# Patient Record
Sex: MALE | Race: WHITE | Employment: UNEMPLOYED | URBAN - METROPOLITAN AREA
[De-identification: names, ages, dates, MRNs, and addresses within clinical notes are randomized per-mention and may not be internally consistent; named-entity substitution may affect disease eponyms.]

---

## 2017-02-03 RX ORDER — PRAVASTATIN SODIUM 40 MG/1
40 TABLET ORAL
Qty: 90 TAB | Refills: 3 | Status: SHIPPED | OUTPATIENT
Start: 2017-02-03 | End: 2018-02-07 | Stop reason: ALTCHOICE

## 2017-02-10 ENCOUNTER — PATIENT OUTREACH (OUTPATIENT)
Dept: FAMILY MEDICINE CLINIC | Age: 82
End: 2017-02-10

## 2017-02-10 ENCOUNTER — OFFICE VISIT (OUTPATIENT)
Dept: FAMILY MEDICINE CLINIC | Age: 82
End: 2017-02-10

## 2017-02-10 VITALS
SYSTOLIC BLOOD PRESSURE: 152 MMHG | WEIGHT: 171 LBS | RESPIRATION RATE: 16 BRPM | TEMPERATURE: 96.2 F | HEART RATE: 61 BPM | DIASTOLIC BLOOD PRESSURE: 46 MMHG | HEIGHT: 66 IN | BODY MASS INDEX: 27.48 KG/M2

## 2017-02-10 DIAGNOSIS — Z78.9 ADVANCE DIRECTIVE DECLINED BY PATIENT: ICD-10-CM

## 2017-02-10 DIAGNOSIS — Z23 NEED FOR TDAP VACCINATION: Primary | ICD-10-CM

## 2017-02-10 DIAGNOSIS — Z13.39 SCREENING FOR ALCOHOLISM: ICD-10-CM

## 2017-02-10 DIAGNOSIS — Z00.00 ROUTINE GENERAL MEDICAL EXAMINATION AT A HEALTH CARE FACILITY: ICD-10-CM

## 2017-02-10 NOTE — PROGRESS NOTES
1. Have you been to the ER, urgent care clinic since your last visit? Hospitalized since your last visit? No.     2. Have you seen or consulted any other health care providers outside of the [address] [place] [address] since your last visit? Include any pap smears or colon screening.  No.    Patient presents with Medicare Wellness Exam.

## 2017-02-10 NOTE — ACP (ADVANCE CARE PLANNING)
Non-Provider Advance Care Planning (ACP) Note    Date of ACP Conversation: 2/10/2017  Persons included in Conversation: patient  Length of ACP Conversation in minutes: <16 minutes (Non-Billable)    Conversation requested by:   Provider      Authorized Decision Maker (if patient is incapable of making informed decisions): This person is:  NA    General ACP for ALL Patients with Decision Making Capacity:    Advance Directive Conversation with Patients who have not yet planned:  Importance of advance care planning, including choosing a healthcare agent to communicate patient's healthcare decisions if patient lost the ability to make decisions, such as after a sudden illness or accident    Review of Existing Advance Directive: (Select questions covered)  NA    Interventions Provided:  Discussed importance of ACP, patient will think about it.

## 2017-02-10 NOTE — PROGRESS NOTES
This is a Subsequent Medicare Annual Wellness Visit providing Personalized Prevention Plan Services (PPPS) (Performed 12 months after initial AWV and PPPS )    I have reviewed the patient's medical history in detail and updated the computerized patient record. History     Past Medical History   Diagnosis Date    AAA (abdominal aortic aneurysm) ([facility] [address]      infrarenal    B12 deficiency 7/2009     261    CAD (coronary artery disease)      Denis    Diverticulosis of colon 2009     sigmoid    Fatty liver 2009    Hearing loss      aides    HTN (hypertension)     Hyperlipidemia     Hypogonadism male     Inguinal hernia      right    Osteoporosis 2006     comp fx T6,7, L1, 2009 fx T8,9, right 10th rib    Renal cyst, right 2009      Past Surgical History   Procedure Laterality Date    Hx hernia repair       left    Hx coronary artery bypass graft       Current Outpatient Prescriptions   Medication Sig Dispense Refill    atorvastatin (LIPITOR) 40 mg tablet Take 1 Tab by mouth daily. 30 Tab 11    NIFEdipine ER (ADALAT CC) 60 mg ER tablet Take 1 Tab by mouth daily. 90 Tab 3    labetalol (NORMODYNE) 100 mg tablet Take 1/2 tablet twice per day. 180 Tab 3    albuterol (PROVENTIL HFA, VENTOLIN HFA, PROAIR HFA) 90 mcg/actuation inhaler Take 1 Puff by inhalation every four (4) hours as needed for Wheezing. 1 Inhaler 1    aspirin delayed-release 81 mg tablet Take 1 Tab by mouth daily. 90 Tab 3    cyanocobalamin (VITAMIN B-12) 1,000 mcg tablet Take 1 Tab by mouth daily. 90 Tab 3    calcium-cholecalciferol, D3, (CALCARB 600 WITH VITAMIN D) tablet Take 1 Tab by mouth two (2) times a day. 180 Tab 3    pravastatin (PRAVACHOL) 40 mg tablet Take 1 Tab by mouth nightly.  90 Tab 3     Allergies   Allergen Reactions    Ace Inhibitors Cough    Codeine Other (comments)     headache    Fosamax [Alendronate] Other (comments)     peridontal pain    Procardia [Nifedipine] Swelling     Family History   Problem Relation Age of Onset    Cancer Mother     Stroke Mother     Heart Disease Father      Social History   Substance Use Topics    Smoking status: Former Smoker     Years: 8.00     Quit date: 11/7/1953    Smokeless tobacco: Never Used    Alcohol use No     Patient Active Problem List   Diagnosis Code    CAD (coronary artery disease) I25.10    Hyperlipidemia E78.5    B12 deficiency E53.8    Renal cyst, right N28.1    Fatty liver K76.0    Inguinal hernia K40.90    Diverticulosis of colon K57.30    Hypogonadism male E29.1    Osteoporosis M81.0    Colon cancer screening - patient declines Z12.11    Aortic insufficiency I35.1    Basal cell carcinoma C44.91    Pulmonary nodule, right R91.1    Essential hypertension I10    Advanced care planning/counseling discussion Z71.89    Abdominal aortic aneurysm without rupture ([facility] [address] I71.4    Emphysema of lung ([facility] [address] J43.9    Pulmonary emphysema ([facility] [address] J43.9       Depression Risk Factor Screening:     PHQ 2 / 9, over the last two weeks 2/10/2017   Little interest or pleasure in doing things Not at all   Feeling down, depressed or hopeless Not at all   Total Score PHQ 2 0     Alcohol Risk Factor Screening: On any occasion during the past 3 months, have you had more than 4 drinks containing alcohol? No    Do you average more than 14 drinks per week? No      Functional Ability and Level of Safety:     Hearing Loss   Mild to both ears, seen by ENT- holding off hearing aids. Activities of Daily Living   Self-care. Requires assistance with: no ADLs    Fall Risk     Fall Risk Assessment, last 12 mths 2/10/2017   Able to walk? Yes   Fall in past 12 months? No     Abuse Screen   Patient is not abused. Lives alone.     Review of Systems   Not required    Physical Examination     Evaluation of Cognitive Function:  Mood/affect:  happy  Appearance: age appropriate  Family member/caregiver input: None      Patient Care Team:  Chico Torres MD as PCP - General (Internal Medicine)  Olivia Turner MD (Dermatology)  Marcine Duane, MD (Otolaryngology)  Marilyn Holt MD (Cardiology)    Advice/Referrals/Counseling   Education and counseling provided:  End-of-Life planning (with patient's consent)  Need for Tdap    Assessment/Plan   reviewed diet, exercise and weight control. Walks short distance, very active at home. Referred to local pharmacy for Tdap vaccine. Not ready to complete ACP, willing to think about it.

## 2017-02-10 NOTE — PROGRESS NOTES
Repeat manual /46, medications taking this am. Patient will FU with Dr. Juan Ramon Centeno in 1 month.

## 2017-02-10 NOTE — PATIENT INSTRUCTIONS
Medicare Wellness Visit, Male    The best way to live healthy is to have a healthy lifestyle by eating a well-balanced diet, exercising regularly, limiting alcohol and stopping smoking. Regular physical exams and screening tests are another way to keep healthy. Preventive exams provided by your health care provider can find health problems before they become diseases or illnesses. Preventive services including immunizations, screening tests, monitoring and exams can help you take care of your own health. All people over age 72 should have a pneumovax  and and a prevnar shot to prevent pneumonia. These are once in a lifetime unless you and your provider decide differently. All people over 65 should have a yearly flu shot and a tetanus vaccine every 10 years. Screening for diabetes mellitus with a blood sugar test should be done every year. Glaucoma is a disease of the eye due to increased ocular pressure that can lead to blindness and it should be done every year by an eye professional.    Cardiovascular screening tests that check for elevated lipids (fatty part of blood) which can lead to heart disease and strokes should be done every 5 years. Colorectal screening that evaluates for blood or polyps in your colon should be done yearly as a stool test or every five years as a flexible sigmoidoscope or every 10 years as a colonoscopy up to age 76. Men up to age 76 may need a screening blood test for prostate cancer at certain intervals, depending on their personal and family history. This decision is between the patient and his provider. If you have been a smoker or had family history of abdominal aortic aneurysms, you and your provider may decide to schedule an ultrasound test of your aorta. Hepatitis C screening is also recommended for anyone born between [birth year] through [address]. A shingles vaccine is also recommended once in a lifetime after age 61.     Your Medicare Wellness Exam is recommended annually. Here is a list of your current Health Maintenance items with a due date:  Health Maintenance Due   Topic Date Due    DTaP/Tdap/Td  (1 - Tdap) 11/14/1951       Medicare Wellness Visit, Male    The best way to live healthy is to have a healthy lifestyle by eating a well-balanced diet, exercising regularly, limiting alcohol and stopping smoking. Regular physical exams and screening tests are another way to keep healthy. Preventive exams provided by your health care provider can find health problems before they become diseases or illnesses. Preventive services including immunizations, screening tests, monitoring and exams can help you take care of your own health. All people over age 72 should have a pneumovax  and and a prevnar shot to prevent pneumonia. These are once in a lifetime unless you and your provider decide differently. All people over 65 should have a yearly flu shot and a tetanus vaccine every 10 years. Screening for diabetes mellitus with a blood sugar test should be done every year. Glaucoma is a disease of the eye due to increased ocular pressure that can lead to blindness and it should be done every year by an eye professional.    Cardiovascular screening tests that check for elevated lipids (fatty part of blood) which can lead to heart disease and strokes should be done every 5 years. Colorectal screening that evaluates for blood or polyps in your colon should be done yearly as a stool test or every five years as a flexible sigmoidoscope or every 10 years as a colonoscopy up to age 76. Men up to age 76 may need a screening blood test for prostate cancer at certain intervals, depending on their personal and family history. This decision is between the patient and his provider. If you have been a smoker or had family history of abdominal aortic aneurysms, you and your provider may decide to schedule an ultrasound test of your aorta.     Hepatitis C screening is also recommended for anyone born between [birth year] [address]. A shingles vaccine is also recommended once in a lifetime after age 61. Your Medicare Wellness Exam is recommended annually. Here is a list of your current Health Maintenance items with a due date:  Health Maintenance Due   Topic Date Due    DTaP/Tdap/Td  (1 - Tdap) 1951          Tdap (Tetanus, Diphtheria, Pertussis) Vaccine: What You Need to Know  Why get vaccinated? Tetanus, diphtheria, and pertussis are very serious diseases. Tdap vaccine can protect us from these diseases. And Tdap vaccine given to pregnant women can protect  babies against pertussis. Tetanus (lockjaw) is rare in the [facility] today. It causes painful muscle tightening and stiffness, usually all over the body. · It can lead to tightening of muscles in the head and neck so you can't open your mouth, swallow, or sometimes even breathe. Tetanus kills about 1 out of 10 people who are infected even after receiving the best medical care. Diphtheria is also rare in the [place] today. It can cause a thick coating to form in the back of the throat. · It can lead to breathing problems, heart failure, paralysis, and death. Pertussis (whooping cough) causes severe coughing spells, which can cause difficulty breathing, vomiting, and disturbed sleep. · It can also lead to weight loss, incontinence, and rib fractures. Up to 2 in 100 adolescents and 5 in 100 adults with pertussis are hospitalized or have complications, which could include pneumonia or death. These diseases are caused by bacteria. Diphtheria and pertussis are spread from person to person through secretions from coughing or sneezing. Tetanus enters the body through cuts, scratches, or wounds. Before vaccines, as many as 200,000 cases of diphtheria, 200,000 cases of pertussis, and hundreds of cases of tetanus were reported in the [place] each year.  Since vaccination began, reports of cases for tetanus and diphtheria have dropped by about 99% and for pertussis by about 80%. Tdap vaccine  The Tdap vaccine can protect adolescents and adults from tetanus, diphtheria, and pertussis. One dose of Tdap is routinely given at age 6 or 15. People who did not get Tdap at that age should get it as soon as possible. Tdap is especially important for health care professionals and anyone having close contact with a baby younger than 12 months. Pregnant women should get a dose of Tdap during every pregnancy, to protect the  from pertussis. Infants are most at risk for severe, life-threatening complications from pertussis. Another vaccine, called Td, protects against tetanus and diphtheria, but not pertussis. A Td booster should be given every 10 years. Tdap may be given as one of these boosters if you have never gotten Tdap before. Tdap may also be given after a severe cut or burn to prevent tetanus infection. Your doctor or the person giving you the vaccine can give you more information. Tdap may safely be given at the same time as other vaccines. Some people should not get this vaccine  · A person who has ever had a life-threatening allergic reaction after a previous dose of any diphtheria-, tetanus-, or pertussis-containing vaccine, OR has a severe allergy to any part of this vaccine, should not get Tdap vaccine. Tell the person giving the vaccine about any severe allergies. · Anyone who had coma or long repeated seizures within 7 days after a childhood dose of DTP or DTaP, or a previous dose of Tdap, should not get Tdap, unless a cause other than the vaccine was found. They can still get Td. · Talk to your doctor if you:  ¨ Have seizures or another nervous system problem. ¨ Had severe pain or swelling after any vaccine containing diphtheria, tetanus, or pertussis. ¨ Ever had a condition called Guillain-Barré Syndrome (GBS).   ¨ Aren't feeling well on the day the shot is scheduled. Risks  With any medicine, including vaccines, there is a chance of side effects. These are usually mild and go away on their own. Serious reactions are also possible but are rare. Most people who get Tdap vaccine do not have any problems with it. Mild problems following Tdap  (Did not interfere with activities)  · Pain where the shot was given (about 3 in 4 adolescents or 2 in 3 adults)  · Redness or swelling where the shot was given (about 1 person in 5)  · Mild fever of at least 100.4°F (up to about 1 in 25 adolescents or 1 in 100 adults)  · Headache (about 3 or 4 people in 10)  · Tiredness (about 1 person in 3 or 4)  · Nausea, vomiting, diarrhea, stomachache (up to 1 in 4 adolescents or 1 in 10 adults)  · Chills, sore joints (about 1 person in 10)  · Body aches (about 1 person in 3 or 4)  · Rash, swollen glands (uncommon)  Moderate problems following Tdap  (Interfered with activities, but did not require medical attention)  · Pain where the shot was given (up to 1 in 5 or 6)  · Redness or swelling where the shot was given (up to about 1 in 16 adolescents or 1 in 12 adults)  · Fever over 102°F (about 1 in 100 adolescents or 1 in 250 adults)  · Headache (about 1 in 7 adolescents or 1 in 10 adults)  · Nausea, vomiting, diarrhea, stomachache (up to 1 to 3 people in 100)  · Swelling of the entire arm where the shot was given (up to about 1 in 500)  Severe problems following Tdap  (Unable to perform usual activities; required medical attention)  · Swelling, severe pain, bleeding and redness in the arm where the shot was given (rare)  Problems that could happen after any vaccine:  · People sometimes faint after a medical procedure, including vaccination. Sitting or lying down for about 15 minutes can help prevent fainting, and injuries caused by a fall. Tell your doctor if you feel dizzy or have vision changes or ringing in the ears.   · Some people get severe pain in the shoulder and have difficulty moving the arm where a shot was given. This happens very rarely. · Any medication can cause a severe allergic reaction. Such reactions from a vaccine are very rare, estimated at fewer than 1 in a million doses, and would happen within a few minutes to a few hours after the vaccination. As with any medicine, there is a very remote chance of a vaccine causing a serious injury or death. The safety of vaccines is always being monitored. For more information, visit: www.cdc.gov/vaccinesafety. What if there is a serious problem? What should I look for? · Look for anything that concerns you, such as signs of a severe allergic reaction, very high fever, or unusual behavior. Signs of a severe allergic reaction can include hives, swelling of the face and throat, difficulty breathing, a fast heartbeat, dizziness, and weakness. These would usually start a few minutes to a few hours after the vaccination. What should I do? · If you think it is a severe allergic reaction or other emergency that can't wait, call 9-1-1 or get the person to the nearest hospital. Otherwise, call your doctor. · Afterward, the reaction should be reported to the Vaccine Adverse Event Reporting System (VAERS). Your doctor might file this report, or you can do it yourself through the VAERS web site at www.vaers. hhs.gov, or by calling 7-795.858.3639. VAERS does not give medical advice. The National Vaccine Injury Compensation Program  The National Vaccine Injury Compensation Program (VICP) is a federal program that was created to compensate people who may have been injured by certain vaccines. Persons who believe they may have been injured by a vaccine can learn about the program and about filing a claim by calling 4-439.465.8328 or visiting the [employer] website at www.[facility].gov/vaccinecompensation. There is a time limit to file a claim for compensation. How can I learn more? · Ask your doctor.  He or she can give you the vaccine package insert or suggest other sources of information. · Call your local or state health department. · Contact the Centers for Disease Control and Prevention (CDC):  ¨ Call 8-446.151.1225 (1-800-CDC-INFO) or  ¨ Visit CDC's website at www.cdc.gov/vaccines  Vaccine Information Statement (Interim)  Tdap Vaccine  (2/24/15)  42 ANAND Sanders [id number]-[id number]  Department of Health and Human Services  Centers for Disease Control and Prevention  Many Vaccine Information Statements are available in Indian and other languages. See www.immunize.org/vis. Muchas hojas de información sobre vacunas están disponibles en español y en otros idiomas. Visite www.immunize.org/vis. Care instructions adapted under license by your healthcare professional. If you have questions about a medical condition or this instruction, always ask your healthcare professional. [address] any warranty or liability for your use of this information.

## 2017-02-10 NOTE — MR AVS SNAPSHOT
Visit Information Date & Time Provider Department Dept. Phone Encounter #  
 2/10/2017  9:45 AM Damaso Douglas, [facility] [phone] [id number] Follow-up Instructions Return in about 1 month (around 3/10/2017) for 30 MINUTE fu. Upcoming Health Maintenance Date Due DTaP/Tdap/Td series (1 - Tdap) 11/14/1951 GLAUCOMA SCREENING Q2Y 4/15/2017 MEDICARE YEARLY EXAM 2/11/2018 Allergies as of 2/10/2017  Review Complete On: 2/10/2017 By: Jama Armendariz Severity Noted Reaction Type Reactions Ace Inhibitors  11/29/2010    Cough Codeine  11/19/2010    Other (comments)  
 headache Fosamax [Alendronate]  11/19/2010    Other (comments) peridontal pain Procardia [Nifedipine]  11/19/2010    Swelling Current Immunizations  Reviewed on 9/9/2016 Name Date Influenza High Dose Vaccine PF 11/15/2016 Influenza Vaccine 11/5/2014, 10/8/2013 Influenza Vaccine (Quad) 9/8/2015 12:00 PM  
 Pneumococcal Conjugate (PCV-13) 11/15/2016 Pneumococcal Vaccine (Unspecified Type) 11/7/2011 Not reviewed this visit You Were Diagnosed With   
  
 Codes Comments Need for Tdap vaccination    -  Primary ICD-10-CM: O34 ICD-9-CM: V06.1 Routine general medical examination at a health care facility     ICD-10-CM: Z00.00 ICD-9-CM: V70.0 Screening for alcoholism     ICD-10-CM: Z13.89 ICD-9-CM: V79.1 Advance directive declined by patient     ICD-10-CM: Z78.9 ICD-9-CM: V49.89 Vitals BP Pulse Temp Resp Height(growth percentile) Weight(growth percentile) 152/46 (BP 1 Location: Left arm, BP Patient Position: Sitting) 61 96.2 °F (35.7 °C) (Oral) 16 5' 6\" (1.676 m) 171 lb (77.6 kg) BMI Smoking Status 27.6 kg/m2 Former Smoker Vitals History BMI and BSA Data Body Mass Index Body Surface Area  
 27.6 kg/m 2 1.9 m 2 Preferred Pharmacy Pharmacy Name Phone [facility], [address] [place] [address] [phone] Your Updated Medication List  
  
   
This list is accurate as of: 2/10/17 10:13 AM.  Always use your most recent med list.  
  
  
  
  
 albuterol 90 mcg/actuation inhaler Commonly known as:  PROVENTIL HFA, VENTOLIN HFA, PROAIR HFA Take 1 Puff by inhalation every four (4) hours as needed for Wheezing. aspirin delayed-release 81 mg tablet Take 1 Tab by mouth daily. atorvastatin 40 mg tablet Commonly known as:  LIPITOR Take 1 Tab by mouth daily. calcium-cholecalciferol (D3) tablet Commonly known as:  CALCARB 600 WITH VITAMIN D Take 1 Tab by mouth two (2) times a day. cyanocobalamin 1,000 mcg tablet Commonly known as:  VITAMIN B-12 Take 1 Tab by mouth daily. labetalol 100 mg tablet Commonly known as:  Seneca Iván Take 1/2 tablet twice per day. NIFEdipine ER 60 mg ER tablet Commonly known as:  ADALAT CC Take 1 Tab by mouth daily. pravastatin 40 mg tablet Commonly known as:  PRAVACHOL Take 1 Tab by mouth nightly. Follow-up Instructions Return in about 1 month (around 3/10/2017) for 30 MINUTE fu.  
  
  
Patient Instructions Medicare Wellness Visit, Male The best way to live healthy is to have a healthy lifestyle by eating a well-balanced diet, exercising regularly, limiting alcohol and stopping smoking. Regular physical exams and screening tests are another way to keep healthy. Preventive exams provided by your health care provider can find health problems before they become diseases or illnesses. Preventive services including immunizations, screening tests, monitoring and exams can help you take care of your own health. All people over age 72 should have a pneumovax  and and a prevnar shot to prevent pneumonia. These are once in a lifetime unless you and your provider decide differently. All people over 65 should have a yearly flu shot and a tetanus vaccine every 10 years. Screening for diabetes mellitus with a blood sugar test should be done every year. Glaucoma is a disease of the eye due to increased ocular pressure that can lead to blindness and it should be done every year by an eye professional. 
 
Cardiovascular screening tests that check for elevated lipids (fatty part of blood) which can lead to heart disease and strokes should be done every 5 years. Colorectal screening that evaluates for blood or polyps in your colon should be done yearly as a stool test or every five years as a flexible sigmoidoscope or every 10 years as a colonoscopy up to age 76. Men up to age 76 may need a screening blood test for prostate cancer at certain intervals, depending on their personal and family history. This decision is between the patient and his provider. If you have been a smoker or had family history of abdominal aortic aneurysms, you and your provider may decide to schedule an ultrasound test of your aorta. Hepatitis C screening is also recommended for anyone born between [birth year] through [address]. A shingles vaccine is also recommended once in a lifetime after age 61. Your Medicare Wellness Exam is recommended annually. Here is a list of your current Health Maintenance items with a due date: 
Health Maintenance Due Topic Date Due  
 DTaP/Tdap/Td  (1 - Tdap) 11/14/1951 Medicare Wellness Visit, Male The best way to live healthy is to have a healthy lifestyle by eating a well-balanced diet, exercising regularly, limiting alcohol and stopping smoking. Regular physical exams and screening tests are another way to keep healthy. Preventive exams provided by your health care provider can find health problems before they become diseases or illnesses.  Preventive services including immunizations, screening tests, monitoring and exams can help you take care of your own health. All people over age 72 should have a pneumovax  and and a prevnar shot to prevent pneumonia. These are once in a lifetime unless you and your provider decide differently. All people over 65 should have a yearly flu shot and a tetanus vaccine every 10 years. Screening for diabetes mellitus with a blood sugar test should be done every year. Glaucoma is a disease of the eye due to increased ocular pressure that can lead to blindness and it should be done every year by an eye professional. 
 
Cardiovascular screening tests that check for elevated lipids (fatty part of blood) which can lead to heart disease and strokes should be done every 5 years. Colorectal screening that evaluates for blood or polyps in your colon should be done yearly as a stool test or every five years as a flexible sigmoidoscope or every 10 years as a colonoscopy up to age 76. Men up to age 76 may need a screening blood test for prostate cancer at certain intervals, depending on their personal and family history. This decision is between the patient and his provider. If you have been a smoker or had family history of abdominal aortic aneurysms, you and your provider may decide to schedule an ultrasound test of your aorta. Hepatitis C screening is also recommended for anyone born between [birth year] [address]. A shingles vaccine is also recommended once in a lifetime after age 61. Your Medicare Wellness Exam is recommended annually. Here is a list of your current Health Maintenance items with a due date: 
Health Maintenance Due Topic Date Due  
 DTaP/Tdap/Td  (1 - Tdap) 1951 Tdap (Tetanus, Diphtheria, Pertussis) Vaccine: What You Need to Know Why get vaccinated? Tetanus, diphtheria, and pertussis are very serious diseases. Tdap vaccine can protect us from these diseases. And Tdap vaccine given to pregnant women can protect  babies against pertussis. Tetanus (lockjaw) is rare in the [facility] today. It causes painful muscle tightening and stiffness, usually all over the body. · It can lead to tightening of muscles in the head and neck so you can't open your mouth, swallow, or sometimes even breathe. Tetanus kills about 1 out of 10 people who are infected even after receiving the best medical care. Diphtheria is also rare in the [place] today. It can cause a thick coating to form in the back of the throat. · It can lead to breathing problems, heart failure, paralysis, and death. Pertussis (whooping cough) causes severe coughing spells, which can cause difficulty breathing, vomiting, and disturbed sleep. · It can also lead to weight loss, incontinence, and rib fractures. Up to 2 in 100 adolescents and 5 in 100 adults with pertussis are hospitalized or have complications, which could include pneumonia or death. These diseases are caused by bacteria. Diphtheria and pertussis are spread from person to person through secretions from coughing or sneezing. Tetanus enters the body through cuts, scratches, or wounds. Before vaccines, as many as 200,000 cases of diphtheria, 200,000 cases of pertussis, and hundreds of cases of tetanus were reported in the [place] each year. Since vaccination began, reports of cases for tetanus and diphtheria have dropped by about 99% and for pertussis by about 80%. Tdap vaccine The Tdap vaccine can protect adolescents and adults from tetanus, diphtheria, and pertussis. One dose of Tdap is routinely given at age 6 or 15. People who did not get Tdap at that age should get it as soon as possible. Tdap is especially important for health care professionals and anyone having close contact with a baby younger than 12 months. Pregnant women should get a dose of Tdap during every pregnancy, to protect the  from pertussis. Infants are most at risk for severe, life-threatening complications from pertussis. Another vaccine, called Td, protects against tetanus and diphtheria, but not pertussis. A Td booster should be given every 10 years. Tdap may be given as one of these boosters if you have never gotten Tdap before. Tdap may also be given after a severe cut or burn to prevent tetanus infection. Your doctor or the person giving you the vaccine can give you more information. Tdap may safely be given at the same time as other vaccines. Some people should not get this vaccine · A person who has ever had a life-threatening allergic reaction after a previous dose of any diphtheria-, tetanus-, or pertussis-containing vaccine, OR has a severe allergy to any part of this vaccine, should not get Tdap vaccine. Tell the person giving the vaccine about any severe allergies. · Anyone who had coma or long repeated seizures within 7 days after a childhood dose of DTP or DTaP, or a previous dose of Tdap, should not get Tdap, unless a cause other than the vaccine was found. They can still get Td. · Talk to your doctor if you: 
¨ Have seizures or another nervous system problem. ¨ Had severe pain or swelling after any vaccine containing diphtheria, tetanus, or pertussis. ¨ Ever had a condition called Guillain-Barré Syndrome (GBS). ¨ Aren't feeling well on the day the shot is scheduled. Risks With any medicine, including vaccines, there is a chance of side effects. These are usually mild and go away on their own. Serious reactions are also possible but are rare. Most people who get Tdap vaccine do not have any problems with it. Mild problems following Tdap 
(Did not interfere with activities) · Pain where the shot was given (about 3 in 4 adolescents or 2 in 3 adults) · Redness or swelling where the shot was given (about 1 person in 5) · Mild fever of at least 100.4°F (up to about 1 in 25 adolescents or 1 in 100 adults) · Headache (about 3 or 4 people in 10) · Tiredness (about 1 person in 3 or 4) · Nausea, vomiting, diarrhea, stomachache (up to 1 in 4 adolescents or 1 in 10 adults) · Chills, sore joints (about 1 person in 10) · Body aches (about 1 person in 3 or 4) · Rash, swollen glands (uncommon) Moderate problems following Tdap (Interfered with activities, but did not require medical attention) · Pain where the shot was given (up to 1 in 5 or 6) · Redness or swelling where the shot was given (up to about 1 in 16 adolescents or 1 in 12 adults) · Fever over 102°F (about 1 in 100 adolescents or 1 in 250 adults) · Headache (about 1 in 7 adolescents or 1 in 10 adults) · Nausea, vomiting, diarrhea, stomachache (up to 1 to 3 people in 100) · Swelling of the entire arm where the shot was given (up to about 1 in 500) Severe problems following Tdap 
(Unable to perform usual activities; required medical attention) · Swelling, severe pain, bleeding and redness in the arm where the shot was given (rare) Problems that could happen after any vaccine: · People sometimes faint after a medical procedure, including vaccination. Sitting or lying down for about 15 minutes can help prevent fainting, and injuries caused by a fall. Tell your doctor if you feel dizzy or have vision changes or ringing in the ears. · Some people get severe pain in the shoulder and have difficulty moving the arm where a shot was given. This happens very rarely. · Any medication can cause a severe allergic reaction. Such reactions from a vaccine are very rare, estimated at fewer than 1 in a million doses, and would happen within a few minutes to a few hours after the vaccination. As with any medicine, there is a very remote chance of a vaccine causing a serious injury or death. The safety of vaccines is always being monitored. For more information, visit: www.cdc.gov/vaccinesafety. What if there is a serious problem? What should I look for?  
· Look for anything that concerns you, such as signs of a severe allergic reaction, very high fever, or unusual behavior. Signs of a severe allergic reaction can include hives, swelling of the face and throat, difficulty breathing, a fast heartbeat, dizziness, and weakness. These would usually start a few minutes to a few hours after the vaccination. What should I do? · If you think it is a severe allergic reaction or other emergency that can't wait, call 9-1-1 or get the person to the nearest hospital. Otherwise, call your doctor. · Afterward, the reaction should be reported to the Vaccine Adverse Event Reporting System (VAERS). Your doctor might file this report, or you can do it yourself through the VAERS web site at www.vaers. [facility].gov, or by calling 7-309.791.1550. VAERS does not give medical advice. The National Vaccine Injury Compensation Program 
The National Vaccine Injury Compensation Program (VICP) is a federal program that was created to compensate people who may have been injured by certain vaccines. Persons who believe they may have been injured by a vaccine can learn about the program and about filing a claim by calling 1-246.713.8112 or visiting the [employer] website at www.[facility].gov/vaccinecompensation. There is a time limit to file a claim for compensation. How can I learn more? · Ask your doctor. He or she can give you the vaccine package insert or suggest other sources of information. · Call your local or state health department. · Contact the Centers for Disease Control and Prevention (CDC): 
¨ Call 9-702.910.1529 (1-800-CDC-INFO) or ¨ Visit CDC's website at www.cdc.gov/vaccines Vaccine Information Statement (Interim) Tdap Vaccine 
(2/24/15) [address] Karen Oppenheim 959OG-32 Department of [facility] and [employer] Centers for Disease Control and Prevention Many Vaccine Information Statements are available in [place] and other languages. See www.immunize.org/vis.  
Muchas hojas de información sobre vacunas están disponibles en español y en otros idiomas. Visite www.immunize.org/vis. Care instructions adapted under license by your healthcare professional. If you have questions about a medical condition or this instruction, always ask your healthcare professional. [address] any warranty or liability for your use of this information. Introducing [facility]! Kodak Douglas introduces [employer] patient portal. Now you can access parts of your medical record, email your doctor's office, and request medication refills online. 1. In your internet browser, go to [email or web address] 2. Click on the First Time User? Click Here link in the Sign In box. You will see the New Member Sign Up page. 3. Enter your [employer] Access Code exactly as it appears below. You will not need to use this code after youve completed the sign-up process. If you do not sign up before the expiration date, you must request a new code. · [employer] Access Code: [email or web address] Expires: 3/12/2017  2:17 PM 
 
4. Enter the last four digits of your Social Security Number (xxxx) and Date of Birth (mm/dd/yyyy) as indicated and click Submit. You will be taken to the next sign-up page. 5. Create a [employer] ID. This will be your [employer] login ID and cannot be changed, so think of one that is secure and easy to remember. 6. Create a [employer] password. You can change your password at any time. 7. Enter your Password Reset Question and Answer. This can be used at a later time if you forget your password. 8. Enter your e-mail address. You will receive e-mail notification when new information is available in [address] 9. Click Sign Up. You can now view and download portions of your medical record. 10. Click the Download Summary menu link to download a portable copy of your medical information.  
 
If you have questions, please visit the Frequently Asked Questions section of the [employer]. Remember, [employer] is NOT to be used for urgent needs. For medical emergencies, dial 911. Now available from your iPhone and Android! Please provide this summary of care documentation to your next provider. Your primary care clinician is listed as PAUL Medina. If you have any questions after today's visit, please call [phone].

## 2017-03-16 ENCOUNTER — OFFICE VISIT (OUTPATIENT)
Dept: FAMILY MEDICINE CLINIC | Age: 82
End: 2017-03-16

## 2017-03-16 VITALS
SYSTOLIC BLOOD PRESSURE: 140 MMHG | BODY MASS INDEX: 27.64 KG/M2 | TEMPERATURE: 95.8 F | RESPIRATION RATE: 16 BRPM | WEIGHT: 172 LBS | DIASTOLIC BLOOD PRESSURE: 48 MMHG | HEART RATE: 64 BPM | HEIGHT: 66 IN

## 2017-03-16 DIAGNOSIS — F32.A DEPRESSION, UNSPECIFIED DEPRESSION TYPE: Primary | ICD-10-CM

## 2017-03-16 DIAGNOSIS — I71.40 ABDOMINAL AORTIC ANEURYSM WITHOUT RUPTURE: ICD-10-CM

## 2017-03-16 RX ORDER — VENLAFAXINE HYDROCHLORIDE 37.5 MG/1
37.5 CAPSULE, EXTENDED RELEASE ORAL DAILY
Qty: 30 CAP | Refills: 5 | Status: SHIPPED | OUTPATIENT
Start: 2017-03-16 | End: 2017-06-14 | Stop reason: SINTOL

## 2017-03-16 RX ORDER — VENLAFAXINE HYDROCHLORIDE 37.5 MG/1
37.5 CAPSULE, EXTENDED RELEASE ORAL DAILY
Qty: 30 CAP | Refills: 5 | Status: SHIPPED | OUTPATIENT
Start: 2017-03-16 | End: 2017-03-16 | Stop reason: SDUPTHER

## 2017-03-16 NOTE — PROGRESS NOTES
1. Have you been to the ER, urgent care clinic since your last visit? Hospitalized since your last visit? No.     2. Have you seen or consulted any other health care providers outside of the [address] [place] [address] since your last visit? Include any pap smears or colon screening. No.    Patient presents with follow up CAD, Hypertension, hyperlipidemia and osteoporosis.

## 2017-03-16 NOTE — PATIENT INSTRUCTIONS
Please make an appointment with the eye doctor next month for a glaucoma screening. Recovering From Depression: Care Instructions  Your Care Instructions  Taking good care of yourself is important as you recover from depression. In time, your symptoms will fade as your treatment takes hold. Do not give up. Instead, focus your energy on getting better. Your mood will improve. It just takes some time. Focus on things that can help you feel better, such as being with friends and family, eating well, and getting enough rest. But take things slowly. Do not do too much too soon. You will begin to feel better gradually. Follow-up care is a key part of your treatment and safety. Be sure to make and go to all appointments, and call your doctor if you are having problems. It's also a good idea to know your test results and keep a list of the medicines you take. How can you care for yourself at home? Be realistic  · If you have a large task to do, break it up into smaller steps you can handle, and just do what you can. · You may want to put off important decisions until your depression has lifted. If you have plans that will have a major impact on your life, such as marriage, divorce, or a job change, try to wait a bit. Talk it over with friends and loved ones who can help you look at the overall picture first.  · Reaching out to people for help is important. Do not isolate yourself. Let your family and friends help you. Find someone you can trust and confide in, and talk to that person. · Be patient, and be kind to yourself. Remember that depression is not your fault and is not something you can overcome with willpower alone. Treatment is necessary for depression, just like for any other illness. Feeling better takes time, and your mood will improve little by little. Stay active  · Stay busy and get outside. Take a walk, or try some other light exercise.   · Talk with your doctor about an exercise program. Exercise can help with mild depression. · Go to a movie or concert. Take part in a Jewish activity or other social gathering. Go to a ball game. · Ask a friend to have dinner with you. Take care of yourself  · Eat a balanced diet with plenty of fresh fruits and vegetables, whole grains, and lean protein. If you have lost your appetite, eat small snacks rather than large meals. · Avoid drinking alcohol or using illegal drugs. Do not take medicines that have not been prescribed for you. They may interfere with medicines you may be taking for depression, or they may make your depression worse. · Take your medicines exactly as they are prescribed. You may start to feel better within 1 to 3 weeks of taking antidepressant medicine. But it can take as many as 6 to 8 weeks to see more improvement. If you have questions or concerns about your medicines, or if you do not notice any improvement by 3 weeks, talk to your doctor. · If you have any side effects from your medicine, tell your doctor. Antidepressants can make you feel tired, dizzy, or nervous. Some people have dry mouth, constipation, headaches, sexual problems, or diarrhea. Many of these side effects are mild and will go away on their own after you have been taking the medicine for a few weeks. Some may last longer. Talk to your doctor if side effects are bothering you too much. You might be able to try a different medicine. · Get enough sleep. If you have problems sleeping:  ¨ Go to bed at the same time every night, and get up at the same time every morning. ¨ Keep your bedroom dark and quiet. ¨ Do not exercise after 5:00 p.m. ¨ Avoid drinks with caffeine after 5:00 p.m. · Avoid sleeping pills unless they are prescribed by the doctor treating your depression. Sleeping pills may make you groggy during the day, and they may interact with other medicine you are taking.   · If you have any other illnesses, such as diabetes, heart disease, or high blood pressure, make sure to continue with your treatment. Tell your doctor about all of the medicines you take, including those with or without a prescription. · Keep the numbers for these national suicide hotlines: 2-875-526-TALK (0-617-577-059-087-9127) and 6-941-UVCVPVJ (2-418.600.7683). If you or someone you know talks about suicide or feeling hopeless, get help right away. When should you call for help? Call 911 anytime you think you may need emergency care. For example, call if:  · You feel like hurting yourself or someone else. · Someone you know has depression and is about to attempt or is attempting suicide. Call your doctor now or seek immediate medical care if:  · You hear voices. · Someone you know has depression and:  ¨ Starts to give away his or her possessions. ¨ Uses illegal drugs or drinks alcohol heavily. ¨ Talks or writes about death, including writing suicide notes or talking about guns, knives, or pills. ¨ Starts to spend a lot of time alone. ¨ Acts very aggressively or suddenly appears calm. Watch closely for changes in your health, and be sure to contact your doctor if:  · You do not get better as expected. Where can you learn more? Go to http://gwendolyn-gay.info/. Enter A229 in the search box to learn more about \"Recovering From Depression: Care Instructions. \"  Current as of: July 26, 2016  Content Version: 11.1  © 4190-2796 [employer]. Care instructions adapted under license by [employer] (which disclaims liability or warranty for this information). If you have questions about a medical condition or this instruction, always ask your healthcare professional. [place] [address] any warranty or liability for your use of this information.

## 2017-03-16 NOTE — PROGRESS NOTES
Sandra Kennedy is a 80 y.o. male and presents with Follow Up Chronic Condition; Coronary Artery Disease; Hypertension; and Osteoporosis       Subjective:    CAD- seeing cardiology-   Aortic aneurysm- repeat CTA done 1/2017 and this remains at 4.5 cm. Pt feels depressed- son only comes up once in a while. No hobbies. Feels hopeless at times. Sometimes feels he would be better off dead. No SI/HI or active plans. htn- taking meds. Assessment/Plan:    CAD/ischemic cardiomyopathy- keep cardiology f/u. Stable. Aortic aneurysm- stable. No changes. htn-sl hypotensive diastolic today but given systolic borderline elevation will hold on changes. Depression- discussed medication and counseling. -pt reluctant but agrees to try these for now. Pt contracts for safety and will call for any SI.     RTC in one month  Orders Placed This Encounter    REFERRAL TO PSYCHOLOGY     Referral Priority:   Routine     Referral Type:   Behavioral Health     Referral Reason:   Specialty Services Required    DISCONTD: venlafaxine-SR (EFFEXOR-XR) 37.5 mg capsule     Sig: Take 1 Cap by mouth daily. Dispense:  30 Cap     Refill:  5    venlafaxine-SR (EFFEXOR-XR) 37.5 mg capsule     Sig: Take 1 Cap by mouth daily. Dispense:  30 Cap     Refill:  5           ROS:  Negative except as mentioned above  Cardiac- no chest pain or palpitations  Pulmonary- no sob or wheezes  GI- no n/v or diarrhea. SH:  Social History   Substance Use Topics    Smoking status: Former Smoker     Years: 8.00     Quit date: 11/7/1953    Smokeless tobacco: Never Used    Alcohol use No         Medications/Allergies:  Current Outpatient Prescriptions on File Prior to Visit   Medication Sig Dispense Refill    pravastatin (PRAVACHOL) 40 mg tablet Take 1 Tab by mouth nightly. 90 Tab 3    atorvastatin (LIPITOR) 40 mg tablet Take 1 Tab by mouth daily. 30 Tab 11    NIFEdipine ER (ADALAT CC) 60 mg ER tablet Take 1 Tab by mouth daily.  90 Tab 3    labetalol (NORMODYNE) 100 mg tablet Take 1/2 tablet twice per day. 180 Tab 3    albuterol (PROVENTIL HFA, VENTOLIN HFA, PROAIR HFA) 90 mcg/actuation inhaler Take 1 Puff by inhalation every four (4) hours as needed for Wheezing. 1 Inhaler 1    aspirin delayed-release 81 mg tablet Take 1 Tab by mouth daily. 90 Tab 3    cyanocobalamin (VITAMIN B-12) 1,000 mcg tablet Take 1 Tab by mouth daily. 90 Tab 3    calcium-cholecalciferol, D3, (CALCARB 600 WITH VITAMIN D) tablet Take 1 Tab by mouth two (2) times a day. 180 Tab 3     No current facility-administered medications on file prior to visit. Allergies   Allergen Reactions    Ace Inhibitors Cough    Codeine Other (comments)     headache    Fosamax [Alendronate] Other (comments)     peridontal pain    Procardia [Nifedipine] Swelling       Objective:  Visit Vitals    /48    Pulse 64    Temp 95.8 °F (35.4 °C) (Oral)    Resp 16    Ht 5' 6\" (1.676 m)    Wt 172 lb (78 kg)    BMI 27.76 kg/m2    Body mass index is 27.76 kg/(m^2). Constitutional: Well developed, nourished, no distress, alert   CV: S1, S2.  RRR. No murmurs/rubs. No thrills palpated. No carotid bruits. Intact distal pulses. 1+ edema. Pulm: No abnormalities on inspection. Clear to auscultation bilaterally. No wheezing/rhonchi. Normal effort. GI: Soft, nontender, nondistended. Normal active bowel sounds. No  masses on palpation. No hepatosplenomegaly.

## 2017-03-16 NOTE — MR AVS SNAPSHOT
Visit Information Date & Time Provider Department Dept. Phone Encounter #  
 3/16/2017 10:15 AM Kasey Spivey, [address] [place] [address] [phone] [id number] Follow-up Instructions Return in about 4 weeks (around 4/13/2017) for 30 minute slot. Upcoming Health Maintenance Date Due  
 GLAUCOMA SCREENING Q2Y 4/15/2017 DTaP/Tdap/Td series (1 - Tdap) 6/7/2017* MEDICARE YEARLY EXAM 2/11/2018 *Topic was postponed. The date shown is not the original due date. Allergies as of 3/16/2017  Review Complete On: 3/16/2017 By: Kasey Spivey MD  
  
 Severity Noted Reaction Type Reactions Ace Inhibitors  11/29/2010    Cough Codeine  11/19/2010    Other (comments)  
 headache Fosamax [Alendronate]  11/19/2010    Other (comments) peridontal pain Procardia [Nifedipine]  11/19/2010    Swelling Current Immunizations  Reviewed on 9/9/2016 Name Date Influenza High Dose Vaccine PF 11/15/2016 Influenza Vaccine 11/5/2014, 10/8/2013 Influenza Vaccine (Quad) 9/8/2015 12:00 PM  
 Pneumococcal Conjugate (PCV-13) 11/15/2016 Pneumococcal Vaccine (Unspecified Type) 11/7/2011 Not reviewed this visit You Were Diagnosed With   
  
 Codes Comments Depression, unspecified depression type    -  Primary ICD-10-CM: F32.9 ICD-9-CM: 826 Abdominal aortic aneurysm without rupture (HCC)     ICD-10-CM: I71.4 ICD-9-CM: 141. 4 Vitals BP Pulse Temp Resp Height(growth percentile) Weight(growth percentile) 140/48 64 95.8 °F (35.4 °C) (Oral) 16 5' 6\" (1.676 m) 172 lb (78 kg) BMI Smoking Status 27.76 kg/m2 Former Smoker Vitals History BMI and BSA Data Body Mass Index Body Surface Area  
 27.76 kg/m 2 1.91 m 2 Preferred Pharmacy Pharmacy Name Phone [facility] [phone], [address] [place] [address] [phone] Your Updated Medication List  
  
   
 This list is accurate as of: 3/16/17 10:48 AM.  Always use your most recent med list.  
  
  
  
  
 albuterol 90 mcg/actuation inhaler Commonly known as:  PROVENTIL HFA, VENTOLIN HFA, PROAIR HFA Take 1 Puff by inhalation every four (4) hours as needed for Wheezing. aspirin delayed-release 81 mg tablet Take 1 Tab by mouth daily. atorvastatin 40 mg tablet Commonly known as:  LIPITOR Take 1 Tab by mouth daily. calcium-cholecalciferol (D3) tablet Commonly known as:  CALCARB 600 WITH VITAMIN D Take 1 Tab by mouth two (2) times a day. cyanocobalamin 1,000 mcg tablet Commonly known as:  VITAMIN B-12 Take 1 Tab by mouth daily. labetalol 100 mg tablet Commonly known as:  Bruce Barges Take 1/2 tablet twice per day. NIFEdipine ER 60 mg ER tablet Commonly known as:  ADALAT CC Take 1 Tab by mouth daily. pravastatin 40 mg tablet Commonly known as:  PRAVACHOL Take 1 Tab by mouth nightly. venlafaxine-SR 37.5 mg capsule Commonly known as:  EFFEXOR-XR Take 1 Cap by mouth daily. Prescriptions Sent to Pharmacy Refills  
 venlafaxine-SR (EFFEXOR-XR) 37.5 mg capsule 5 Sig: Take 1 Cap by mouth daily. Class: Normal  
 Pharmacy: [facility] [address], [address] [place] [address] #: [phone] Route: Oral  
  
We Performed the Following REFERRAL TO PSYCHOLOGY [GOI06 Custom] Comments:  
 Please evaluate patient for depression. Follow-up Instructions Return in about 4 weeks (around 4/13/2017) for 30 minute slot. Referral Information Referral ID Referred By Referred To  
  
 [id number] MELIDA Faria Not Available Visits Status Start Date End Date 1 New Request 3/16/17 3/16/18 If your referral has a status of pending review or denied, additional information will be sent to support the outcome of this decision. Patient Instructions Please make an appointment with the eye doctor next month for a glaucoma screening. Recovering From Depression: Care Instructions Your Care Instructions Taking good care of yourself is important as you recover from depression. In time, your symptoms will fade as your treatment takes hold. Do not give up. Instead, focus your energy on getting better. Your mood will improve. It just takes some time. Focus on things that can help you feel better, such as being with friends and family, eating well, and getting enough rest. But take things slowly. Do not do too much too soon. You will begin to feel better gradually. Follow-up care is a key part of your treatment and safety. Be sure to make and go to all appointments, and call your doctor if you are having problems. It's also a good idea to know your test results and keep a list of the medicines you take. How can you care for yourself at home? Be realistic · If you have a large task to do, break it up into smaller steps you can handle, and just do what you can. · You may want to put off important decisions until your depression has lifted. If you have plans that will have a major impact on your life, such as marriage, divorce, or a job change, try to wait a bit. Talk it over with friends and loved ones who can help you look at the overall picture first. 
· Reaching out to people for help is important. Do not isolate yourself. Let your family and friends help you. Find someone you can trust and confide in, and talk to that person. · Be patient, and be kind to yourself. Remember that depression is not your fault and is not something you can overcome with willpower alone. Treatment is necessary for depression, just like for any other illness. Feeling better takes time, and your mood will improve little by little. Stay active · Stay busy and get outside. Take a walk, or try some other light exercise. · Talk with your doctor about an exercise program. Exercise can help with mild depression. · Go to a movie or concert. Take part in a Tenriism activity or other social gathering. Go to a ball game. · Ask a friend to have dinner with you. Take care of yourself · Eat a balanced diet with plenty of fresh fruits and vegetables, whole grains, and lean protein. If you have lost your appetite, eat small snacks rather than large meals. · Avoid drinking alcohol or using illegal drugs. Do not take medicines that have not been prescribed for you. They may interfere with medicines you may be taking for depression, or they may make your depression worse. · Take your medicines exactly as they are prescribed. You may start to feel better within 1 to 3 weeks of taking antidepressant medicine. But it can take as many as 6 to 8 weeks to see more improvement. If you have questions or concerns about your medicines, or if you do not notice any improvement by 3 weeks, talk to your doctor. · If you have any side effects from your medicine, tell your doctor. Antidepressants can make you feel tired, dizzy, or nervous. Some people have dry mouth, constipation, headaches, sexual problems, or diarrhea. Many of these side effects are mild and will go away on their own after you have been taking the medicine for a few weeks. Some may last longer. Talk to your doctor if side effects are bothering you too much. You might be able to try a different medicine. · Get enough sleep. If you have problems sleeping: ¨ Go to bed at the same time every night, and get up at the same time every morning. ¨ Keep your bedroom dark and quiet. ¨ Do not exercise after 5:00 p.m. ¨ Avoid drinks with caffeine after 5:00 p.m. · Avoid sleeping pills unless they are prescribed by the doctor treating your depression. Sleeping pills may make you groggy during the day, and they may interact with other medicine you are taking. · If you have any other illnesses, such as diabetes, heart disease, or high blood pressure, make sure to continue with your treatment. Tell your doctor about all of the medicines you take, including those with or without a prescription. · Keep the numbers for these national suicide hotlines: 8-157-114-TALK (4-353.205.7406) and 7-428-RBZJQJC (0-465.915.6802). If you or someone you know talks about suicide or feeling hopeless, get help right away. When should you call for help? Call 911 anytime you think you may need emergency care. For example, call if: 
· You feel like hurting yourself or someone else. · Someone you know has depression and is about to attempt or is attempting suicide. Call your doctor now or seek immediate medical care if: 
· You hear voices. · Someone you know has depression and: 
¨ Starts to give away his or her possessions. ¨ Uses illegal drugs or drinks alcohol heavily. ¨ Talks or writes about death, including writing suicide notes or talking about guns, knives, or pills. ¨ Starts to spend a lot of time alone. ¨ Acts very aggressively or suddenly appears calm. Watch closely for changes in your health, and be sure to contact your doctor if: 
· You do not get better as expected. Where can you learn more? Go to http://gwendolyn-gay.info/. Enter G004 in the search box to learn more about \"Recovering From Depression: Care Instructions. \" Current as of: July 26, 2016 Content Version: 11.1 © 4863-3373 [employer]. Care instructions adapted under license by [employer] (which disclaims liability or warranty for this information). If you have questions about a medical condition or this instruction, always ask your healthcare professional. [address] any warranty or liability for your use of this information. Introducing [facility]!    
 [facility] introduces [employer] patient portal. Now you can access parts of your medical record, email your doctor's office, and request medication refills online. 1. In your internet browser, go to [email or web address] 2. Click on the First Time User? Click Here link in the Sign In box. You will see the New Member Sign Up page. 3. Enter your [employer] Access Code exactly as it appears below. You will not need to use this code after youve completed the sign-up process. If you do not sign up before the expiration date, you must request a new code. · [employer] Access Code: [email or web address] Expires: 6/14/2017 10:48 AM 
 
4. Enter the last four digits of your Social Security Number (xxxx) and Date of Birth (mm/dd/yyyy) as indicated and click Submit. You will be taken to the next sign-up page. 5. Create a [employer]vant[employer] ID. This will be your [employer] login ID and cannot be changed, so think of one that is secure and easy to remember. 6. Create a [employer] password. You can change your password at any time. 7. Enter your Password Reset Question and Answer. This can be used at a later time if you forget your password. 8. Enter your e-mail address. You will receive e-mail notification when new information is available in [address] 9. Click Sign Up. You can now view and download portions of your medical record. 10. Click the Download Summary menu link to download a portable copy of your medical information. If you have questions, please visit the Frequently Asked Questions section of the [employer] website. Remember, [employer] is NOT to be used for urgent needs. For medical emergencies, dial 911. Now available from your iPhone and Android! Please provide this summary of care documentation to your next provider. Your primary care clinician is listed as PAUL Medina. If you have any questions after today's visit, please call [phone].

## 2017-04-13 ENCOUNTER — HOSPITAL ENCOUNTER (OUTPATIENT)
Dept: LAB | Age: 82
Discharge: HOME | End: 2017-04-13
Payer: MEDICARE

## 2017-04-13 ENCOUNTER — OFFICE VISIT (OUTPATIENT)
Dept: FAMILY MEDICINE CLINIC | Age: 82
End: 2017-04-13

## 2017-04-13 VITALS
WEIGHT: 171 LBS | SYSTOLIC BLOOD PRESSURE: 124 MMHG | RESPIRATION RATE: 17 BRPM | HEART RATE: 61 BPM | HEIGHT: 66 IN | TEMPERATURE: 96 F | BODY MASS INDEX: 27.48 KG/M2 | DIASTOLIC BLOOD PRESSURE: 48 MMHG

## 2017-04-13 DIAGNOSIS — E55.9 VITAMIN D DEFICIENCY: ICD-10-CM

## 2017-04-13 DIAGNOSIS — I10 ESSENTIAL HYPERTENSION: ICD-10-CM

## 2017-04-13 DIAGNOSIS — E78.2 MIXED HYPERLIPIDEMIA: Primary | ICD-10-CM

## 2017-04-13 DIAGNOSIS — M89.8X9 BONE PAIN: ICD-10-CM

## 2017-04-13 LAB — 25(OH)D3 SERPL-MCNC: 35.8 NG/ML (ref 30–100)

## 2017-04-13 PROCEDURE — 82306 VITAMIN D 25 HYDROXY: CPT | Performed by: INTERNAL MEDICINE

## 2017-04-13 PROCEDURE — 36415 COLL VENOUS BLD VENIPUNCTURE: CPT | Performed by: INTERNAL MEDICINE

## 2017-04-13 RX ORDER — NIFEDIPINE 30 MG/1
30 TABLET, FILM COATED, EXTENDED RELEASE ORAL DAILY
Qty: 90 TAB | Refills: 3 | Status: SHIPPED | OUTPATIENT
Start: 2017-04-13 | End: 2017-04-13 | Stop reason: SDUPTHER

## 2017-04-13 RX ORDER — ASPIRIN 81 MG/1
81 TABLET ORAL DAILY
Qty: 90 TAB | Refills: 3 | Status: SHIPPED | OUTPATIENT
Start: 2017-04-13

## 2017-04-13 RX ORDER — NIFEDIPINE 30 MG/1
30 TABLET, FILM COATED, EXTENDED RELEASE ORAL DAILY
Qty: 90 TAB | Refills: 3 | Status: SHIPPED | OUTPATIENT
Start: 2017-04-13 | End: 2017-04-20 | Stop reason: ALTCHOICE

## 2017-04-13 NOTE — MR AVS SNAPSHOT
Visit Information Date & Time Provider Department Dept. Phone Encounter #  
 4/13/2017  8:45 AM Xander Copeland, [address] [place] [address] [phone] [id number] Follow-up Instructions Return in about 3 months (around 7/13/2017) for 30 minute slot. and BP check next week. Toney Grey Upcoming Health Maintenance Date Due  
 GLAUCOMA SCREENING Q2Y 4/15/2017 DTaP/Tdap/Td series (1 - Tdap) 6/7/2017* MEDICARE YEARLY EXAM 2/11/2018 *Topic was postponed. The date shown is not the original due date. Allergies as of 4/13/2017  Review Complete On: 4/13/2017 By: Xander Copeland MD  
  
 Severity Noted Reaction Type Reactions Ace Inhibitors  11/29/2010    Cough Codeine  11/19/2010    Other (comments)  
 headache Fosamax [Alendronate]  11/19/2010    Other (comments) peridontal pain Procardia [Nifedipine]  11/19/2010    Swelling Current Immunizations  Reviewed on 9/9/2016 Name Date Influenza High Dose Vaccine PF 11/15/2016 Influenza Vaccine 11/5/2014, 10/8/2013 Influenza Vaccine (Quad) 9/8/2015 12:00 PM  
 Pneumococcal Conjugate (PCV-13) 11/15/2016 Pneumococcal Vaccine (Unspecified Type) 11/7/2011 Not reviewed this visit You Were Diagnosed With   
  
 Codes Comments Mixed hyperlipidemia    -  Primary ICD-10-CM: P85.8 ICD-9-CM: 272.2 Essential hypertension     ICD-10-CM: I10 
ICD-9-CM: 401.9 Vitals BP Pulse Temp Resp Height(growth percentile) Weight(growth percentile) 124/48 61 96 °F (35.6 °C) (Oral) 17 5' 6\" (1.676 m) 171 lb (77.6 kg) BMI Smoking Status 27.6 kg/m2 Former Smoker Vitals History BMI and BSA Data Body Mass Index Body Surface Area  
 27.6 kg/m 2 1.9 m 2 Preferred Pharmacy Pharmacy Name Phone [facility] [address], [address] [place] [address] [phone] Your Updated Medication List  
  
   
 This list is accurate as of: 4/13/17  9:02 AM.  Always use your most recent med list.  
  
  
  
  
 albuterol 90 mcg/actuation inhaler Commonly known as:  PROVENTIL HFA, VENTOLIN HFA, PROAIR HFA Take 1 Puff by inhalation every four (4) hours as needed for Wheezing. aspirin delayed-release 81 mg tablet Take 1 Tab by mouth daily. atorvastatin 40 mg tablet Commonly known as:  LIPITOR Take 1 Tab by mouth daily. calcium-cholecalciferol (D3) tablet Commonly known as:  CALCARB 600 WITH VITAMIN D Take 1 Tab by mouth two (2) times a day. cyanocobalamin 1,000 mcg tablet Commonly known as:  VITAMIN B-12 Take 1 Tab by mouth daily. labetalol 100 mg tablet Commonly known as:  Lubertha Cai Take 1/2 tablet twice per day. NIFEdipine ER 30 mg ER tablet Commonly known as:  ADALAT CC Take 1 Tab by mouth daily. pravastatin 40 mg tablet Commonly known as:  PRAVACHOL Take 1 Tab by mouth nightly. venlafaxine-SR 37.5 mg capsule Commonly known as:  EFFEXOR-XR Take 1 Cap by mouth daily. Prescriptions Printed Refills NIFEdipine ER (ADALAT CC) 30 mg ER tablet 3 Sig: Take 1 Tab by mouth daily. Class: Print Route: Oral  
 aspirin delayed-release 81 mg tablet 3 Sig: Take 1 Tab by mouth daily. Class: Print Route: Oral  
  
Follow-up Instructions Return in about 3 months (around 7/13/2017) for 30 minute slot. and BP check next week. .  
  
  
Patient Instructions Be sure to come in next week to recheck your blood pressure. Introducing [facility]! Ashlyn Naylor introduces [employer] patient portal. Now you can access parts of your medical record, email your doctor's office, and request medication refills online. 1. In your internet browser, go to [email or web address] 2. Click on the First Time User? Click Here link in the Sign In box. You will see the New Member Sign Up page. 3. Enter your [employer] Access Code exactly as it appears below. You will not need to use this code after youve completed the sign-up process. If you do not sign up before the expiration date, you must request a new code. · [employer] Access Code: [email or web address] Expires: 6/14/2017 10:48 AM 
 
4. Enter the last four digits of your Social Security Number (xxxx) and Date of Birth (mm/dd/yyyy) as indicated and click Submit. You will be taken to the next sign-up page. 5. Create a [employer] ID. This will be your [employer] login ID and cannot be changed, so think of one that is secure and easy to remember. 6. Create a [employer] password. You can change your password at any time. 7. Enter your Password Reset Question and Answer. This can be used at a later time if you forget your password. 8. Enter your e-mail address. You will receive e-mail notification when new information is available in [address] 9. Click Sign Up. You can now view and download portions of your medical record. 10. Click the Download Summary menu link to download a portable copy of your medical information. If you have questions, please visit the Frequently Asked Questions section of the [employer] website. Remember, [employer] is NOT to be used for urgent needs. For medical emergencies, dial 911. Now available from your iPhone and Android! Please provide this summary of care documentation to your next provider. Your primary care clinician is listed as PAUL Medina. If you have any questions after today's visit, please call [phone].

## 2017-04-13 NOTE — PROGRESS NOTES
Ke Tapia is a 80 y.o. male and presents with Follow Up Chronic Condition; Depression; Coronary Artery Disease; and Other (Abdominal aortic aneurysm)       Subjective:    htn- bp very low today. No CP or SOB. No lightheadedness. No syncope/near syncope. Taking all meds. Bones aching- takes an otc vit D supplement. Depression- feels better. No longer hopeless. Assessment/Plan:    htn- bp too low today- reducing procardia to 30mg and discussed with pt. Pt asked to get bp recheck next week. AAA- trying to keep bp well controlled but diastolic unacceptably low today. Depression- appears to have responded well to effexor- declines going to psychology despite encouragement. pt will continue effexor. Bone pain- poss vit d def- check level. RTC in 3 months  Orders Placed This Encounter    DISCONTD: NIFEdipine ER (ADALAT CC) 30 mg ER tablet     Sig: Take 1 Tab by mouth daily. Dispense:  90 Tab     Refill:  3    NIFEdipine ER (ADALAT CC) 30 mg ER tablet     Sig: Take 1 Tab by mouth daily. Dispense:  90 Tab     Refill:  3    aspirin delayed-release 81 mg tablet     Sig: Take 1 Tab by mouth daily. Dispense:  90 Tab     Refill:  3           ROS:  Negative except as mentioned above  Cardiac- no chest pain or palpitations  Pulmonary- no sob or wheezes  GI- no n/v or diarrhea. SH:  Social History   Substance Use Topics    Smoking status: Former Smoker     Years: 8.00     Quit date: 11/7/1953    Smokeless tobacco: Never Used    Alcohol use No         Medications/Allergies:  Current Outpatient Prescriptions on File Prior to Visit   Medication Sig Dispense Refill    venlafaxine-SR (EFFEXOR-XR) 37.5 mg capsule Take 1 Cap by mouth daily. 30 Cap 5    pravastatin (PRAVACHOL) 40 mg tablet Take 1 Tab by mouth nightly. 90 Tab 3    atorvastatin (LIPITOR) 40 mg tablet Take 1 Tab by mouth daily. 30 Tab 11    NIFEdipine ER (ADALAT CC) 60 mg ER tablet Take 1 Tab by mouth daily.  90 Tab 3    labetalol (NORMODYNE) 100 mg tablet Take 1/2 tablet twice per day. 180 Tab 3    albuterol (PROVENTIL HFA, VENTOLIN HFA, PROAIR HFA) 90 mcg/actuation inhaler Take 1 Puff by inhalation every four (4) hours as needed for Wheezing. 1 Inhaler 1    aspirin delayed-release 81 mg tablet Take 1 Tab by mouth daily. 90 Tab 3    cyanocobalamin (VITAMIN B-12) 1,000 mcg tablet Take 1 Tab by mouth daily. 90 Tab 3    calcium-cholecalciferol, D3, (CALCARB 600 WITH VITAMIN D) tablet Take 1 Tab by mouth two (2) times a day. 180 Tab 3     No current facility-administered medications on file prior to visit. Allergies   Allergen Reactions    Ace Inhibitors Cough    Codeine Other (comments)     headache    Fosamax [Alendronate] Other (comments)     peridontal pain    Procardia [Nifedipine] Swelling       Objective:  Visit Vitals    /48    Pulse 61    Temp 96 °F (35.6 °C) (Oral)    Resp 17    Ht 5' 6\" (1.676 m)    Wt 171 lb (77.6 kg)    BMI 27.6 kg/m2    Body mass index is 27.6 kg/(m^2). Constitutional: Well developed, nourished, no distress, alert   CV: S1, S2.  RRR. No murmurs/rubs. No edema. psych Mood good- laughs. Affect -normal.    Pulm: No abnormalities on inspection. Clear to auscultation bilaterally. No wheezing/rhonchi. Normal effort. GI: Soft, nontender, nondistended. Normal active bowel sounds. No  masses on palpation. No hepatosplenomegaly. BMI- ok- no changes.

## 2017-04-13 NOTE — PROGRESS NOTES
1. Have you been to the ER, urgent care clinic since your last visit? Hospitalized since your last visit? No.     2. Have you seen or consulted any other health care providers outside of the [address] [place] [address] since your last visit? Include any pap smears or colon screening. Yes, saw his cardiologist in 1/2017. Patient presents with follow up depression, abdominal aortic aneurysm and CAD. Need a written prescription for aspirin 81mg to take at the [facility]

## 2017-04-20 ENCOUNTER — CLINICAL SUPPORT (OUTPATIENT)
Dept: FAMILY MEDICINE CLINIC | Age: 82
End: 2017-04-20

## 2017-04-20 VITALS
HEIGHT: 66 IN | TEMPERATURE: 96.4 F | HEART RATE: 61 BPM | RESPIRATION RATE: 16 BRPM | DIASTOLIC BLOOD PRESSURE: 37 MMHG | SYSTOLIC BLOOD PRESSURE: 116 MMHG

## 2017-04-20 DIAGNOSIS — S93.401A SPRAIN OF RIGHT ANKLE, UNSPECIFIED LIGAMENT, INITIAL ENCOUNTER: ICD-10-CM

## 2017-04-20 DIAGNOSIS — I10 ESSENTIAL HYPERTENSION: Primary | ICD-10-CM

## 2017-04-20 NOTE — PROGRESS NOTES
1. Have you been to the ER, urgent care clinic since your last visit? Hospitalized since your last visit? No.     2. Have you seen or consulted any other health care providers outside of the [address] [place] [address] since your last visit? Include any pap smears or colon screening. No.    Patient presents with follow up blood pressure and complaining of right ankle pain from falling off a chair Monday 4/17/2017.

## 2017-04-20 NOTE — PATIENT INSTRUCTIONS
Ankle Sprain: Care Instructions  Your Care Instructions    An ankle sprain can happen when you twist your ankle. The ligaments that support the ankle can get stretched and torn. Often the ankle is swollen and painful. Ankle sprains may take from several weeks to several months to heal. Usually, the more pain and swelling you have, the more severe your ankle sprain is and the longer it will take to heal. You can heal faster and regain strength in your ankle with good home treatment. It is very important to give your ankle time to heal completely, so that you do not easily hurt your ankle again. Follow-up care is a key part of your treatment and safety. Be sure to make and go to all appointments, and call your doctor if you are having problems. It's also a good idea to know your test results and keep a list of the medicines you take. How can you care for yourself at home? · Prop up your foot on pillows as much as possible for the next 3 days. Try to keep your ankle above the level of your heart. This will help reduce the swelling. · Follow your doctor's directions for wearing a splint or elastic bandage. Wrapping the ankle may help reduce or prevent swelling. · Your doctor may give you a splint, a brace, an air stirrup, or another form of ankle support to protect your ankle until it is healed. Wear it as directed while your ankle is healing. Do not remove it unless your doctor tells you to. After your ankle has healed, ask your doctor whether you should wear the brace when you exercise. · Put ice or cold packs on your injured ankle for 10 to 20 minutes at a time. Try to do this every 1 to 2 hours for the next 3 days (when you are awake) or until the swelling goes down. Put a thin cloth between the ice and your skin. · You may need to use crutches until you can walk without pain. If you do use crutches, try to bear some weight on your injured ankle if you can do so without pain.  This helps the ankle heal.  · Take pain medicines exactly as directed. ¨ If the doctor gave you a prescription medicine for pain, take it as prescribed. ¨ If you are not taking a prescription pain medicine, ask your doctor if you can take an over-the-counter medicine. · If you have been given ankle exercises to do at home, do them exactly as instructed. These can promote healing and help prevent lasting weakness. When should you call for help? Call your doctor now or seek immediate medical care if:  · Your pain is getting worse. · Your swelling is getting worse. · Your splint feels too tight or you are unable to loosen it. Watch closely for changes in your health, and be sure to contact your doctor if:  · You are not getting better after 1 week. Where can you learn more? Go to http://gwendolyn-gay.info/. Enter U588 in the search box to learn more about \"Ankle Sprain: Care Instructions. \"  Current as of: May 23, 2016  Content Version: 11.2  © 8481-2578 [employer]. Care instructions adapted under license by [employer] (which disclaims liability or warranty for this information). If you have questions about a medical condition or this instruction, always ask your healthcare professional. [place] [address] any warranty or liability for your use of this information.

## 2017-04-20 NOTE — PROGRESS NOTES
Preston Guzmán is a 80 y.o. male and presents with Blood Pressure Check and Ankle Pain (Right from falling monday )       Subjective:    Here for bp check - reduced procardia to 30 mg. Fell 4 days ago- was standing on chair opening AC vent and fell to floor. Pain started next day in ankle and     Assessment/Plan:    HTN- diastolic still low - will stop procardia entirely today- pt understands this. S/p fall- right ankle sprain- monitor and if worsening will do xray. RTC in one week for bp check. No orders of the defined types were placed in this encounter. ROS:  Negative except as mentioned above  Cardiac- no chest pain or palpitations  Pulmonary- no sob or wheezes  GI- no n/v or diarrhea. SH:  Social History   Substance Use Topics    Smoking status: Former Smoker     Years: 8.00     Quit date: 11/7/1953    Smokeless tobacco: Never Used    Alcohol use No         Medications/Allergies:  Current Outpatient Prescriptions on File Prior to Visit   Medication Sig Dispense Refill    aspirin delayed-release 81 mg tablet Take 1 Tab by mouth daily. 90 Tab 3    venlafaxine-SR (EFFEXOR-XR) 37.5 mg capsule Take 1 Cap by mouth daily. 30 Cap 5    pravastatin (PRAVACHOL) 40 mg tablet Take 1 Tab by mouth nightly. 90 Tab 3    atorvastatin (LIPITOR) 40 mg tablet Take 1 Tab by mouth daily. 30 Tab 11    labetalol (NORMODYNE) 100 mg tablet Take 1/2 tablet twice per day. 180 Tab 3    albuterol (PROVENTIL HFA, VENTOLIN HFA, PROAIR HFA) 90 mcg/actuation inhaler Take 1 Puff by inhalation every four (4) hours as needed for Wheezing. 1 Inhaler 1    cyanocobalamin (VITAMIN B-12) 1,000 mcg tablet Take 1 Tab by mouth daily. 90 Tab 3    calcium-cholecalciferol, D3, (CALCARB 600 WITH VITAMIN D) tablet Take 1 Tab by mouth two (2) times a day. 180 Tab 3     No current facility-administered medications on file prior to visit.            Allergies   Allergen Reactions    Ace Inhibitors Cough    Codeine Other (comments)     headache    Fosamax [Alendronate] Other (comments)     peridontal pain    Procardia [Nifedipine] Swelling       Objective:  Visit Vitals    BP (!) 116/37    Pulse 61    Temp 96.4 °F (35.8 °C) (Oral)    Resp 16    Ht 5' 6\" (1.676 m)    There is no height or weight on file to calculate BMI. Constitutional: Well developed, nourished, no distress, alert   Right ankle Mildly tender no erythema or increased warmth. No point tenderness. Normal rom   Eyes: Conjunctiva normal. PERRL. CV: S1, S2.  RRR. No murmurs/rubs. No thrills palpated. No carotid bruits. Intact distal pulses. No edema. Pulm: No abnormalities on inspection. Clear to auscultation bilaterally. No wheezing/rhonchi. Normal effort. GI: Soft, nontender, nondistended. Normal active bowel sounds. No  masses on palpation. No hepatosplenomegaly.

## 2017-04-20 NOTE — MR AVS SNAPSHOT
Visit Information Date & Time Provider Department Dept. Phone Encounter #  
 4/20/2017  8:15 AM Luis Miguel Faith, [facility] [phone] [id number] Follow-up Instructions Return in about 1 week (around 4/27/2017) for for bp check . Your Appointments 7/13/2017  8:45 AM  
Follow Up with MD [facility]zi Neri [facility] Appt Note: 3mo f/u  
 Elaine Medhat. [address]  
  
   
 [address]  
  
    
 2/12/2018  9:00 AM  
Office Visit with MD Suzi Neri [facility] Alley[place]jandra Medhat. [address]  
  
   
 [address] Upcoming Health Maintenance Date Due  
 GLAUCOMA SCREENING Q2Y 4/15/2017 DTaP/Tdap/Td series (1 - Tdap) 6/7/2017* MEDICARE YEARLY EXAM 2/11/2018 *Topic was postponed. The date shown is not the original due date. Allergies as of 4/20/2017  Review Complete On: 4/20/2017 By: Luis Miguel Faith MD  
  
 Severity Noted Reaction Type Reactions Ace Inhibitors  11/29/2010    Cough Codeine  11/19/2010    Other (comments)  
 headache Fosamax [Alendronate]  11/19/2010    Other (comments) peridontal pain Procardia [Nifedipine]  11/19/2010    Swelling Current Immunizations  Reviewed on 9/9/2016 Name Date Influenza High Dose Vaccine PF 11/15/2016 Influenza Vaccine 11/5/2014, 10/8/2013 Influenza Vaccine (Quad) 9/8/2015 12:00 PM  
 Pneumococcal Conjugate (PCV-13) 11/15/2016 Pneumococcal Vaccine (Unspecified Type) 11/7/2011 Not reviewed this visit You Were Diagnosed With   
  
 Codes Comments Essential hypertension    -  Primary ICD-10-CM: I10 
ICD-9-CM: 401.9 Sprain of right ankle, unspecified ligament, initial encounter     ICD-10-CM: S93.401A ICD-9-CM: 845.00 Vitals BP Pulse Temp Resp Height(growth percentile) Smoking Status (!) 116/37 61 96.4 °F (35.8 °C) (Oral) 16 5' 6\" (1.676 m) Former Smoker Vitals History Preferred Pharmacy Pharmacy Name Phone Jesu Doty [address], [address] [facility] [phone] Your Updated Medication List  
  
   
This list is accurate as of: 4/20/17  8:28 AM.  Always use your most recent med list.  
  
  
  
  
 albuterol 90 mcg/actuation inhaler Commonly known as:  PROVENTIL HFA, VENTOLIN HFA, PROAIR HFA Take 1 Puff by inhalation every four (4) hours as needed for Wheezing. aspirin delayed-release 81 mg tablet Take 1 Tab by mouth daily. atorvastatin 40 mg tablet Commonly known as:  LIPITOR Take 1 Tab by mouth daily. calcium-cholecalciferol (D3) tablet Commonly known as:  CALCARB 600 WITH VITAMIN D Take 1 Tab by mouth two (2) times a day. cyanocobalamin 1,000 mcg tablet Commonly known as:  VITAMIN B-12 Take 1 Tab by mouth daily. labetalol 100 mg tablet Commonly known as:  [place] Take 1/2 tablet twice per day. pravastatin 40 mg tablet Commonly known as:  PRAVACHOL Take 1 Tab by mouth nightly. venlafaxine-SR 37.5 mg capsule Commonly known as:  EFFEXOR-XR Take 1 Cap by mouth daily. Follow-up Instructions Return in about 1 week (around 4/27/2017) for for bp check . Patient Instructions Ankle Sprain: Care Instructions Your Care Instructions An ankle sprain can happen when you twist your ankle. The ligaments that support the ankle can get stretched and torn. Often the ankle is swollen and painful. Ankle sprains may take from several weeks to several months to heal. Usually, the more pain and swelling you have, the more severe your ankle sprain is and the longer it will take to heal. You can heal faster and regain strength in your ankle with good home treatment. It is very important to give your ankle time to heal completely, so that you do not easily hurt your ankle again. Follow-up care is a key part of your treatment and safety. Be sure to make and go to all appointments, and call your doctor if you are having problems. It's also a good idea to know your test results and keep a list of the medicines you take. How can you care for yourself at home? · Prop up your foot on pillows as much as possible for the next 3 days. Try to keep your ankle above the level of your heart. This will help reduce the swelling. · Follow your doctor's directions for wearing a splint or elastic bandage. Wrapping the ankle may help reduce or prevent swelling. · Your doctor may give you a splint, a brace, an air stirrup, or another form of ankle support to protect your ankle until it is healed. Wear it as directed while your ankle is healing. Do not remove it unless your doctor tells you to. After your ankle has healed, ask your doctor whether you should wear the brace when you exercise. · Put ice or cold packs on your injured ankle for 10 to 20 minutes at a time. Try to do this every 1 to 2 hours for the next 3 days (when you are awake) or until the swelling goes down. Put a thin cloth between the ice and your skin. · You may need to use crutches until you can walk without pain. If you do use crutches, try to bear some weight on your injured ankle if you can do so without pain. This helps the ankle heal. 
· Take pain medicines exactly as directed. ¨ If the doctor gave you a prescription medicine for pain, take it as prescribed. ¨ If you are not taking a prescription pain medicine, ask your doctor if you can take an over-the-counter medicine. · If you have been given ankle exercises to do at home, do them exactly as instructed. These can promote healing and help prevent lasting weakness. When should you call for help? Call your doctor now or seek immediate medical care if: · Your pain is getting worse. · Your swelling is getting worse. · Your splint feels too tight or you are unable to loosen it. Watch closely for changes in your health, and be sure to contact your doctor if: 
· You are not getting better after 1 week. Where can you learn more? Go to http://gwendolyn-gay.info/. Enter T862 in the search box to learn more about \"Ankle Sprain: Care Instructions. \" Current as of: May 23, 2016 Content Version: 11.2 © 9805-9860 [employer] Care instructions adapted under license by [employer] (which disclaims liability or warranty for this information). If you have questions about a medical condition or this instruction, always ask your healthcare professional. [address] any warranty or liability for your use of this information. Introducing \[facility]\"" & HEALTH SERVICES! Trenton Moeller introduces [employer] patient portal. Now you can access parts of your medical record, email your doctor's office, and request medication refills online. 1. In your internet browser, go to [email or web address] 2. Click on the First Time User? Click Here link in the Sign In box. You will see the New Member Sign Up page. 3. Enter your [employer] Access Code exactly as it appears below. You will not need to use this code after youve completed the sign-up process. If you do not sign up before the expiration date, you must request a new code. · [employer] Access Code: [email or web address] Expires: 6/14/2017 10:48 AM 
 
4. Enter the last four digits of your Social Security Number (xxxx) and Date of Birth (mm/dd/yyyy) as indicated and click Submit. You will be taken to the next sign-up page. 5. Create a [employer] Itt ID. This will be your [employer] login ID and cannot be changed, so think of one that is secure and easy to remember. 6. Create a [employer]t password. You can change your password at any time. 7. Enter your Password Reset Question and Answer. This can be used at a later time if you forget your password. 8. Enter your e-mail address. You will receive e-mail notification when new information is available in [address] 9. Click Sign Up. You can now view and download portions of your medical record. 10. Click the Download Summary menu link to download a portable copy of your medical information. If you have questions, please visit the Frequently Asked Questions section of the [employer] website. Remember, [employer] is NOT to be used for urgent needs. For medical emergencies, dial 911. Now available from your iPhone and Android! Please provide this summary of care documentation to your next provider. Your primary care clinician is listed as PAUL Medina. If you have any questions after today's visit, please call [phone].

## 2017-04-27 ENCOUNTER — CLINICAL SUPPORT (OUTPATIENT)
Dept: FAMILY MEDICINE CLINIC | Age: 82
End: 2017-04-27

## 2017-04-27 ENCOUNTER — HOSPITAL ENCOUNTER (OUTPATIENT)
Dept: LAB | Age: 82
Discharge: HOME | End: 2017-04-27
Payer: MEDICARE

## 2017-04-27 VITALS
HEIGHT: 66 IN | HEART RATE: 62 BPM | SYSTOLIC BLOOD PRESSURE: 105 MMHG | TEMPERATURE: 97.3 F | RESPIRATION RATE: 16 BRPM | DIASTOLIC BLOOD PRESSURE: 34 MMHG

## 2017-04-27 DIAGNOSIS — I10 ESSENTIAL HYPERTENSION: ICD-10-CM

## 2017-04-27 DIAGNOSIS — I95.9 HYPOTENSION, UNSPECIFIED HYPOTENSION TYPE: ICD-10-CM

## 2017-04-27 DIAGNOSIS — M25.571 ACUTE RIGHT ANKLE PAIN: ICD-10-CM

## 2017-04-27 DIAGNOSIS — I10 ESSENTIAL HYPERTENSION: Primary | ICD-10-CM

## 2017-04-27 LAB
ALBUMIN SERPL BCP-MCNC: 3.4 G/DL (ref 3.4–5)
ALBUMIN/GLOB SERPL: 1.2 {RATIO} (ref 0.8–1.7)
ALP SERPL-CCNC: 124 U/L (ref 45–117)
ALT SERPL-CCNC: 16 U/L (ref 16–61)
ANION GAP BLD CALC-SCNC: 9 MMOL/L (ref 3–18)
AST SERPL W P-5'-P-CCNC: 19 U/L (ref 15–37)
BASOPHILS # BLD AUTO: 0 K/UL (ref 0–0.06)
BASOPHILS # BLD: 0 % (ref 0–2)
BILIRUB SERPL-MCNC: 0.6 MG/DL (ref 0.2–1)
BUN SERPL-MCNC: 18 MG/DL (ref 7–18)
BUN/CREAT SERPL: 14 (ref 12–20)
CALCIUM SERPL-MCNC: 8.6 MG/DL (ref 8.5–10.1)
CHLORIDE SERPL-SCNC: 105 MMOL/L (ref 100–108)
CO2 SERPL-SCNC: 25 MMOL/L (ref 21–32)
CORTIS AM PEAK SERPL-MCNC: 21.2 UG/DL (ref 4.3–22.4)
CREAT SERPL-MCNC: 1.28 MG/DL (ref 0.6–1.3)
DIFFERENTIAL METHOD BLD: ABNORMAL
EOSINOPHIL # BLD: 0.4 K/UL (ref 0–0.4)
EOSINOPHIL NFR BLD: 4 % (ref 0–5)
ERYTHROCYTE [DISTWIDTH] IN BLOOD BY AUTOMATED COUNT: 13.1 % (ref 11.6–14.5)
GLOBULIN SER CALC-MCNC: 2.8 G/DL (ref 2–4)
GLUCOSE SERPL-MCNC: 88 MG/DL (ref 74–99)
HCT VFR BLD AUTO: 39.6 % (ref 36–48)
HGB BLD-MCNC: 12.8 G/DL (ref 13–16)
LYMPHOCYTES # BLD AUTO: 36 % (ref 21–52)
LYMPHOCYTES # BLD: 3 K/UL (ref 0.9–3.6)
MCH RBC QN AUTO: 31.7 PG (ref 24–34)
MCHC RBC AUTO-ENTMCNC: 32.3 G/DL (ref 31–37)
MCV RBC AUTO: 98 FL (ref 74–97)
MONOCYTES # BLD: 0.9 K/UL (ref 0.05–1.2)
MONOCYTES NFR BLD AUTO: 11 % (ref 3–10)
NEUTS SEG # BLD: 4 K/UL (ref 1.8–8)
NEUTS SEG NFR BLD AUTO: 49 % (ref 40–73)
PLATELET # BLD AUTO: 214 K/UL (ref 135–420)
PMV BLD AUTO: 9.7 FL (ref 9.2–11.8)
POTASSIUM SERPL-SCNC: 4 MMOL/L (ref 3.5–5.5)
PROT SERPL-MCNC: 6.2 G/DL (ref 6.4–8.2)
RBC # BLD AUTO: 4.04 M/UL (ref 4.7–5.5)
SODIUM SERPL-SCNC: 139 MMOL/L (ref 136–145)
WBC # BLD AUTO: 8.3 K/UL (ref 4.6–13.2)

## 2017-04-27 PROCEDURE — 85025 COMPLETE CBC W/AUTO DIFF WBC: CPT | Performed by: INTERNAL MEDICINE

## 2017-04-27 PROCEDURE — 36415 COLL VENOUS BLD VENIPUNCTURE: CPT | Performed by: INTERNAL MEDICINE

## 2017-04-27 PROCEDURE — 80053 COMPREHEN METABOLIC PANEL: CPT | Performed by: INTERNAL MEDICINE

## 2017-04-27 PROCEDURE — 82533 TOTAL CORTISOL: CPT | Performed by: INTERNAL MEDICINE

## 2017-04-27 NOTE — MR AVS SNAPSHOT
Visit Information Date & Time Provider Department Dept. Phone Encounter #  
 4/27/2017  8:15 AM Cra Whitehead, [facility] [phone] [id number] Follow-up Instructions Return in about 4 weeks (around 5/25/2017) for 30 minutes and bp check and xray on Monday 5/1. Your Appointments 7/13/2017  8:45 AM  
Follow Up with MD Suzi Justin [address] [place] [address]) Appt Note: 3mo f/u  
 [facility] [address]  
  
   
 [address]  
  
    
 2/12/2018  9:00 AM  
Office Visit with Car Whitehead MD  
[facility] [address] [place] [address]) [facility] [place] [address]  
  
   
 [address] Upcoming Health Maintenance Date Due  
 GLAUCOMA SCREENING Q2Y 4/15/2017 DTaP/Tdap/Td series (1 - Tdap) 6/7/2017* MEDICARE YEARLY EXAM 2/11/2018 *Topic was postponed. The date shown is not the original due date. Allergies as of 4/27/2017  Review Complete On: 4/27/2017 By: Car Whitehead MD  
  
 Severity Noted Reaction Type Reactions Ace Inhibitors  11/29/2010    Cough Codeine  11/19/2010    Other (comments)  
 headache Fosamax [Alendronate]  11/19/2010    Other (comments) peridontal pain Procardia [Nifedipine]  11/19/2010    Swelling Current Immunizations  Reviewed on 9/9/2016 Name Date Influenza High Dose Vaccine PF 11/15/2016 Influenza Vaccine 11/5/2014, 10/8/2013 Influenza Vaccine (Quad) 9/8/2015 12:00 PM  
 Pneumococcal Conjugate (PCV-13) 11/15/2016 Pneumococcal Vaccine (Unspecified Type) 11/7/2011 Not reviewed this visit You Were Diagnosed With   
  
 Codes Comments Essential hypertension    -  Primary ICD-10-CM: I10 
ICD-9-CM: 401.9 Hypotension, unspecified hypotension type     ICD-10-CM: I95.9 ICD-9-CM: 458.9 Acute right ankle pain     ICD-10-CM: M25.571 ICD-9-CM: 719.47, 338.19 Vitals BP Pulse Temp Resp Height(growth percentile) Smoking Status (!) 105/34 62 97.3 °F (36.3 °C) (Oral) 16 5' 6\" (1.676 m) Former Smoker Vitals History Preferred Pharmacy Pharmacy Name Phone [facility] [phone], [address] [place] [address] [phone] Your Updated Medication List  
  
   
This list is accurate as of: 4/27/17  9:05 AM.  Always use your most recent med list.  
  
  
  
  
 albuterol 90 mcg/actuation inhaler Commonly known as:  PROVENTIL HFA, VENTOLIN HFA, PROAIR HFA Take 1 Puff by inhalation every four (4) hours as needed for Wheezing. aspirin delayed-release 81 mg tablet Take 1 Tab by mouth daily. atorvastatin 40 mg tablet Commonly known as:  LIPITOR Take 1 Tab by mouth daily. calcium-cholecalciferol (D3) tablet Commonly known as:  CALCARB 600 WITH VITAMIN D Take 1 Tab by mouth two (2) times a day. cyanocobalamin 1,000 mcg tablet Commonly known as:  VITAMIN B-12 Take 1 Tab by mouth daily. labetalol 100 mg tablet Commonly known as:  Jose Dowse Take 1/2 tablet twice per day. pravastatin 40 mg tablet Commonly known as:  PRAVACHOL Take 1 Tab by mouth nightly. venlafaxine-SR 37.5 mg capsule Commonly known as:  EFFEXOR-XR Take 1 Cap by mouth daily. Follow-up Instructions Return in about 4 weeks (around 5/25/2017) for 30 minutes and bp check and xray on Monday 5/1. To-Do List   
 04/27/2017 Lab:  CBC WITH AUTOMATED DIFF   
  
 04/27/2017 Lab:  CORTISOL, AM   
  
 04/27/2017 Lab:  METABOLIC PANEL, COMPREHENSIVE   
  
 04/27/2017 Imaging:  XR ANKLE RT MIN 3 V Patient Instructions Calf Strain: Rehab Exercises Your Care Instructions Here are some examples of typical rehabilitation exercises for your condition. Start each exercise slowly. Ease off the exercise if you start to have pain. Your doctor or physical therapist will tell you when you can start these exercises and which ones will work best for you. How to do the exercises Calf wall stretch (back knee straight) 1. Stand facing a wall with your hands on the wall at about eye level. Put your affected leg about a step behind your other leg. 2. Keeping your back leg straight and your back heel on the floor, bend your front knee and gently bring your hip and chest toward the wall until you feel a stretch in the calf of your back leg. 3. Hold the stretch for at least 15 to 30 seconds. 4. Repeat 2 to 4 times. Calf wall stretch (knees bent) 1. Stand facing a wall with your hands on the wall at about eye level. Put your affected leg about a step behind your other leg. 2. Keeping both heels on the floor, bend both knees. Then gently bring your hip and chest toward the wall until you feel a stretch in the calf of your back leg. 3. Hold the stretch for at least 15 to 30 seconds. 4. Repeat 2 to 4 times. Bilateral calf stretch (knees straight) 1. Place a book on the floor a few inches from a wall or countertop, and put the balls of your feet on it. Your heels should be on the floor. The book needs to be thick enough so that you can feel a gentle stretch in each calf. If you are not steady on your feet, hold on to a chair, counter, or wall while you do this stretch. 2. Keep your knees straight, and lean forward until you feel a stretch in each calf. 3. To get more stretch, add another book or use a thicker book, such as a phone book, a dictionary, or an encyclopedia. 4. Hold the stretch for at least 15 to 30 seconds. 5. Repeat 2 to 4 times. Bilateral calf stretch (knees bent) 1. Place a book on the floor a few inches from a wall or countertop, and put the balls of your feet on it. Your heels should be on the floor.  The book needs to be thick enough so that you can feel a gentle stretch in each calf. If you are not steady on your feet, hold on to a chair, counter, or wall while you do this stretch. 2. Bend your knees, and lean forward until you feel a stretch in each calf. 3. To get more stretch, add another book or use a thicker book, such as a phone book, a dictionary, or an encyclopedia. 4. Hold the stretch for at least 15 to 30 seconds. 5. Repeat 2 to 4 times. Ankle plantarflexion 1. Sit with your affected leg straight and supported on the floor. Your other leg should be bent, with that foot flat on the floor. 2. Keeping your affected leg straight, gently flex your foot downward so your toes are pointed away from your body. Then slowly relax your foot to the starting position. 3. Repeat 8 to 12 times. Ankle dorsiflexion 1. Sit with your affected leg straight and supported on the floor. Your other leg should be bent, with that foot flat on the floor. 2. Keeping your leg straight, gently flex your foot back so your toes point upward. Then slowly relax your foot to the starting position. 3. Repeat 8 to 12 times. Bilateral heel raises on step 1. Stand on the bottom step of a staircase, facing up toward the stairs. Put the balls of your feet on the step. If you are not steady on your feet, hold on to the banister or wall. 2. Keeping both knees straight, slowly lift your heels above the step so that you are standing on your toes. Then slowly lower your heels below the step and toward the floor. 3. Return to the starting position, with your feet even with the step. 4. Repeat 8 to 12 times. Follow-up care is a key part of your treatment and safety. Be sure to make and go to all appointments, and call your doctor if you are having problems. It's also a good idea to know your test results and keep a list of the medicines you take. Where can you learn more? Go to http://gwendolyn-gay.info/. Enter B072 in the search box to learn more about \"Calf Strain: Rehab Exercises. \" Current as of: May 23, 2016 Content Version: 11.2 © 0349-0179 3VR, [employer]. Care instructions adapted under license by [employer] (which disclaims liability or warranty for this information). If you have questions about a medical condition or this instruction, always ask your healthcare professional. [address] any warranty or liability for your use of this information. Introducing [facility] HE[facility]! Madalyn Mckeon introduces youbeQ - Maps With Life patient portal. Now you can access parts of your medical record, email your doctor's office, and request medication refills online. 1. In your internet browser, go to [email or web address] 2. Click on the First Time User? Click Here link in the Sign In box. You will see the New Member Sign Up page. 3. Enter your youbeQ - [employer] Access Code exactly as it appears below. You will not need to use this code after youve completed the sign-up process. If you do not sign up before the expiration date, you must request a new code. · youbeQ - Maps [employer] Life Access Code: [email or web address] Expires: 6/14/2017 10:48 AM 
 
4. Enter the last four digits of your Social Security Number (xxxx) and Date of Birth (mm/dd/yyyy) as indicated and click Submit. You will be taken to the next sign-up page. 5. Create a youbeQ - Maps With Life ID. This will be your youbeQ [employer] Life login ID and cannot be changed, so think of one that is secure and easy to remember. 6. Create a youbeQ - [employer] Life password. You can change your password at any time. 7. Enter your Password Reset Question and Answer. This can be used at a later time if you forget your password. 8. Enter your e-mail address. You will receive e-mail notification when new information is available in [address] 9. Click Sign Up. You can now view and download portions of your medical record. 10. Click the Download Summary menu link to download a portable copy of your medical information. If you have questions, please visit the Frequently Asked Questions section of the [employer] website. Remember, [employer] is NOT to be used for urgent needs. For medical emergencies, dial 911. Now available from your iPhone and Android! Please provide this summary of care documentation to your next provider. Your primary care clinician is listed as PAUL Medina. If you have any questions after today's visit, please call [phone].

## 2017-04-27 NOTE — PATIENT INSTRUCTIONS
Calf Strain: Rehab Exercises  Your Care Instructions  Here are some examples of typical rehabilitation exercises for your condition. Start each exercise slowly. Ease off the exercise if you start to have pain. Your doctor or physical therapist will tell you when you can start these exercises and which ones will work best for you. How to do the exercises  Calf wall stretch (back knee straight)    1. Stand facing a wall with your hands on the wall at about eye level. Put your affected leg about a step behind your other leg. 2. Keeping your back leg straight and your back heel on the floor, bend your front knee and gently bring your hip and chest toward the wall until you feel a stretch in the calf of your back leg. 3. Hold the stretch for at least 15 to 30 seconds. 4. Repeat 2 to 4 times. Calf wall stretch (knees bent)    1. Stand facing a wall with your hands on the wall at about eye level. Put your affected leg about a step behind your other leg. 2. Keeping both heels on the floor, bend both knees. Then gently bring your hip and chest toward the wall until you feel a stretch in the calf of your back leg. 3. Hold the stretch for at least 15 to 30 seconds. 4. Repeat 2 to 4 times. Bilateral calf stretch (knees straight)    1. Place a book on the floor a few inches from a wall or countertop, and put the balls of your feet on it. Your heels should be on the floor. The book needs to be thick enough so that you can feel a gentle stretch in each calf. If you are not steady on your feet, hold on to a chair, counter, or wall while you do this stretch. 2. Keep your knees straight, and lean forward until you feel a stretch in each calf. 3. To get more stretch, add another book or use a thicker book, such as a phone book, a dictionary, or an encyclopedia. 4. Hold the stretch for at least 15 to 30 seconds. 5. Repeat 2 to 4 times. Bilateral calf stretch (knees bent)    1.  Place a book on the floor a few inches from a wall or countertop, and put the balls of your feet on it. Your heels should be on the floor. The book needs to be thick enough so that you can feel a gentle stretch in each calf. If you are not steady on your feet, hold on to a chair, counter, or wall while you do this stretch. 2. Bend your knees, and lean forward until you feel a stretch in each calf. 3. To get more stretch, add another book or use a thicker book, such as a phone book, a dictionary, or an encyclopedia. 4. Hold the stretch for at least 15 to 30 seconds. 5. Repeat 2 to 4 times. Ankle plantarflexion    1. Sit with your affected leg straight and supported on the floor. Your other leg should be bent, with that foot flat on the floor. 2. Keeping your affected leg straight, gently flex your foot downward so your toes are pointed away from your body. Then slowly relax your foot to the starting position. 3. Repeat 8 to 12 times. Ankle dorsiflexion    1. Sit with your affected leg straight and supported on the floor. Your other leg should be bent, with that foot flat on the floor. 2. Keeping your leg straight, gently flex your foot back so your toes point upward. Then slowly relax your foot to the starting position. 3. Repeat 8 to 12 times. Bilateral heel raises on step    1. Stand on the bottom step of a staircase, facing up toward the stairs. Put the balls of your feet on the step. If you are not steady on your feet, hold on to the banister or wall. 2. Keeping both knees straight, slowly lift your heels above the step so that you are standing on your toes. Then slowly lower your heels below the step and toward the floor. 3. Return to the starting position, with your feet even with the step. 4. Repeat 8 to 12 times. Follow-up care is a key part of your treatment and safety. Be sure to make and go to all appointments, and call your doctor if you are having problems.  It's also a good idea to know your test results and keep a list of the medicines you take. Where can you learn more? Go to http://gwendolyn-gay.info/. Enter O385 in the search box to learn more about \"Calf Strain: Rehab Exercises. \"  Current as of: May 23, 2016  Content Version: 11.2  © 8805-7399 [employer]. Care instructions adapted under license by [employer] (which disclaims liability or warranty for this information). If you have questions about a medical condition or this instruction, always ask your healthcare professional. [address] any warranty or liability for your use of this information.

## 2017-04-27 NOTE — PROGRESS NOTES
Ronna Vázquez is a 80 y.o. male and presents with Follow Up Chronic Condition; Hypertension; and Ankle Pain (Right from falling.)       Subjective:    Right ankle still painful- using ice but no improvement. 5/10 pain. able to walk but slowly. Swelling at ankle. No f/c. No chest pain. Hypotension -stopped procardia entirely. No cp or sob. No lightheadedness or dizziness. No other new meds. Assessment/Plan:    Right ankle pain- given lack of improvement- will do xray but suspect achilles sprain. Hypotension- asx- off all blood pressure pills. No other obvious cause. Checking lytes but no previous changes c/w addisons. No bleeding sx. May need to add florinef or midodrine. Also ? Aortic valve pathology worsening given pulse pressure. Will make cardiology f/u sooner also- we were able to give him an appt today. RTC in 4 weeks. Orders Placed This Encounter    XR ANKLE RT MIN 3 V     Standing Status:   Future     Standing Expiration Date:   5/27/2018     Order Specific Question:   Reason for Exam     Answer:   pain     Order Specific Question:   Is Patient Allergic to Contrast Dye? Answer:   No    METABOLIC PANEL, COMPREHENSIVE     Standing Status:   Future     Standing Expiration Date:   4/28/2018    CBC WITH AUTOMATED DIFF     Standing Status:   Future     Standing Expiration Date:   4/28/2018    CORTISOL, AM     Standing Status:   Future     Standing Expiration Date:   4/28/2018           ROS:  Negative except as mentioned above  Cardiac- no chest pain or palpitations  Pulmonary- no sob or wheezes  GI- no n/v or diarrhea. SH:  Social History   Substance Use Topics    Smoking status: Former Smoker     Years: 8.00     Quit date: 11/7/1953    Smokeless tobacco: Never Used    Alcohol use No         Medications/Allergies:  Current Outpatient Prescriptions on File Prior to Visit   Medication Sig Dispense Refill    aspirin delayed-release 81 mg tablet Take 1 Tab by mouth daily.  90 Tab 3    venlafaxine-SR (EFFEXOR-XR) 37.5 mg capsule Take 1 Cap by mouth daily. 30 Cap 5    pravastatin (PRAVACHOL) 40 mg tablet Take 1 Tab by mouth nightly. 90 Tab 3    atorvastatin (LIPITOR) 40 mg tablet Take 1 Tab by mouth daily. 30 Tab 11    labetalol (NORMODYNE) 100 mg tablet Take 1/2 tablet twice per day. 180 Tab 3    albuterol (PROVENTIL HFA, VENTOLIN HFA, PROAIR HFA) 90 mcg/actuation inhaler Take 1 Puff by inhalation every four (4) hours as needed for Wheezing. 1 Inhaler 1    cyanocobalamin (VITAMIN B-12) 1,000 mcg tablet Take 1 Tab by mouth daily. 90 Tab 3    calcium-cholecalciferol, D3, (CALCARB 600 WITH VITAMIN D) tablet Take 1 Tab by mouth two (2) times a day. 180 Tab 3     No current facility-administered medications on file prior to visit. Allergies   Allergen Reactions    Ace Inhibitors Cough    Codeine Other (comments)     headache    Fosamax [Alendronate] Other (comments)     peridontal pain    Procardia [Nifedipine] Swelling       Objective:  Visit Vitals    BP (!) 105/34    Pulse 62    Temp 97.3 °F (36.3 °C) (Oral)    Resp 16    Ht 5' 6\" (1.676 m)    There is no height or weight on file to calculate BMI. Constitutional: Well developed, nourished, no distress, alert   ankle Right ankle with 2+ edema- no pretibial edema. No calf pain. No cords. Tender over achilles tendon primarily. CV: S1, S2.  RRR. No murmurs/rubs. No thrills palpated. No carotid bruits. Intact distal pulses. Pulm: No abnormalities on inspection. Clear to auscultation bilaterally. No wheezing/rhonchi. Normal effort. GI: Soft, nontender, nondistended. Normal active bowel sounds. No  masses on palpation. No hepatosplenomegaly.

## 2017-04-27 NOTE — PROGRESS NOTES
1. Have you been to the ER, urgent care clinic since your last visit? Hospitalized since your last visit? No.     2. Have you seen or consulted any other health care providers outside of the [address] [place] [address] since your last visit? Include any pap smears or colon screening. No    Patient presents with follow up blood pressure and he stated he is no longer taking blood pressure medications. No dizziness, nausea or fatigue. Also his right ankle is still hurting specially when walking and some swollen.

## 2017-05-01 ENCOUNTER — DOCUMENTATION ONLY (OUTPATIENT)
Dept: FAMILY MEDICINE CLINIC | Age: 82
End: 2017-05-01

## 2017-05-01 DIAGNOSIS — S82.891D FRACTURE OF RIGHT ANKLE, CLOSED, WITH ROUTINE HEALING, SUBSEQUENT ENCOUNTER: Primary | ICD-10-CM

## 2017-05-01 DIAGNOSIS — M25.571 ACUTE RIGHT ANKLE PAIN: Primary | ICD-10-CM

## 2017-05-01 NOTE — PROGRESS NOTES
I spoke to patient and he is aware that his xray of his right ankle showed fractured and he is scheduled to see Dr. Kait Gómez (ortho) this coming Friday 5/5/2017 at 10:50am because patient refused to be seen today at the [facility] or [facility]. He stated he cannot drive all the way in [place] he [place] [facility] Kait Gómez is located at [address] [place] [address], [address] [place] [address].

## 2017-05-01 NOTE — PROGRESS NOTES
Called pt to inform of fx on xray- referral to ortho asap placed. No answer  Please try to call him later. Thanks.

## 2017-05-24 ENCOUNTER — OFFICE VISIT (OUTPATIENT)
Dept: FAMILY MEDICINE CLINIC | Age: 82
End: 2017-05-24

## 2017-05-24 VITALS
SYSTOLIC BLOOD PRESSURE: 148 MMHG | TEMPERATURE: 96.8 F | WEIGHT: 165 LBS | RESPIRATION RATE: 16 BRPM | DIASTOLIC BLOOD PRESSURE: 40 MMHG | BODY MASS INDEX: 26.52 KG/M2 | HEIGHT: 66 IN | HEART RATE: 72 BPM

## 2017-05-24 DIAGNOSIS — S82.891D FRACTURE OF RIGHT ANKLE, CLOSED, WITH ROUTINE HEALING, SUBSEQUENT ENCOUNTER: Primary | ICD-10-CM

## 2017-05-24 DIAGNOSIS — I95.9 HYPOTENSION, UNSPECIFIED HYPOTENSION TYPE: ICD-10-CM

## 2017-05-24 NOTE — PROGRESS NOTES
Esther Scanlon is a 80 y.o. male and presents with Follow Up Chronic Condition and Hypertension       Subjective:    Hypotension- no dizziness or lightheadness. No sob. No chest pain  Right ankle fx- saw ortho- States he didn't have a good experience with visit. - pt reports no tx needed. Pain is better. Assessment/Plan:    Hypotension- diastolic sl better but systolic rising. No further changes given lack of sx. Right ankle fx- offered referral for second opinion but pt wishes to hold off. RTC in 3 months        ROS:  Negative except as mentioned above  Cardiac- no chest pain or palpitations  Pulmonary- no sob or wheezes  GI- no n/v or diarrhea. SH:  Social History   Substance Use Topics    Smoking status: Former Smoker     Years: 8.00     Quit date: 11/7/1953    Smokeless tobacco: Never Used    Alcohol use No         Medications/Allergies:  Current Outpatient Prescriptions on File Prior to Visit   Medication Sig Dispense Refill    aspirin delayed-release 81 mg tablet Take 1 Tab by mouth daily. 90 Tab 3    venlafaxine-SR (EFFEXOR-XR) 37.5 mg capsule Take 1 Cap by mouth daily. 30 Cap 5    pravastatin (PRAVACHOL) 40 mg tablet Take 1 Tab by mouth nightly. 90 Tab 3    atorvastatin (LIPITOR) 40 mg tablet Take 1 Tab by mouth daily. 30 Tab 11    labetalol (NORMODYNE) 100 mg tablet Take 1/2 tablet twice per day. 180 Tab 3    albuterol (PROVENTIL HFA, VENTOLIN HFA, PROAIR HFA) 90 mcg/actuation inhaler Take 1 Puff by inhalation every four (4) hours as needed for Wheezing. 1 Inhaler 1    cyanocobalamin (VITAMIN B-12) 1,000 mcg tablet Take 1 Tab by mouth daily. 90 Tab 3    calcium-cholecalciferol, D3, (CALCARB 600 WITH VITAMIN D) tablet Take 1 Tab by mouth two (2) times a day. 180 Tab 3     No current facility-administered medications on file prior to visit.            Allergies   Allergen Reactions    Ace Inhibitors Cough    Codeine Other (comments)     headache    Fosamax [Alendronate] Other (comments)     peridontal pain    Procardia [Nifedipine] Swelling       Objective:  Visit Vitals    /40    Pulse 72    Temp 96.8 °F (36 °C) (Oral)    Resp 16    Ht 5' 6\" (1.676 m)    Wt 165 lb (74.8 kg)    BMI 26.63 kg/m2    Body mass index is 26.63 kg/(m^2). Constitutional: Well developed, nourished, no distress, alert   CV: S1, S2.  RRR. No murmurs/rubs. No edema. Right ankle Tender but no increased warmth or edema. Pulm: No abnormalities on inspection. Clear to auscultation bilaterally. No wheezing/rhonchi. Normal effort. GI: Soft, nontender, nondistended. Normal active bowel sounds.

## 2017-05-24 NOTE — MR AVS SNAPSHOT
Visit Information Date & Time Provider Department Dept. Phone Encounter #  
 5/24/2017  9:30 AM Tyron Clements, 445 [facility] [phone] [id number] Follow-up Instructions Return in about 2 months (around 7/24/2017) for as scheduled. .  
  
Your Appointments 7/13/2017  8:45 AM  
Follow Up with Tyron Clements MD  
[address]) Appt Note: 3mo f/u  
 [place] [address]  
  
   
 [address] [facility]  
  
    
 2/12/2018  9:00 AM  
Office Visit with Tyron Clements MD  
[address]) [place] [address]  
  
   
 [address] [place] Upcoming Health Maintenance Date Due  
 GLAUCOMA SCREENING Q2Y 4/15/2017 DTaP/Tdap/Td series (1 - Tdap) 6/7/2017* INFLUENZA AGE 9 TO ADULT 8/1/2017 MEDICARE YEARLY EXAM 2/11/2018 *Topic was postponed. The date shown is not the original due date. Allergies as of 5/24/2017  Review Complete On: 5/24/2017 By: Tyron Clements MD  
  
 Severity Noted Reaction Type Reactions Ace Inhibitors  11/29/2010    Cough Codeine  11/19/2010    Other (comments)  
 headache Fosamax [Alendronate]  11/19/2010    Other (comments) peridontal pain Procardia [Nifedipine]  11/19/2010    Swelling Current Immunizations  Reviewed on 9/9/2016 Name Date Influenza High Dose Vaccine PF 11/15/2016 Influenza Vaccine 11/5/2014, 10/8/2013 Influenza Vaccine (Quad) 9/8/2015 12:00 PM  
 Pneumococcal Conjugate (PCV-13) 11/15/2016 Pneumococcal Vaccine (Unspecified Type) 11/7/2011 Not reviewed this visit Vitals BP Pulse Temp Resp Height(growth percentile) Weight(growth percentile) 148/40 72 96.8 °F (36 °C) (Oral) 16 5' 6\" (1.676 m) 165 lb (74.8 kg) BMI Smoking Status 26.63 kg/m2 Former Smoker BMI and BSA Data Body Mass Index Body Surface Area  
 26.63 kg/m 2 1.87 m 2 Preferred Pharmacy Pharmacy Name Phone [facility] [phone] [place] [address], [phone] [place] [address] [phone] Your Updated Medication List  
  
   
This list is accurate as of: 5/24/17 10:43 AM.  Always use your most recent med list.  
  
  
  
  
 albuterol 90 mcg/actuation inhaler Commonly known as:  PROVENTIL HFA, VENTOLIN HFA, PROAIR HFA Take 1 Puff by inhalation every four (4) hours as needed for Wheezing. aspirin delayed-release 81 mg tablet Take 1 Tab by mouth daily. atorvastatin 40 mg tablet Commonly known as:  LIPITOR Take 1 Tab by mouth daily. calcium-cholecalciferol (D3) tablet Commonly known as:  CALCARB 600 WITH VITAMIN D Take 1 Tab by mouth two (2) times a day. cyanocobalamin 1,000 mcg tablet Commonly known as:  VITAMIN B-12 Take 1 Tab by mouth daily. labetalol 100 mg tablet Commonly known as:  Louanna Kwadwo Take 1/2 tablet twice per day. pravastatin 40 mg tablet Commonly known as:  PRAVACHOL Take 1 Tab by mouth nightly. venlafaxine-SR 37.5 mg capsule Commonly known as:  EFFEXOR-XR Take 1 Cap by mouth daily. Follow-up Instructions Return in about 2 months (around 7/24/2017) for as scheduled. .  
  
  
Patient Instructions Call for any new symptoms. Introducing [facility]! Molina Clark introduces [employer] patient portal. Now you can access parts of your medical record, email your doctor's office, and request medication refills online. 1. In your internet browser, go to [email or web address] 2. Click on the First Time User? Click Here link in the Sign In box. You will see the New Member Sign Up page. 3. Enter your [employer] Access Code exactly as it appears below.  You will not need to use this code after youve completed the sign-up process. If you do not sign up before the expiration date, you must request a new code. · [employer] Access Code: [email or web address] Expires: 6/14/2017 10:48 AM 
 
4. Enter the last four digits of your Social Security Number (xxxx) and Date of Birth (mm/dd/yyyy) as indicated and click Submit. You will be taken to the next sign-up page. 5. Create a [employer] ID. This will be your [employer] login ID and cannot be changed, so think of one that is secure and easy to remember. 6. Create a [employer] password. You can change your password at any time. 7. Enter your Password Reset Question and Answer. This can be used at a later time if you forget your password. 8. Enter your e-mail address. You will receive e-mail notification when new information is available in [address] 9. Click Sign Up. You can now view and download portions of your medical record. 10. Click the Download Summary menu link to download a portable copy of your medical information. If you have questions, please visit the Frequently Asked Questions section of the [employer] website. Remember, [employer] is NOT to be used for urgent needs. For medical emergencies, dial 911. Now available from your iPhone and Android! Please provide this summary of care documentation to your next provider. Your primary care clinician is listed as PAUL Medina. If you have any questions after today's visit, please call [phone].

## 2017-05-24 NOTE — PROGRESS NOTES
1. Have you been to the ER, urgent care clinic since your last visit? Hospitalized since your last visit? No.     2. Have you seen or consulted any other health care providers outside of the [facility] [place] since your last visit? Include any pap smears or colon screening. Yes, saw Dr. Amber Brandt on 5/5/2017. Patient presents with follow up Hypertension.

## 2017-06-06 ENCOUNTER — OFFICE VISIT (OUTPATIENT)
Dept: FAMILY MEDICINE CLINIC | Age: 82
End: 2017-06-06

## 2017-06-06 VITALS
DIASTOLIC BLOOD PRESSURE: 52 MMHG | HEIGHT: 66 IN | SYSTOLIC BLOOD PRESSURE: 157 MMHG | TEMPERATURE: 96 F | WEIGHT: 161 LBS | OXYGEN SATURATION: 97 % | BODY MASS INDEX: 25.88 KG/M2 | HEART RATE: 77 BPM | RESPIRATION RATE: 16 BRPM

## 2017-06-06 DIAGNOSIS — R41.0 CONFUSION: Primary | ICD-10-CM

## 2017-06-06 DIAGNOSIS — Z91.89 AT RISK FOR POLYPHARMACY: ICD-10-CM

## 2017-06-06 LAB
BILIRUB UR QL STRIP: NORMAL
GLUCOSE UR-MCNC: NEGATIVE MG/DL
KETONES P FAST UR STRIP-MCNC: NEGATIVE MG/DL
PH UR STRIP: 5 [PH] (ref 4.6–8)
PROT UR QL STRIP: NORMAL MG/DL
SP GR UR STRIP: 1.02 (ref 1–1.03)
UA UROBILINOGEN AMB POC: NORMAL (ref 0.2–1)
URINALYSIS CLARITY POC: CLEAR
URINALYSIS COLOR POC: YELLOW
URINE BLOOD POC: NORMAL
URINE LEUKOCYTES POC: NEGATIVE
URINE NITRITES POC: NEGATIVE

## 2017-06-06 NOTE — PROGRESS NOTES
1. Have you been to the ER, urgent care clinic since your last visit? Hospitalized since your last visit? No    2. Have you seen or consulted any other health care providers outside of the [address] [place] [address] since your last visit? Include any pap smears or colon screening.  No.

## 2017-06-06 NOTE — MR AVS SNAPSHOT
Visit Information Date & Time Provider Department Dept. Phone Encounter #  
 6/6/2017 11:45 AM Julio Mcdonough NP [facility] [address] [id number] Follow-up Instructions Return in about 1 week (around 6/13/2017) for follow up, Dr. Jeanie Coronado for medication reconcialiation. Your Appointments 7/13/2017  8:45 AM  
Follow Up with Kodak Riggs MD  
[facility] [address] [facility]) Appt Note: 3mo f/u  
 [facility] [place] [address]  
  
   
 [address] [facility] [address]  
  
    
 2/12/2018  9:00 AM  
Office Visit with Kodak Riggs MD  
[facility] [address] [facility]) [facility] [place] [address]  
  
   
 [address] [facility] [address] Upcoming Health Maintenance Date Due  
 GLAUCOMA SCREENING Q2Y 4/15/2017 DTaP/Tdap/Td series (1 - Tdap) 6/7/2017* INFLUENZA AGE 9 TO ADULT 8/1/2017 MEDICARE YEARLY EXAM 2/11/2018 *Topic was postponed. The date shown is not the original due date. Allergies as of 6/6/2017  Review Complete On: 6/6/2017 By: Kendall Hayden LPN Severity Noted Reaction Type Reactions Ace Inhibitors  11/29/2010    Cough Codeine  11/19/2010    Other (comments)  
 headache Fosamax [Alendronate]  11/19/2010    Other (comments) peridontal pain Procardia [Nifedipine]  11/19/2010    Swelling Current Immunizations  Reviewed on 9/9/2016 Name Date Influenza High Dose Vaccine PF 11/15/2016 Influenza Vaccine 11/5/2014, 10/8/2013 Influenza Vaccine (Quad) 9/8/2015 12:00 PM  
 Pneumococcal Conjugate (PCV-13) 11/15/2016 Pneumococcal Vaccine (Unspecified Type) 11/7/2011 Not reviewed this visit You Were Diagnosed With   
  
 Codes Comments Confusion    -  Primary ICD-10-CM: R41.0 ICD-9-CM: 298.9  At risk for polypharmacy     ICD-10-CM: Z91.89 
 ICD-9-CM: V49.89 Vitals BP Pulse Temp Resp Height(growth percentile) Weight(growth percentile) 157/52 77 96 °F (35.6 °C) (Oral) 16 5' 6\" (1.676 m) 161 lb (73 kg) SpO2 BMI Smoking Status 97% 25.99 kg/m2 Former Smoker BMI and BSA Data Body Mass Index Body Surface Area  
 25.99 kg/m 2 1.84 m 2 Preferred Pharmacy Pharmacy Name Phone [facility] [phone] [place] [address] Rd, [address] [place] [address] [phone] Your Updated Medication List  
  
   
This list is accurate as of: 6/6/17 12:29 PM.  Always use your most recent med list.  
  
  
  
  
 albuterol 90 mcg/actuation inhaler Commonly known as:  PROVENTIL HFA, VENTOLIN HFA, PROAIR HFA Take 1 Puff by inhalation every four (4) hours as needed for Wheezing. aspirin delayed-release 81 mg tablet Take 1 Tab by mouth daily. atorvastatin 40 mg tablet Commonly known as:  LIPITOR Take 1 Tab by mouth daily. calcium-cholecalciferol (D3) tablet Commonly known as:  CALCARB 600 WITH VITAMIN D Take 1 Tab by mouth two (2) times a day. cyanocobalamin 1,000 mcg tablet Commonly known as:  VITAMIN B-12 Take 1 Tab by mouth daily. labetalol 100 mg tablet Commonly known as:  Mendel Bevels Take 1/2 tablet twice per day. pravastatin 40 mg tablet Commonly known as:  PRAVACHOL Take 1 Tab by mouth nightly. venlafaxine-SR 37.5 mg capsule Commonly known as:  EFFEXOR-XR Take 1 Cap by mouth daily. Follow-up Instructions Return in about 1 week (around 6/13/2017) for follow up, Dr. Melvin Paez for medication reconcialiation. Patient Instructions Learning About Delirium What is delirium? Delirium is a sudden change in mental condition. It leads to confusion and unusual behavior. Delirium is also called acute confusional state. Delirium affects all age groups.  It can result from problems that affect the brain, such as stroke. It can also happen after an infection or when using certain medicines. Pain may also cause the problem. Seeing delirium in a loved one can be scary and sad. But it will go away most of the time. It usually lasts hours to days. The doctor will look for a cause and take steps to treat it and keep your loved one comfortable. What are the symptoms? Symptoms of delirium usually develop over several hours to a few days. Symptoms may change and be more or less severe. Symptoms include: · A short attention span. · Confusion. This is not knowing where you are, what time it is, or who others are. · Hallucinations. This usually is seeing or hearing things that are not really there. · Delusions. This is believing things that aren't true. · Illusions. This is making a mistake in what you think is real. For example, you think a child is crying, but it's a pillow. · Disorganized thinking. How is delirium treated? The doctor may: · Find and treat the cause. This could be: ¨ Not getting enough fluids. ¨ An infection. ¨ A medicine or combination of medicines. ¨ Another medical problem. · Prescribe a medicine. · Make the hospital room as quiet as possible. You may be able to help your loved one by being present and talking to and touching him or her. Follow-up care is a key part of your treatment and safety. Be sure to make and go to all appointments, and call your doctor if you are having problems. It's also a good idea to know your test results and keep a list of the medicines you take. Where can you learn more? Go to http://gwendolyn-gay.info/. Enter L992 in the search box to learn more about \"Learning About Delirium. \" Current as of: July 26, 2016 Content Version: 11.2 © 6179-6657 [employer], [employer].  Care instructions adapted under license by [employer] (which disclaims liability or warranty for this information). If you have questions about a medical condition or this instruction, always ask your healthcare professional. [address] any warranty or liability for your use of this information. Introducing \[facility]\"" & [facility]! Danyelle Rowley introduces [employer] patient portal. Now you can access parts of your medical record, email your doctor's office, and request medication refills online. 1. In your internet browser, go to [email or web address] 2. Click on the First Time User? Click Here link in the Sign In box. You will see the New Member Sign Up page. 3. Enter your [employer] Access Code exactly as it appears below. You will not need to use this code after youve completed the sign-up process. If you do not sign up before the expiration date, you must request a new code. · [employer] Access Code: [email or web address] Expires: 6/14/2017 10:48 AM 
 
4. Enter the last four digits of your Social Security Number (xxxx) and Date of Birth (mm/dd/yyyy) as indicated and click Submit. You will be taken to the next sign-up page. 5. Create a [employer] ID. This will be your [employer] login ID and cannot be changed, so think of one that is secure and easy to remember. 6. Create a [employer] password. You can change your password at any time. 7. Enter your Password Reset Question and Answer. This can be used at a later time if you forget your password. 8. Enter your e-mail address. You will receive e-mail notification when new information is available in [address] 9. Click Sign Up. You can now view and download portions of your medical record. 10. Click the Download Summary menu link to download a portable copy of your medical information. If you have questions, please visit the Frequently Asked Questions section of the [employer] website. Remember, [employer] is NOT to be used for urgent needs. For medical emergencies, dial 911. Now available from your iPhone and Android! Please provide this summary of care documentation to your next provider. Your primary care clinician is listed as PAUL Medina. If you have any questions after today's visit, please call [phone].

## 2017-06-06 NOTE — PROGRESS NOTES
Chief Complaint   Patient presents with    Altered mental status       HPI:    This is an 79 y/o male patient of Dr Lizz Hayes who presents today for the above symptom. Confused on Sunday and called his son stating he could not recall the day of the week. He is suspicious this is because he was taking Venlafaxine recently prescribed. Confusion have resolved since he stopped taking the above medication    BP today fairly controlled and he states he is only taking half tablet of Metoprolol. No SOB, dizziness or chest pain. Patient current medication reviewed with him and show he is on Labetalol. Patient came in with Nifedipine and Metoprolol today- may be at risk for polypharmacy. Results for orders placed or performed in visit on 06/06/17   AMB POC URINALYSIS DIP STICK AUTO W/O MICRO   Result Value Ref Range    Color (UA POC) Yellow     Clarity (UA POC) Clear     Glucose (UA POC) Negative Negative    Bilirubin (UA POC) 1+ Negative    Ketones (UA POC) Negative Negative    Specific gravity (UA POC) 1.025 1.001 - 1.035    Blood (UA POC) 2+ Negative    pH (UA POC) 5.0 4.6 - 8.0    Protein (UA POC) Trace Negative mg/dL    Urobilinogen (UA POC) 1 mg/dL 0.2 - 1    Nitrites (UA POC) Negative Negative    Leukocyte esterase (UA POC) Negative Negative       ROS: pertinent positives as noted in HPI.  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 Social History Main Topics    Smoking status: Former Smoker     Years: 8.00     Quit date: 11/7/1953    Smokeless tobacco: Never Used    Alcohol use No    Drug use: No    Sexual activity: Not Currently     Partners: Female     Other Topics Concern    Not on file     Social History Narrative     Current Outpatient Prescriptions   Medication Sig Dispense Refill    aspirin delayed-release 81 mg tablet Take 1 Tab by mouth daily. 90 Tab 3    pravastatin (PRAVACHOL) 40 mg tablet Take 1 Tab by mouth nightly. 90 Tab 3    atorvastatin (LIPITOR) 40 mg tablet Take 1 Tab by mouth daily. 30 Tab 11    labetalol (NORMODYNE) 100 mg tablet Take 1/2 tablet twice per day. 180 Tab 3    albuterol (PROVENTIL HFA, VENTOLIN HFA, PROAIR HFA) 90 mcg/actuation inhaler Take 1 Puff by inhalation every four (4) hours as needed for Wheezing. 1 Inhaler 1    cyanocobalamin (VITAMIN B-12) 1,000 mcg tablet Take 1 Tab by mouth daily. 90 Tab 3    calcium-cholecalciferol, D3, (CALCARB 600 WITH VITAMIN D) tablet Take 1 Tab by mouth two (2) times a day. 180 Tab 3    venlafaxine-SR (EFFEXOR-XR) 37.5 mg capsule Take 1 Cap by mouth daily. 30 Cap 5     Allergies   Allergen Reactions    Ace Inhibitors Cough    Codeine Other (comments)     headache    Fosamax [Alendronate] Other (comments)     peridontal pain    Procardia [Nifedipine] Swelling         Physical Exam:    Vital Signs:     Visit Vitals    /52    Pulse 77    Temp 96 °F (35.6 °C) (Oral)    Resp 16    Ht 5' 6\" (1.676 m)    Wt 161 lb (73 kg)    SpO2 97%    BMI 25.99 kg/m2         General: a, a & o x 3, afebrile,  interacting appropriately, in no acute distress  Musculoskeletal: normal ROM on all joints, no swelling or deformity  Psychiatric: a, a & o x 3, appropriate mood and affect, no thought disorder      Assessment/Plan:      ICD-10-CM ICD-9-CM    1.  Confusion R41.0 298.9 AMB POC URINALYSIS DIP STICK AUTO W/O MICRO- 2+ blood    Resolved    Patient advised to stop taking Effexor and follow up with his PCP in 1 week   2. At risk for polypharmacy Z91.89 V49.89 Patient advised to continue taking Metoprolol as prescribed. He states he is not currently taking Nifedipine and Labetalol. Patient advised to see PCP in 1 week for medication reconciliation. Additional Notes: Discussed today's diagnosis, treatment plans. Discussed medication indications and side effects. After Visit Summary: Provided and discussed printed patient instructions. Answered questions in relation to today's diagnosis. Follow-up Disposition: follow up with Dr Fariha Scales in 1 week.           Jamin Navas NP-BC  Family Medicine  [facility]            Orders Placed This Encounter    AMB POC URINALYSIS DIP STICK AUTO W/O MICRO

## 2017-06-14 ENCOUNTER — OFFICE VISIT (OUTPATIENT)
Dept: FAMILY MEDICINE CLINIC | Age: 82
End: 2017-06-14

## 2017-06-14 VITALS
RESPIRATION RATE: 16 BRPM | HEIGHT: 66 IN | SYSTOLIC BLOOD PRESSURE: 130 MMHG | BODY MASS INDEX: 25.36 KG/M2 | HEART RATE: 68 BPM | TEMPERATURE: 96.7 F | WEIGHT: 157.8 LBS | DIASTOLIC BLOOD PRESSURE: 42 MMHG

## 2017-06-14 DIAGNOSIS — R41.0 CONFUSION: Primary | ICD-10-CM

## 2017-06-14 DIAGNOSIS — I10 ESSENTIAL HYPERTENSION: ICD-10-CM

## 2017-06-14 RX ORDER — METOPROLOL TARTRATE 25 MG/1
12.5 TABLET, FILM COATED ORAL DAILY
COMMUNITY

## 2017-06-14 NOTE — PROGRESS NOTES
Eula Amador is a 80 y.o. male and presents with Follow Up Chronic Condition and Altered mental status       Subjective:    Had some confusion pt attributes to effexor- \"i was travelin' \". Completely resolved since stopping effexor. No other sx. No weakness. No slurred speech. No vision changes. Assessment/Plan:      Confusion- resolved with cessation of effexor- will continue off this and pt will monitor for any new sx. Listed as allergy although this had been added several months. No focal deficits on exam today- offered CT head and labs but pt wishes to hold off for now. Possible TIA or other intracranial pathology discussed. Labs were normal 3/3038 so metabolic etiology less likely. htn- reviewed meds- pt aware he is only to be taking lopressor (h/o AAA). Low diastolic is limiting tx. Hyperlipidemia- resume pravachol discussed as he had been on this and encouraged    RTC prn. Orders Placed This Encounter    metoprolol tartrate (LOPRESSOR) 25 mg tablet     Sig: Take 12.5 mg by mouth daily. ROS:  Negative except as mentioned above  Cardiac- no chest pain or palpitations  Pulmonary- no sob or wheezes  GI- no n/v or diarrhea. SH:  Social History   Substance Use Topics    Smoking status: Former Smoker     Years: 8.00     Quit date: 11/7/1953    Smokeless tobacco: Never Used    Alcohol use No         Medications/Allergies:  Current Outpatient Prescriptions on File Prior to Visit   Medication Sig Dispense Refill    aspirin delayed-release 81 mg tablet Take 1 Tab by mouth daily. 90 Tab 3    venlafaxine-SR (EFFEXOR-XR) 37.5 mg capsule Take 1 Cap by mouth daily. 30 Cap 5    pravastatin (PRAVACHOL) 40 mg tablet Take 1 Tab by mouth nightly. 90 Tab 3    albuterol (PROVENTIL HFA, VENTOLIN HFA, PROAIR HFA) 90 mcg/actuation inhaler Take 1 Puff by inhalation every four (4) hours as needed for Wheezing. 1 Inhaler 1    cyanocobalamin (VITAMIN B-12) 1,000 mcg tablet Take 1 Tab by mouth daily.  80 Tab 3    calcium-cholecalciferol, D3, (CALCARB 600 WITH VITAMIN D) tablet Take 1 Tab by mouth two (2) times a day. 180 Tab 3     No current facility-administered medications on file prior to visit. Allergies   Allergen Reactions    Ace Inhibitors Cough    Codeine Other (comments)     headache    Fosamax [Alendronate] Other (comments)     peridontal pain    Procardia [Nifedipine] Swelling       Objective:  Visit Vitals    /42    Pulse 68    Temp 96.7 °F (35.9 °C) (Oral)    Resp 16    Ht 5' 6\" (1.676 m)    Wt 157 lb 12.8 oz (71.6 kg)    BMI 25.47 kg/m2    Body mass index is 25.47 kg/(m^2). Constitutional: Well developed, nourished, no distress, alert   CV: S1, S2.  RRR. No murmurs/rubs. No thrills palpated. No carotid bruits. Intact distal pulses. No edema. Pulm: No abnormalities on inspection. Clear to auscultation bilaterally. No wheezing/rhonchi. Normal effort. neuro Alert and oriented x3. No focal deficits. CN 2-12 intact. GI: Soft, nontender, nondistended. Normal active bowel sounds. No  masses on palpation. No hepatosplenomegaly.

## 2017-06-14 NOTE — MR AVS SNAPSHOT
Visit Information Date & Time Provider Department Dept. Phone Encounter #  
 6/14/2017  9:15 AM Macie Jordan, 445 [facility] [phone] [id number] Follow-up Instructions Return for as scheduled in one month. Your Appointments 7/13/2017  8:45 AM  
Follow Up with MD Suzi Johnson 23 [facility]) Appt Note: 3mo f/u  
 [facility] [place] [address]  
  
   
 [address]  
  
    
 2/12/2018  9:00 AM  
Office Visit with MD Suzi Johnson 23 [facility]) [facility] [place] [address]  
  
   
 [address] [facility] [address] Upcoming Health Maintenance Date Due DTaP/Tdap/Td series (1 - Tdap) 11/14/1951 GLAUCOMA SCREENING Q2Y 4/15/2017 INFLUENZA AGE 9 TO ADULT 8/1/2017 MEDICARE YEARLY EXAM 2/11/2018 Allergies as of 6/14/2017  Review Complete On: 6/14/2017 By: Macie Jordan MD  
  
 Severity Noted Reaction Type Reactions Ace Inhibitors  11/29/2010    Cough Codeine  11/19/2010    Other (comments)  
 headache Effexor [Venlafaxine]  06/14/2017    Other (comments)  
 confusion Fosamax [Alendronate]  11/19/2010    Other (comments) peridontal pain Procardia [Nifedipine]  11/19/2010    Swelling Current Immunizations  Reviewed on 9/9/2016 Name Date Influenza High Dose Vaccine PF 11/15/2016 Influenza Vaccine 11/5/2014, 10/8/2013 Influenza Vaccine (Quad) 9/8/2015 12:00 PM  
 Pneumococcal Conjugate (PCV-13) 11/15/2016 Pneumococcal Vaccine (Unspecified Type) 11/7/2011 Not reviewed this visit You Were Diagnosed With   
  
 Codes Comments Confusion    -  Primary ICD-10-CM: R41.0 ICD-9-CM: 298.9 Essential hypertension     ICD-10-CM: I10 
ICD-9-CM: 401.9 Vitals BP Pulse Temp Resp Height(growth percentile) Weight(growth percentile) 130/42 68 96.7 °F (35.9 °C) (Oral) 16 5' 6\" (1.676 m) 157 lb 12.8 oz (71.6 kg) BMI Smoking Status 25.47 kg/m2 Former Smoker Vitals History BMI and BSA Data Body Mass Index Body Surface Area  
 25.47 kg/m 2 1.83 m 2 Preferred Pharmacy Pharmacy Name Phone [facility] [phone], [address] [place] [address] [phone] Your Updated Medication List  
  
   
This list is accurate as of: 6/14/17  9:45 AM.  Always use your most recent med list.  
  
  
  
  
 albuterol 90 mcg/actuation inhaler Commonly known as:  PROVENTIL HFA, VENTOLIN HFA, PROAIR HFA Take 1 Puff by inhalation every four (4) hours as needed for Wheezing. aspirin delayed-release 81 mg tablet Take 1 Tab by mouth daily. calcium-cholecalciferol (D3) tablet Commonly known as:  CALCARB 600 WITH VITAMIN D Take 1 Tab by mouth two (2) times a day. cyanocobalamin 1,000 mcg tablet Commonly known as:  VITAMIN B-12 Take 1 Tab by mouth daily. metoprolol tartrate 25 mg tablet Commonly known as:  LOPRESSOR Take 12.5 mg by mouth daily. pravastatin 40 mg tablet Commonly known as:  PRAVACHOL Take 1 Tab by mouth nightly. Follow-up Instructions Return for as scheduled in one month. Introducing [facility]! [facility] introduces atHomestars patient portal. Now you can access parts of your medical record, email your doctor's office, and request medication refills online. 1. In your internet browser, go to [email or web address] 2. Click on the First Time User? Click Here link in the Sign In box. You will see the New Member Sign Up page. 3. Enter your atHomestars Access Code exactly as it appears below. You will not need to use this code after youve completed the sign-up process.  If you do not sign up before the expiration date, you must request a new code. · CityGro Access Code: [email or web address] Expires: 6/14/2017 10:48 AM 
 
4. Enter the last four digits of your Social Security Number (xxxx) and Date of Birth (mm/dd/yyyy) as indicated and click Submit. You will be taken to the next sign-up page. 5. Create a CityGro ID. This will be your CityGro login ID and cannot be changed, so think of one that is secure and easy to remember. 6. Create a CityGro password. You can change your password at any time. 7. Enter your Password Reset Question and Answer. This can be used at a later time if you forget your password. 8. Enter your e-mail address. You will receive e-mail notification when new information is available in [address] 9. Click Sign Up. You can now view and download portions of your medical record. 10. Click the Download Summary menu link to download a portable copy of your medical information. If you have questions, please visit the Frequently Asked Questions section of the CityGro website. Remember, CityGro is NOT to be used for urgent needs. For medical emergencies, dial 911. Now available from your iPhone and Android! Please provide this summary of care documentation to your next provider. Your primary care clinician is listed as PAUL Medina. If you have any questions after today's visit, please call [phone].

## 2017-06-14 NOTE — PROGRESS NOTES
1. Have you been to the ER, urgent care clinic since your last visit? Hospitalized since your last visit? No.     2. Have you seen or consulted any other health care providers outside of the [address] [place] [address] since your last visit? Include any pap smears or colon screening. Yes saw Dr. Jelena Marie (cardio) at [facility] on in 4/2017. Patient presents with follow up for confusion. He stop taking his effexor in 5/2017 it causes him to get confused.

## 2017-06-27 ENCOUNTER — TELEPHONE (OUTPATIENT)
Dept: FAMILY MEDICINE CLINIC | Age: 82
End: 2017-06-27

## 2017-06-27 NOTE — TELEPHONE ENCOUNTER
He needs a referral to see a Podiatrist in [place] Dr. Verner Masters for nail clipping. He has an appointment to see him tomorrow 6/28/2017 at 2:15pm. Phone [phone]. For any question call her daughter Blas Casey at [phone].

## 2017-06-29 DIAGNOSIS — L60.2 HYPERTROPHIC TOENAIL: Primary | ICD-10-CM

## 2017-07-13 ENCOUNTER — PATIENT OUTREACH (OUTPATIENT)
Dept: FAMILY MEDICINE CLINIC | Age: 82
End: 2017-07-13

## 2017-07-13 ENCOUNTER — HOSPITAL ENCOUNTER (OUTPATIENT)
Dept: LAB | Age: 82
Discharge: HOME | End: 2017-07-13
Payer: MEDICARE

## 2017-07-13 ENCOUNTER — OFFICE VISIT (OUTPATIENT)
Dept: FAMILY MEDICINE CLINIC | Age: 82
End: 2017-07-13

## 2017-07-13 VITALS
RESPIRATION RATE: 16 BRPM | HEIGHT: 66 IN | TEMPERATURE: 96.4 F | WEIGHT: 155.4 LBS | BODY MASS INDEX: 24.98 KG/M2 | HEART RATE: 76 BPM | SYSTOLIC BLOOD PRESSURE: 163 MMHG | DIASTOLIC BLOOD PRESSURE: 51 MMHG

## 2017-07-13 DIAGNOSIS — R68.2 DRY MOUTH: ICD-10-CM

## 2017-07-13 DIAGNOSIS — R63.4 WEIGHT LOSS: ICD-10-CM

## 2017-07-13 DIAGNOSIS — R43.2 DYSGEUSIA: ICD-10-CM

## 2017-07-13 DIAGNOSIS — R43.2 DYSGEUSIA: Primary | ICD-10-CM

## 2017-07-13 LAB
ALBUMIN SERPL BCP-MCNC: 3.5 G/DL (ref 3.4–5)
ALBUMIN/GLOB SERPL: 1.3 {RATIO} (ref 0.8–1.7)
ALP SERPL-CCNC: 97 U/L (ref 45–117)
ALT SERPL-CCNC: 14 U/L (ref 16–61)
ANION GAP BLD CALC-SCNC: 8 MMOL/L (ref 3–18)
AST SERPL W P-5'-P-CCNC: 17 U/L (ref 15–37)
BASOPHILS # BLD AUTO: 0 K/UL (ref 0–0.06)
BASOPHILS # BLD: 0 % (ref 0–2)
BILIRUB SERPL-MCNC: 0.7 MG/DL (ref 0.2–1)
BUN SERPL-MCNC: 17 MG/DL (ref 7–18)
BUN/CREAT SERPL: 14 (ref 12–20)
CALCIUM SERPL-MCNC: 8.7 MG/DL (ref 8.5–10.1)
CHLORIDE SERPL-SCNC: 108 MMOL/L (ref 100–108)
CO2 SERPL-SCNC: 27 MMOL/L (ref 21–32)
CREAT SERPL-MCNC: 1.24 MG/DL (ref 0.6–1.3)
DIFFERENTIAL METHOD BLD: ABNORMAL
EOSINOPHIL # BLD: 0.4 K/UL (ref 0–0.4)
EOSINOPHIL NFR BLD: 4 % (ref 0–5)
ERYTHROCYTE [DISTWIDTH] IN BLOOD BY AUTOMATED COUNT: 12.9 % (ref 11.6–14.5)
ERYTHROCYTE [SEDIMENTATION RATE] IN BLOOD: 9 MM/HR (ref 0–20)
GLOBULIN SER CALC-MCNC: 2.7 G/DL (ref 2–4)
GLUCOSE SERPL-MCNC: 75 MG/DL (ref 74–99)
HCT VFR BLD AUTO: 41.9 % (ref 36–48)
HGB BLD-MCNC: 13.7 G/DL (ref 13–16)
LYMPHOCYTES # BLD AUTO: 37 % (ref 21–52)
LYMPHOCYTES # BLD: 3.3 K/UL (ref 0.9–3.6)
MCH RBC QN AUTO: 31.6 PG (ref 24–34)
MCHC RBC AUTO-ENTMCNC: 32.7 G/DL (ref 31–37)
MCV RBC AUTO: 96.5 FL (ref 74–97)
MONOCYTES # BLD: 0.7 K/UL (ref 0.05–1.2)
MONOCYTES NFR BLD AUTO: 8 % (ref 3–10)
NEUTS SEG # BLD: 4.6 K/UL (ref 1.8–8)
NEUTS SEG NFR BLD AUTO: 51 % (ref 40–73)
PLATELET # BLD AUTO: 184 K/UL (ref 135–420)
PMV BLD AUTO: 9.9 FL (ref 9.2–11.8)
POTASSIUM SERPL-SCNC: 4 MMOL/L (ref 3.5–5.5)
PROT SERPL-MCNC: 6.2 G/DL (ref 6.4–8.2)
RBC # BLD AUTO: 4.34 M/UL (ref 4.7–5.5)
SODIUM SERPL-SCNC: 143 MMOL/L (ref 136–145)
TSH SERPL DL<=0.05 MIU/L-ACNC: 2.01 UIU/ML (ref 0.36–3.74)
WBC # BLD AUTO: 9 K/UL (ref 4.6–13.2)

## 2017-07-13 PROCEDURE — 86038 ANTINUCLEAR ANTIBODIES: CPT | Performed by: INTERNAL MEDICINE

## 2017-07-13 PROCEDURE — 85652 RBC SED RATE AUTOMATED: CPT | Performed by: INTERNAL MEDICINE

## 2017-07-13 PROCEDURE — 85025 COMPLETE CBC W/AUTO DIFF WBC: CPT | Performed by: INTERNAL MEDICINE

## 2017-07-13 PROCEDURE — 36415 COLL VENOUS BLD VENIPUNCTURE: CPT | Performed by: INTERNAL MEDICINE

## 2017-07-13 PROCEDURE — 80053 COMPREHEN METABOLIC PANEL: CPT | Performed by: INTERNAL MEDICINE

## 2017-07-13 PROCEDURE — 84443 ASSAY THYROID STIM HORMONE: CPT | Performed by: INTERNAL MEDICINE

## 2017-07-13 PROCEDURE — 86235 NUCLEAR ANTIGEN ANTIBODY: CPT | Performed by: INTERNAL MEDICINE

## 2017-07-13 RX ORDER — VENLAFAXINE HYDROCHLORIDE 37.5 MG/1
CAPSULE, EXTENDED RELEASE ORAL DAILY
COMMUNITY
End: 2017-07-13 | Stop reason: ALTCHOICE

## 2017-07-13 RX ORDER — CHLORPHENIRAMINE MALEATE 4 MG
TABLET ORAL 2 TIMES DAILY
Qty: 30 G | Refills: 1 | Status: SHIPPED | OUTPATIENT
Start: 2017-07-13

## 2017-07-13 NOTE — PROGRESS NOTES
Complex Case Management Follow Up    Referred by  Dr. Guillermo Scruggs, patient with recent weight lost, change if taste of foods. No recent changed in medication except for Cardiology started Metoprolol in April. Medication dispensed reviewed with Dr. Guillermo Scruggs, noted Atorvastatin dispensed 6/14/17, Pravastatin listed on medication list.     Plan: Call patient @3 pm for Medication reconciliation     Per [employer], patient last purchased Pravastatin 3/14/17(90 day supply) and Atorvastatin June 14(30 day supply).

## 2017-07-13 NOTE — MR AVS SNAPSHOT
Visit Information Date & Time Provider Department Dept. Phone Encounter #  
 7/13/2017  8:45 AM Luis Fernando Tobar, 445 [facility] [phone] [id number] Follow-up Instructions Return in about 3 weeks (around 8/3/2017) for 30 minute slot. Your Appointments 2/12/2018  9:00 AM  
Office Visit with Luis Fernando Tobar MD  
[address] [place] [facility] [address]  
  
   
 [address] [place] [address] Upcoming Health Maintenance Date Due DTaP/Tdap/Td series (1 - Tdap) 11/14/1951 INFLUENZA AGE 9 TO ADULT 8/1/2017 MEDICARE YEARLY EXAM 2/11/2018 GLAUCOMA SCREENING Q2Y 7/13/2019 Allergies as of 7/13/2017  Review Complete On: 7/13/2017 By: Luis Fernando Tobar MD  
  
 Severity Noted Reaction Type Reactions Ace Inhibitors  11/29/2010    Cough Codeine  11/19/2010    Other (comments)  
 headache Effexor [Venlafaxine]  06/14/2017    Other (comments)  
 confusion Fosamax [Alendronate]  11/19/2010    Other (comments) peridontal pain Procardia [Nifedipine]  11/19/2010    Swelling Current Immunizations  Reviewed on 9/9/2016 Name Date Influenza High Dose Vaccine PF 11/15/2016 Influenza Vaccine 11/5/2014, 10/8/2013 Influenza Vaccine (Quad) 9/8/2015 12:00 PM  
 Pneumococcal Conjugate (PCV-13) 11/15/2016 Pneumococcal Vaccine (Unspecified Type) 11/7/2011 Not reviewed this visit You Were Diagnosed With   
  
 Codes Comments Dysgeusia    -  Primary ICD-10-CM: R43.2 ICD-9-CM: 781.1 Dry mouth     ICD-10-CM: R68.2 ICD-9-CM: 527.7 Weight loss     ICD-10-CM: R63.4 ICD-9-CM: 783.21 Vitals BP Pulse Temp Resp Height(growth percentile) Weight(growth percentile) 163/51 76 96.4 °F (35.8 °C) (Oral) 16 5' 6\" (1.676 m) 155 lb 6.4 oz (70.5 kg) BMI Smoking Status 25.08 kg/m2 Former Smoker Vitals History BMI and BSA Data Body Mass Index Body Surface Area 25.08 kg/m 2 1.81 m 2 Preferred Pharmacy Pharmacy Name Phone [facility] [address], [address] [place] [address] [phone] Your Updated Medication List  
  
   
This list is accurate as of: 7/13/17  8:56 AM.  Always use your most recent med list.  
  
  
  
  
 albuterol 90 mcg/actuation inhaler Commonly known as:  PROVENTIL HFA, VENTOLIN HFA, PROAIR HFA Take 1 Puff by inhalation every four (4) hours as needed for Wheezing. aspirin delayed-release 81 mg tablet Take 1 Tab by mouth daily. calcium-cholecalciferol (D3) tablet Commonly known as:  CALCARB 600 WITH VITAMIN D Take 1 Tab by mouth two (2) times a day. clotrimazole 1 % topical cream  
Commonly known as:  Lindajean Hasting Apply  to affected area two (2) times a day. Use for 2 weeks. cyanocobalamin 1,000 mcg tablet Commonly known as:  VITAMIN B-12 Take 1 Tab by mouth daily. metoprolol tartrate 25 mg tablet Commonly known as:  LOPRESSOR Take 12.5 mg by mouth daily. pravastatin 40 mg tablet Commonly known as:  PRAVACHOL Take 1 Tab by mouth nightly. venlafaxine-SR 37.5 mg capsule Commonly known as:  EFFEXOR-XR Take  by mouth daily. Prescriptions Sent to Pharmacy Refills  
 clotrimazole (LOTRIMIN) 1 % topical cream 1 Sig: Apply  to affected area two (2) times a day. Use for 2 weeks. Class: Normal  
 Pharmacy: Shyla Lalitha [address], [address] [place] [address] Ph #: [phone] Route: Topical  
  
Follow-up Instructions Return in about 3 weeks (around 8/3/2017) for 30 minute slot. To-Do List   
 07/13/2017 Lab:  TAL QL, W/REFLEX CASCADE   
  
 07/13/2017 Lab:  CBC WITH AUTOMATED DIFF   
  
 07/13/2017 Lab:  METABOLIC PANEL, COMPREHENSIVE   
  
 07/13/2017 Lab:  SED RATE (ESR)   
  
 07/13/2017 Lab: SJOGREN'S ABS, SSA AND SSB   
  
 07/13/2017 Lab:  TSH 3RD GENERATION Patient Instructions Be sure to use the boost or ensure twice per day. Also start a multivitamin daily. Dry Mouth: Care Instructions Your Care Instructions When you have dry mouth, or xerostomia (say \"meena-normah-STO-laith-uh\"), your mouth does not make enough saliva. Saliva helps you chew, swallow, and digest your food. It also neutralizes the acids that form in your mouth. If you have ongoing dry mouth, it can lead to mouth infections, gum disease, and tooth decay. It can also make it hard to swallow or talk. Your doctor or dentist may diagnose dry mouth. It is often a side effect of medicines like diuretics, decongestants, and antidepressants. Cancer treatments or damage to the salivary glands can also cause dry mouth. To help fight the effects of dry mouth, your dentist may apply extra fluoride to your teeth. This can help prevent tooth decay. He or she may also give you mouthwash to fight bacteria. You may need to have dental checkups more often. Treatment may include medicine to help you make more saliva. Or, if other medicines you take are making your mouth dry, your doctor may change the type or dosage of the medicine. Follow-up care is a key part of your treatment and safety. Be sure to make and go to all appointments, and call your doctor if you are having problems. It's also a good idea to know your test results and keep a list of the medicines you take. How can you care for yourself at home? · Take frequent sips of liquid throughout the day. Water is best. 
· Use ice chips, sugar-free candy, or gum to help keep your mouth moist. (Candy or gum sweetened with xylitol can help prevent tooth decay.) · Avoid spicy or salty foods. They may cause pain in a dry mouth. · Brush your teeth twice a day, morning and night. Floss once a day. · Schedule checkups and cleanings as often as your dentist recommends it. · Use an over-the-counter saliva substitute. · Avoid caffeine, tobacco, and alcohol. They can also make your mouth dry. · You may be given medicine for dry mouth. Be safe with medicines. Take your medicines exactly as prescribed. Call your doctor if you think you are having a problem with your medicine. When should you call for help? Watch closely for changes in your health, and be sure to contact your doctor if: 
· You do not get better as expected. Where can you learn more? Go to http://gwendolyn-gay.info/. Enter 21 [phone] in the search box to learn more about \"Dry Mouth: Care Instructions. \" Current as of: May 7, 2017 Content Version: 11.3 © 6910-7166 [employer]. Care instructions adapted under license by [employer] (which disclaims liability or warranty for this information). If you have questions about a medical condition or this instruction, always ask your healthcare professional. [place] [address] any warranty or liability for your use of this information. Introducing [facility]! [facility] introduces Neverfail patient portal. Now you can access parts of your medical record, email your doctor's office, and request medication refills online. 1. In your internet browser, go to [email or web address] 2. Click on the First Time User? Click Here link in the Sign In box. You will see the New Member Sign Up page. 3. Enter your Neverfail Access Code exactly as it appears below. You will not need to use this code after youve completed the sign-up process. If you do not sign up before the expiration date, you must request a new code. · Neverfail Access Code: [email or web address] Expires: 10/11/2017  8:56 AM 
 
4.  Enter the last four digits of your Social Security Number (xxxx) and Date of Birth (mm/dd/yyyy) as indicated and click Submit. You will be taken to the next sign-up page. 5. Create a sellpoints ID. This will be your sellpoints login ID and cannot be changed, so think of one that is secure and easy to remember. 6. Create a sellpoints password. You can change your password at any time. 7. Enter your Password Reset Question and Answer. This can be used at a later time if you forget your password. 8. Enter your e-mail address. You will receive e-mail notification when new information is available in [address] 9. Click Sign Up. You can now view and download portions of your medical record. 10. Click the Download Summary menu link to download a portable copy of your medical information. If you have questions, please visit the Frequently Asked Questions section of the sellpoints website. Remember, sellpoints is NOT to be used for urgent needs. For medical emergencies, dial 911. Now available from your iPhone and Android! Please provide this summary of care documentation to your next provider. Your primary care clinician is listed as PAUL Medina. If you have any questions after today's visit, please call [phone].

## 2017-07-13 NOTE — PATIENT INSTRUCTIONS
Be sure to use the boost or ensure twice per day. Also start a multivitamin daily. Dry Mouth: Care Instructions  Your Care Instructions    When you have dry mouth, or xerostomia (say \"meena-aman-STO-laith-len\"), your mouth does not make enough saliva. Saliva helps you chew, swallow, and digest your food. It also neutralizes the acids that form in your mouth. If you have ongoing dry mouth, it can lead to mouth infections, gum disease, and tooth decay. It can also make it hard to swallow or talk. Your doctor or dentist may diagnose dry mouth. It is often a side effect of medicines like diuretics, decongestants, and antidepressants. Cancer treatments or damage to the salivary glands can also cause dry mouth. To help fight the effects of dry mouth, your dentist may apply extra fluoride to your teeth. This can help prevent tooth decay. He or she may also give you mouthwash to fight bacteria. You may need to have dental checkups more often. Treatment may include medicine to help you make more saliva. Or, if other medicines you take are making your mouth dry, your doctor may change the type or dosage of the medicine. Follow-up care is a key part of your treatment and safety. Be sure to make and go to all appointments, and call your doctor if you are having problems. It's also a good idea to know your test results and keep a list of the medicines you take. How can you care for yourself at home? · Take frequent sips of liquid throughout the day. Water is best.  · Use ice chips, sugar-free candy, or gum to help keep your mouth moist. (Candy or gum sweetened with xylitol can help prevent tooth decay.)  · Avoid spicy or salty foods. They may cause pain in a dry mouth. · Brush your teeth twice a day, morning and night. Floss once a day. · Schedule checkups and cleanings as often as your dentist recommends it. · Use an over-the-counter saliva substitute. · Avoid caffeine, tobacco, and alcohol.  They can also make your mouth dry. · You may be given medicine for dry mouth. Be safe with medicines. Take your medicines exactly as prescribed. Call your doctor if you think you are having a problem with your medicine. When should you call for help? Watch closely for changes in your health, and be sure to contact your doctor if:  · You do not get better as expected. Where can you learn more? Go to http://gwendolyn-gay.info/. Enter [phone] in the search box to learn more about \"Dry Mouth: Care Instructions. \"  Current as of: May 7, 2017  Content Version: 11.3  © 3932-6455 [employer]X. Care instructions adapted under license by [employer] (which disclaims liability or warranty for this information). If you have questions about a medical condition or this instruction, always ask your healthcare professional. [address] any warranty or liability for your use of this information.

## 2017-07-13 NOTE — PROGRESS NOTES
1. Have you been to the ER, urgent care clinic since your last visit? Hospitalized since your last visit? No.     2. Have you seen or consulted any other health care providers outside of the [address] [place] [address] since your last visit? Include any pap smears or colon screening. No.      Patient presents with taste buds off and he feels like his tongue is swelling.

## 2017-07-13 NOTE — PROGRESS NOTES
Rehan Castillo is a 80 y.o. male and presents with Tongue Swelling and Other (taste buds off)       Subjective:    Feels like taste is off. Not eating well due to this he reports. Not drinking much of the nutritional supplement. Assessment/Plan:    Dysgeusia- possibly due to dry mouth- will try to treat chelitis with antifungal and await labs. Discussed in detail. All questions answered. RTC in 3 weeks but pt asked to call if worsening or new sx develop  Orders Placed This Encounter    TSH 3RD GENERATION     Standing Status:   Future     Number of Occurrences:   1     Standing Expiration Date:   7/14/2018    TAL QL, W/REFLEX CASCADE     Standing Status:   Future     Number of Occurrences:   1     Standing Expiration Date:   7/14/2018    CBC WITH AUTOMATED DIFF     Standing Status:   Future     Number of Occurrences:   1     Standing Expiration Date:   3/28/1316    METABOLIC PANEL, COMPREHENSIVE     Standing Status:   Future     Number of Occurrences:   1     Standing Expiration Date:   7/14/2018    SED RATE (ESR)     Standing Status:   Future     Number of Occurrences:   1     Standing Expiration Date:   7/14/2018    SJOGREN'S ABS, SSA AND SSB     Standing Status:   Future     Number of Occurrences:   1     Standing Expiration Date:   7/14/2018    DISCONTD: venlafaxine-SR (EFFEXOR-XR) 37.5 mg capsule     Sig: Take  by mouth daily.  clotrimazole (LOTRIMIN) 1 % topical cream     Sig: Apply  to affected area two (2) times a day. Use for 2 weeks. Dispense:  30 g     Refill:  1           ROS:  Negative except as mentioned above  Cardiac- no chest pain or palpitations  Pulmonary- no sob or wheezes  GI- no n/v or diarrhea.       SH:  Social History   Substance Use Topics    Smoking status: Former Smoker     Years: 8.00     Quit date: 11/7/1953    Smokeless tobacco: Never Used    Alcohol use No         Medications/Allergies:  Current Outpatient Prescriptions on File Prior to Visit   Medication Sig Dispense Refill    metoprolol tartrate (LOPRESSOR) 25 mg tablet Take 12.5 mg by mouth daily.  pravastatin (PRAVACHOL) 40 mg tablet Take 1 Tab by mouth nightly. 90 Tab 3    albuterol (PROVENTIL HFA, VENTOLIN HFA, PROAIR HFA) 90 mcg/actuation inhaler Take 1 Puff by inhalation every four (4) hours as needed for Wheezing. 1 Inhaler 1    cyanocobalamin (VITAMIN B-12) 1,000 mcg tablet Take 1 Tab by mouth daily. 90 Tab 3    calcium-cholecalciferol, D3, (CALCARB 600 WITH VITAMIN D) tablet Take 1 Tab by mouth two (2) times a day. 180 Tab 3    aspirin delayed-release 81 mg tablet Take 1 Tab by mouth daily. 90 Tab 3     No current facility-administered medications on file prior to visit. Allergies   Allergen Reactions    Ace Inhibitors Cough    Codeine Other (comments)     headache    Effexor [Venlafaxine] Other (comments)     confusion    Fosamax [Alendronate] Other (comments)     peridontal pain    Procardia [Nifedipine] Swelling       Objective:  Visit Vitals    /51    Pulse 76    Temp 96.4 °F (35.8 °C) (Oral)    Resp 16    Ht 5' 6\" (1.676 m)    Wt 155 lb 6.4 oz (70.5 kg)    BMI 25.08 kg/m2    Body mass index is 25.08 kg/(m^2). Constitutional: Well developed, nourished, no distress, alert   CV: S1, S2.  RRR. No murmurs/rubs. No thrills palpated. No carotid bruits. Intact distal pulses. No edema. Pulm: No abnormalities on inspection. Clear to auscultation bilaterally. No wheezing/rhonchi. Normal effort. GI: Soft, nontender, nondistended. Normal active bowel sounds. No  masses on palpation. No hepatosplenomegaly.

## 2017-07-14 ENCOUNTER — PATIENT OUTREACH (OUTPATIENT)
Dept: FAMILY MEDICINE CLINIC | Age: 82
End: 2017-07-14

## 2017-07-14 LAB
ANA SER QL: NEGATIVE
ENA SS-A AB SER-ACNC: <0.2 AI (ref 0–0.9)
ENA SS-B AB SER-ACNC: <0.2 AI (ref 0–0.9)
SEE BELOW:, 164879: NORMAL

## 2017-07-14 RX ORDER — ATORVASTATIN CALCIUM 40 MG/1
40 TABLET, FILM COATED ORAL DAILY
COMMUNITY
Start: 2017-06-14 | End: 2018-02-07 | Stop reason: SDUPTHER

## 2017-07-14 NOTE — PROGRESS NOTES
Patient stopped by office with his Metoprolol and Atorvastatin bottles, does not take any other prescribed medications except for OTC vitamins. Medication list updated.

## 2017-07-14 NOTE — PROGRESS NOTES
Called patient, currently at his son's home. He knows he is taking Metoprolol 1/2 tablet daily and will call back with name of cholesterol pill.

## 2017-07-20 ENCOUNTER — OFFICE VISIT (OUTPATIENT)
Dept: FAMILY MEDICINE CLINIC | Age: 82
End: 2017-07-20

## 2017-07-20 ENCOUNTER — PATIENT OUTREACH (OUTPATIENT)
Dept: FAMILY MEDICINE CLINIC | Age: 82
End: 2017-07-20

## 2017-07-20 VITALS
TEMPERATURE: 97.6 F | HEART RATE: 71 BPM | HEIGHT: 66 IN | RESPIRATION RATE: 16 BRPM | DIASTOLIC BLOOD PRESSURE: 42 MMHG | WEIGHT: 154 LBS | SYSTOLIC BLOOD PRESSURE: 139 MMHG | BODY MASS INDEX: 24.75 KG/M2

## 2017-07-20 DIAGNOSIS — B37.0 ORAL CANDIDIASIS: Primary | ICD-10-CM

## 2017-07-20 DIAGNOSIS — K13.0 CHEILITIS: ICD-10-CM

## 2017-07-20 RX ORDER — CLOTRIMAZOLE 10 MG/1
10 LOZENGE ORAL; TOPICAL
Qty: 50 TAB | Refills: 0 | Status: SHIPPED | OUTPATIENT
Start: 2017-07-20 | End: 2017-07-30

## 2017-07-20 NOTE — PROGRESS NOTES
Honey Herzog is a 80 y.o. male and presents with Tongue Swelling and Results (labs)       Subjective: Angular chelitis- improving with cream pt reports  Tongue and mouth still feels \"different\" but \"it feels more normal now\". Also had one episode of n/v. Assessment/Plan:    Angular cheilitis- continue antifungal cream. Reviewed labs with pt. Mouth sx- will tx for oral candidiasis given cheilitis- if not improving consider ENT referrral. Labs are unremar    RTC 1 month  Orders Placed This Encounter    clotrimazole (MYCELEX) 10 mg lidia     Sig: Take 1 Tab by mouth five (5) times daily for 10 days. Dispense:  50 Tab     Refill:  0       Je was seen today for tongue swelling and results. Diagnoses and all orders for this visit:    Oral candidiasis    Cheilitis    Other orders  -     clotrimazole (MYCELEX) 10 mg lidia; Take 1 Tab by mouth five (5) times daily for 10 days. ROS:  Negative except as mentioned above  Cardiac- no chest pain or palpitations  Pulmonary- no sob or wheezes  GI- no n/v or diarrhea. SH:  Social History   Substance Use Topics    Smoking status: Former Smoker     Years: 8.00     Quit date: 11/7/1953    Smokeless tobacco: Never Used    Alcohol use No         Medications/Allergies:  Current Outpatient Prescriptions on File Prior to Visit   Medication Sig Dispense Refill    atorvastatin (LIPITOR) 40 mg tablet Take 40 mg by mouth daily.  clotrimazole (LOTRIMIN) 1 % topical cream Apply  to affected area two (2) times a day. Use for 2 weeks. 30 g 1    metoprolol tartrate (LOPRESSOR) 25 mg tablet Take 12.5 mg by mouth daily.  aspirin delayed-release 81 mg tablet Take 1 Tab by mouth daily. 90 Tab 3    pravastatin (PRAVACHOL) 40 mg tablet Take 1 Tab by mouth nightly. 90 Tab 3    albuterol (PROVENTIL HFA, VENTOLIN HFA, PROAIR HFA) 90 mcg/actuation inhaler Take 1 Puff by inhalation every four (4) hours as needed for Wheezing.  1 Inhaler 1    cyanocobalamin (VITAMIN B-12) 1,000 mcg tablet Take 1 Tab by mouth daily. 90 Tab 3    calcium-cholecalciferol, D3, (CALCARB 600 WITH VITAMIN D) tablet Take 1 Tab by mouth two (2) times a day. 180 Tab 3     No current facility-administered medications on file prior to visit. Allergies   Allergen Reactions    Ace Inhibitors Cough    Codeine Other (comments)     headache    Effexor [Venlafaxine] Other (comments)     confusion    Fosamax [Alendronate] Other (comments)     peridontal pain    Procardia [Nifedipine] Swelling       Objective:  Visit Vitals    /42    Pulse 71    Temp 97.6 °F (36.4 °C) (Oral)    Resp 16    Ht 5' 6\" (1.676 m)    Wt 154 lb (69.9 kg)    BMI 24.86 kg/m2    Body mass index is 24.86 kg/(m^2). Constitutional: Well developed, nourished, no distress, alert   HENT: Exterior ears and tympanic membranes normal bilaterally. Supple neck. No thyromegaly or lymphadenopathy. Oropharynx clear and moist mucous membranes. Eyes: Conjunctiva normal. PERRL. CV: S1, S2.  RRR. No murmurs/rubs. No thrills palpated. No carotid bruits. Intact distal pulses. No edema. Pulm: No abnormalities on inspection. Clear to auscultation bilaterally. No wheezing/rhonchi. Normal effort. GI: Soft, nontender, nondistended. Normal active bowel sounds. No  masses on palpation. No hepatosplenomegaly.

## 2017-07-20 NOTE — PATIENT INSTRUCTIONS
Stomatitis: Care Instructions  Your Care Instructions  Stomatitis is swelling and redness of the lining of your mouth. It can cause painful sores that can make it hard for you to eat, drink, or swallow. Stomatitis may be caused by a viral or bacterial infection, a disease, or not taking care of your teeth and gums properly. Other causes include reactions to smoking, burns from hot food or drinks, or an allergic reaction. Allergy causes may be a result of flavorings such as spearmint or cinnamon that are used in chewing gum, toothpaste, soft drinks, candies, and other products. Your doctor may prescribe pain medicines or special mouth rinses. He or she may tell you to quit using some products that may be causing the sores. It can take up to 2 weeks for the sores to heal.  Some people with stomatitis also get a yeast infection of the mouth, called thrush. Medicines can treat this problem. Follow-up care is a key part of your treatment and safety. Be sure to make and go to all appointments, and call your doctor if you are having problems. It's also a good idea to know your test results and keep a list of the medicines you take. How can you care for yourself at home? · Be safe with medicines. Read and follow all instructions on the label. ¨ If the doctor gave you a prescription medicine for pain, take it as prescribed. ¨ If you are not taking a prescription pain medicine, ask your doctor if you can take an over-the-counter medicine. · If your doctor prescribed antibiotics, take them as directed. Do not stop taking them just because you feel better. You need to take the full course of antibiotics. · Use prescription mouth rinses as prescribed. Ask your doctor if you can freeze the mouth rinse in an ice cube tray. Sucking on a frozen cube of the mouth rinse can help ease the pain. · Make a rinse to keep your mouth from getting dry. Add 1 teaspoon baking soda and ½ teaspoon salt to a quart of water.  Use it to rinse your mouth 4 to 6 times each day. Spit out the rinse. Do not swallow it. · Do not use a mouthwash or other over-the-counter rinse with alcohol. These can dry out your mouth or cause more pain. · If your doctor gave you mouthwash or lozenges to treat your yeast infection, use them as directed. · Eat soft foods such as mashed potatoes and other cooked vegetables, noodles, applesauce, clear broth soups, yogurt, and cottage cheese. You can get extra protein by adding protein powder to milk shakes or breakfast drinks. Avoid eating spicy or crunchy foods. · Try eating cold foods such as ice cream or yogurt. · Avoid drinking high-acid juices such as orange, grapefruit, and cranberry juices. · Drink plenty of fluids to prevent dehydration. Drinking through a straw may help with pain. · Practice good oral hygiene. Use a very soft toothbrush to brush your teeth at least two times a day. Or use your finger to brush your teeth if your mouth is sore. When should you call for help? Call your doctor now or seek immediate medical care if:  · You have new or worse symptoms of infection, such as:  ¨ Increased pain, swelling, warmth, or redness. ¨ Red streaks leading from the area. ¨ Pus draining from the area. ¨ A fever. Watch closely for changes in your health, and be sure to contact your doctor if:  · You do not get better as expected. Where can you learn more? Go to http://gwendolyn-gay.info/. Enter V594 in the search box to learn more about \"Stomatitis: Care Instructions. \"  Current as of: November 17, 2016  Content Version: 11.3  © 7142-5786 [employer]. Care instructions adapted under license by [employer] (which disclaims liability or warranty for this information).  If you have questions about a medical condition or this instruction, always ask your healthcare professional. [address] any warranty or liability for your use of this information. Keep using the clotrimazole cream on the corners of your mouth until your follow up.

## 2017-07-20 NOTE — PROGRESS NOTES
1. Have you been to the ER, urgent care clinic since your last visit? Hospitalized since your last visit? No.     2. Have you seen or consulted any other health care providers outside of the [address] [place] [address] since your last visit? Include any pap smears or colon screening. No.    Patient presents with tongue swelling and felt like his mouth is sore and inflammed.

## 2017-08-04 ENCOUNTER — PATIENT OUTREACH (OUTPATIENT)
Dept: FAMILY MEDICINE CLINIC | Age: 82
End: 2017-08-04

## 2017-08-04 NOTE — PROGRESS NOTES
Patient cancelled his 8/2//17 with Dr. Kerry Portillo, rescheduled for 9/7/17.     Called patient, left voice mail message for patient to return my call

## 2017-09-07 ENCOUNTER — OFFICE VISIT (OUTPATIENT)
Dept: FAMILY MEDICINE CLINIC | Age: 82
End: 2017-09-07

## 2017-09-07 ENCOUNTER — HOSPITAL ENCOUNTER (OUTPATIENT)
Dept: LAB | Age: 82
Discharge: HOME | End: 2017-09-07
Payer: MEDICARE

## 2017-09-07 VITALS
RESPIRATION RATE: 16 BRPM | TEMPERATURE: 97.6 F | DIASTOLIC BLOOD PRESSURE: 42 MMHG | SYSTOLIC BLOOD PRESSURE: 144 MMHG | HEART RATE: 62 BPM | HEIGHT: 66 IN | WEIGHT: 154.4 LBS | BODY MASS INDEX: 24.81 KG/M2

## 2017-09-07 DIAGNOSIS — I10 ESSENTIAL HYPERTENSION: ICD-10-CM

## 2017-09-07 DIAGNOSIS — I71.40 ABDOMINAL AORTIC ANEURYSM WITHOUT RUPTURE: ICD-10-CM

## 2017-09-07 DIAGNOSIS — R07.9 RIGHT-SIDED CHEST PAIN: Primary | ICD-10-CM

## 2017-09-07 DIAGNOSIS — Z23 ENCOUNTER FOR IMMUNIZATION: ICD-10-CM

## 2017-09-07 LAB
APPEARANCE UR: CLEAR
BILIRUB UR QL: NEGATIVE
COLOR UR: YELLOW
GLUCOSE UR STRIP.AUTO-MCNC: NEGATIVE MG/DL
HGB UR QL STRIP: NEGATIVE
KETONES UR QL STRIP.AUTO: NEGATIVE MG/DL
LEUKOCYTE ESTERASE UR QL STRIP.AUTO: NEGATIVE
NITRITE UR QL STRIP.AUTO: NEGATIVE
PH UR STRIP: 5 [PH] (ref 5–8)
PROT UR STRIP-MCNC: NEGATIVE MG/DL
SP GR UR REFRACTOMETRY: 1.02 (ref 1–1.03)
UROBILINOGEN UR QL STRIP.AUTO: 0.2 EU/DL (ref 0.2–1)

## 2017-09-07 PROCEDURE — 81003 URINALYSIS AUTO W/O SCOPE: CPT | Performed by: INTERNAL MEDICINE

## 2017-09-07 RX ORDER — CLOTRIMAZOLE 10 MG/1
10 LOZENGE ORAL; TOPICAL
COMMUNITY
End: 2017-09-07 | Stop reason: ALTCHOICE

## 2017-09-07 NOTE — MR AVS SNAPSHOT
Visit Information Date & Time Provider Department Dept. Phone Encounter #  
 9/7/2017 10:00 AM Aime Horta, [address] [facility] [phone] [id number] Follow-up Instructions Return in about 3 months (around 12/7/2017) for 30 minute slot. Your Appointments 2/12/2018  9:00 AM  
Office Visit with Aime Horta MD  
[address] Elaine [place]hat. [address]  
  
   
 [address] Upcoming Health Maintenance Date Due INFLUENZA AGE 9 TO ADULT 8/1/2017 DTaP/Tdap/Td series (1 - Tdap) 11/13/2017* MEDICARE YEARLY EXAM 2/11/2018 GLAUCOMA SCREENING Q2Y 7/13/2019 *Topic was postponed. The date shown is not the original due date. Allergies as of 9/7/2017  Review Complete On: 9/7/2017 By: Aime Horta MD  
  
 Severity Noted Reaction Type Reactions Ace Inhibitors  11/29/2010    Cough Codeine  11/19/2010    Other (comments)  
 headache Effexor [Venlafaxine]  06/14/2017    Other (comments)  
 confusion Fosamax [Alendronate]  11/19/2010    Other (comments) peridontal pain Procardia [Nifedipine]  11/19/2010    Swelling Current Immunizations  Reviewed on 9/9/2016 Name Date Influenza High Dose Vaccine PF  Incomplete, 11/15/2016 Influenza Vaccine 11/5/2014, 10/8/2013 Influenza Vaccine (Quad) 9/8/2015 12:00 PM  
 Pneumococcal Conjugate (PCV-13) 11/15/2016 ZZZ-RETIRED (DO NOT USE) Pneumococcal Vaccine (Unspecified Type) 11/7/2011 Not reviewed this visit You Were Diagnosed With   
  
 Codes Comments Right-sided chest pain    -  Primary ICD-10-CM: R07.9 ICD-9-CM: 786.50 posterior Abdominal aortic aneurysm without rupture (HCC)     ICD-10-CM: I71.4 ICD-9-CM: 441.4 Essential hypertension     ICD-10-CM: I10 
ICD-9-CM: 401.9 Encounter for immunization     ICD-10-CM: U56 ICD-9-CM: V03.89   
  
 Vitals BP Pulse Temp Resp Height(growth percentile) Weight(growth percentile) 144/42 62 97.6 °F (36.4 °C) (Oral) 16 5' 6\" (1.676 m) 154 lb 6.4 oz (70 kg) BMI Smoking Status 24.92 kg/m2 Former Smoker Vitals History BMI and BSA Data Body Mass Index Body Surface Area 24.92 kg/m 2 1.81 m 2 Preferred Pharmacy Pharmacy Name Phone [phone] [place] [address] Rd, [address] [place] [address] [phone] Your Updated Medication List  
  
   
This list is accurate as of: 9/7/17 10:42 AM.  Always use your most recent med list.  
  
  
  
  
 albuterol 90 mcg/actuation inhaler Commonly known as:  PROVENTIL HFA, VENTOLIN HFA, PROAIR HFA Take 1 Puff by inhalation every four (4) hours as needed for Wheezing. aspirin delayed-release 81 mg tablet Take 1 Tab by mouth daily. atorvastatin 40 mg tablet Commonly known as:  LIPITOR Take 40 mg by mouth daily. calcium-cholecalciferol (D3) tablet Commonly known as:  CALCARB 600 WITH VITAMIN D Take 1 Tab by mouth two (2) times a day. clotrimazole 1 % topical cream  
Commonly known as:  Lindatanisha Hasting Apply  to affected area two (2) times a day. Use for 2 weeks. cyanocobalamin 1,000 mcg tablet Commonly known as:  VITAMIN B-12 Take 1 Tab by mouth daily. metoprolol tartrate 25 mg tablet Commonly known as:  LOPRESSOR Take 12.5 mg by mouth daily. pravastatin 40 mg tablet Commonly known as:  PRAVACHOL Take 1 Tab by mouth nightly. We Performed the Following INFLUENZA VIRUS VACCINE, HIGH DOSE SEASONAL, PRESERVATIVE FREE [56063 CPT(R)] Follow-up Instructions Return in about 3 months (around 12/7/2017) for 30 minute slot. To-Do List   
 09/07/2017 Lab:  URINALYSIS W/ RFLX MICROSCOPIC Patient Instructions Consider trying to contact the [address] in [facility]: 
 
Location [address] [place] [address] [facility], [facility] Phone & Fax Phone: [phone] Fax: Not Available Hours of Operation Monday - Friday ([facility]) 8:00 a.m. - 
4:30 p.m. Vaccine Information Statement Influenza (Flu) Vaccine (Inactivated or Recombinant): What you need to know Many Vaccine Information Statements are available in [place] and other languages. See www.immunize.org/vis Hojas de Información Sobre Vacunas están disponibles en Español y en muchos otros idiomas. Visite www.immunize.org/vis 1. Why get vaccinated? Influenza (flu) is a contagious disease that spreads around the [place] every year, usually between October and May. Flu is caused by influenza viruses, and is spread mainly by coughing, sneezing, and close contact. Anyone can get flu. Flu strikes suddenly and can last several days. Symptoms vary by age, but can include: 
 fever/chills  sore throat  muscle aches  fatigue  cough  headache  runny or stuffy nose Flu can also lead to pneumonia and blood infections, and cause diarrhea and seizures in children. If you have a medical condition, such as heart or lung disease, flu can make it worse. Flu is more dangerous for some people. Infants and young children, people 72years of age and older, pregnant women, and people with certain health conditions or a weakened immune system are at greatest risk. Each year thousands of people in the [facility] die from flu, and many more are hospitalized. Flu vaccine can: 
 keep you from getting flu, 
 make flu less severe if you do get it, and 
 keep you from spreading flu to your family and other people. 2. Inactivated and recombinant flu vaccines A dose of flu vaccine is recommended every flu season. Children 6 months through 6years of age may need two doses during the same flu season. Everyone else needs only one dose each flu season. Some inactivated flu vaccines contain a very small amount of a mercury-based preservative called thimerosal. Studies have not shown thimerosal in vaccines to be harmful, but flu vaccines that do not contain thimerosal are available. There is no live flu virus in flu shots. They cannot cause the flu. There are many flu viruses, and they are always changing. Each year a new flu vaccine is made to protect against three or four viruses that are likely to cause disease in the upcoming flu season. But even when the vaccine doesnt exactly match these viruses, it may still provide some protection Flu vaccine cannot prevent: 
 flu that is caused by a virus not covered by the vaccine, or 
 illnesses that look like flu but are not. It takes about 2 weeks for protection to develop after vaccination, and protection lasts through the flu season. 3. Some people should not get this vaccine Tell the person who is giving you the vaccine:  If you have any severe, life-threatening allergies. If you ever had a life-threatening allergic reaction after a dose of flu vaccine, or have a severe allergy to any part of this vaccine, you may be advised not to get vaccinated. Most, but not all, types of flu vaccine contain a small amount of egg protein.  If you ever had Guillain-Barré Syndrome (also called GBS). Some people with a history of GBS should not get this vaccine. This should be discussed with your doctor.  If you are not feeling well. It is usually okay to get flu vaccine when you have a mild illness, but you might be asked to come back when you feel better. 4. Risks of a vaccine reaction With any medicine, including vaccines, there is a chance of reactions. These are usually mild and go away on their own, but serious reactions are also possible. Most people who get a flu shot do not have any problems with it. Minor problems following a flu shot include:  soreness, redness, or swelling where the shot was given  hoarseness  sore, red or itchy eyes  cough  fever  aches  headache  itching  fatigue If these problems occur, they usually begin soon after the shot and last 1 or 2 days. More serious problems following a flu shot can include the following:  There may be a small increased risk of Guillain-Barré Syndrome (GBS) after inactivated flu vaccine. This risk has been estimated at 1 or 2 additional cases per million people vaccinated. This is much lower than the risk of severe complications from flu, which can be prevented by flu vaccine.  Young children who get the flu shot along with pneumococcal vaccine (PCV13) and/or DTaP vaccine at the same time might be slightly more likely to have a seizure caused by fever. Ask your doctor for more information. Tell your doctor if a child who is getting flu vaccine has ever had a seizure. Problems that could happen after any injected vaccine:  People sometimes faint after a medical procedure, including vaccination. Sitting or lying down for about 15 minutes can help prevent fainting, and injuries caused by a fall. Tell your doctor if you feel dizzy, or have vision changes or ringing in the ears.  Some people get severe pain in the shoulder and have difficulty moving the arm where a shot was given. This happens very rarely.  Any medication can cause a severe allergic reaction. Such reactions from a vaccine are very rare, estimated at about 1 in a million doses, and would happen within a few minutes to a few hours after the vaccination. As with any medicine, there is a very remote chance of a vaccine causing a serious injury or death. The safety of vaccines is always being monitored. For more information, visit: www.cdc.gov/vaccinesafety/ 
 
5. What if there is a serious reaction? What should I look for?  Look for anything that concerns you, such as signs of a severe allergic reaction, very high fever, or unusual behavior. Signs of a severe allergic reaction can include hives, swelling of the face and throat, difficulty breathing, a fast heartbeat, dizziness, and weakness  usually within a few minutes to a few hours after the vaccination. What should I do?  If you think it is a severe allergic reaction or other emergency that cant wait, call 9-1-1 and get the person to the nearest hospital. Otherwise, call your doctor.  Reactions should be reported to the Vaccine Adverse Event Reporting System (VAERS). Your doctor should file this report, or you can do it yourself through  the VAERS web site at www.vaers. [facility].gov, or by calling 9-102.631.6579. VAERS does not give medical advice. 6. The National Vaccine Injury Compensation Program 
 
The [facility] Vaccine Injury Compensation Program (VICP) is a federal program that was created to compensate people who may have been injured by certain vaccines. Persons who believe they may have been injured by a vaccine can learn about the program and about filing a claim by calling 3-153.354.8011 or visiting the [address] [place] [address] [address] website at www.[facility].gov/vaccinecompensation. There is a time limit to file a claim for compensation. 7. How can I learn more?  Ask your healthcare provider. He or she can give you the vaccine package insert or suggest other sources of information.  Call your local or state health department.  Contact the Centers for Disease Control and Prevention (CDC): 
- Call 7-281.494.1473 (1-800-CDC-INFO) or 
- Visit CDCs website at www.cdc.gov/flu Vaccine Information Statement Inactivated Influenza Vaccine 8/7/2015 
[address] 559FX-73 Department of Health and [place] Centers for Disease Control and Prevention Office Use Only Abdominal Aortic Aneurysm: Care Instructions Your Care Instructions An abdominal aortic aneurysm is a stretched and bulging area of the aorta. The aorta is the large blood vessel that takes oxygen-rich blood from the heart to the rest of the body. This type of aneurysm is in the belly, where the aorta takes blood to the lower body. If an aneurysm gets too big, it can cause serious problems. A bulging aorta is weak and can burst, or rupture. This causes life-threatening bleeding. If your doctor has determined that your aneurysm is small and not growing fast, it is safe to watch the aneurysm carefully and wait on surgery. If the aneurysm is larger, surgery may be the safest choice. In some cases, your doctor may be able to put in a type of graft, called a stent, to fix the aneurysm without doing major surgery. Follow-up care is a key part of your treatment and safety. Be sure to make and go to all appointments, and call your doctor if you are having problems. It's also a good idea to know your test results and keep a list of the medicines you take. How can you care for yourself at home? · Take your medicines exactly as prescribed. Call your doctor if you think you are having a problem with your medicine. You may take a medicine to lower your blood pressure. This lowers stress on your aorta. If you have high cholesterol, you also may take a statin medicine. · Follow a heart-healthy diet. ¨ Eat lots of fruits and vegetables, whole grains, fish, and low-fat or nonfat dairy foods. ¨ Eat lean meats. Limit saturated fat and avoid trans fat. ¨ Limit processed food, sodium, alcohol, and sweets. · If your doctor recommends it, get more exercise. Walking is a good choice. Bit by bit, increase the amount you walk every day. Try for at least 30 minutes on most days of the week. · Talk to your doctor about when you can do more active workouts. · Manage your weight.  Being at a healthy weight will not likely change your aortic aneurysm, but it may lower the chance of complications if you ever need surgery. · Do not smoke or allow others to smoke around you. Smoking can make your condition worse. If you need help quitting, talk to your doctor about stop-smoking programs and medicines. These can increase your chances of quitting for good. When should you call for help? Call 911 anytime you think you may need emergency care. For example, call if: 
· You have severe pain in your belly, back, or chest. 
· You passed out (lost consciousness). · You have severe trouble breathing. Call your doctor now or seek immediate medical care if: 
· You are dizzy or lightheaded, or you feel like you may faint. · One or both feet change color, are painful, feel cool, or burn or tingle. Watch closely for changes in your health, and be sure to contact your doctor if you have any problems. Where can you learn more? Go to http://gwendolyn-gay.info/. Enter P928 in the search box to learn more about \"Abdominal Aortic Aneurysm: Care Instructions. \" Current as of: March 20, 2017 Content Version: 11.3 © 3061-7658 [employer]. Care instructions adapted under license by [employer] (which disclaims liability or warranty for this information). If you have questions about a medical condition or this instruction, always ask your healthcare professional. [address] any warranty or liability for your use of this information. Introducing [facility]! Lizette Cramer introduces [employer] patient portal. Now you can access parts of your medical record, email your doctor's office, and request medication refills online. 1. In your internet browser, go to [email or web address] 2. Click on the First Time User? Click Here link in the Sign In box. You will see the New Member Sign Up page. 3. Enter your [employer] Access Code exactly as it appears below. You will not need to use this code after youve completed the sign-up process.  If you do not sign up before the expiration date, you must request a new code. · [employer] Access Code: [email or web address] Expires: 10/11/2017  8:56 AM 
 
4. Enter the last four digits of your Social Security Number (xxxx) and Date of Birth (mm/dd/yyyy) as indicated and click Submit. You will be taken to the next sign-up page. 5. Create a [employer] ID. This will be your [employer] login ID and cannot be changed, so think of one that is secure and easy to remember. 6. Create a [employer] password. You can change your password at any time. 7. Enter your Password Reset Question and Answer. This can be used at a later time if you forget your password. 8. Enter your e-mail address. You will receive e-mail notification when new information is available in [address]. 9. Click Sign Up. You can now view and download portions of your medical record. 10. Click the Download Summary menu link to download a portable copy of your medical information. If you have questions, please visit the Frequently Asked Questions section of the [employer] website. Remember, [employer] is NOT to be used for urgent needs. For medical emergencies, dial 911. Now available from your iPhone and Android! Please provide this summary of care documentation to your next provider. Your primary care clinician is listed as PAUL Medina. If you have any questions after today's visit, please call [phone].

## 2017-09-07 NOTE — PATIENT INSTRUCTIONS
Consider trying to contact the [facility] in [place]:    Location  [facility], [address]  Phone & Fax  Phone: [phone]  Fax: Not Available  Hours of Operation  Monday - Friday (excluding Federal Holidays)  8:00 a.m. -  4:30 p.m. Vaccine Information Statement    Influenza (Flu) Vaccine (Inactivated or Recombinant): What you need to know    Many Vaccine Information Statements are available in Frisian and other languages. See www.immunize.org/vis  Hojas de Información Sobre Vacunas están disponibles en Español y en muchos otros idiomas. Visite www.immunize.org/vis    1. Why get vaccinated? Influenza (flu) is a contagious disease that spreads around the [place] every year, usually between October and May. Flu is caused by influenza viruses, and is spread mainly by coughing, sneezing, and close contact. Anyone can get flu. Flu strikes suddenly and can last several days. Symptoms vary by age, but can include:   fever/chills   sore throat   muscle aches   fatigue   cough   headache    runny or stuffy nose    Flu can also lead to pneumonia and blood infections, and cause diarrhea and seizures in children. If you have a medical condition, such as heart or lung disease, flu can make it worse. Flu is more dangerous for some people. Infants and young children, people 72years of age and older, pregnant women, and people with certain health conditions or a weakened immune system are at greatest risk. Each year thousands of people in the [facility] die from flu, and many more are hospitalized. Flu vaccine can:   keep you from getting flu,   make flu less severe if you do get it, and   keep you from spreading flu to your family and other people. 2. Inactivated and recombinant flu vaccines    A dose of flu vaccine is recommended every flu season. Children 6 months through 6years of age may need two doses during the same flu season.   Everyone else needs only one dose each flu season. Some inactivated flu vaccines contain a very small amount of a mercury-based preservative called thimerosal. Studies have not shown thimerosal in vaccines to be harmful, but flu vaccines that do not contain thimerosal are available. There is no live flu virus in flu shots. They cannot cause the flu. There are many flu viruses, and they are always changing. Each year a new flu vaccine is made to protect against three or four viruses that are likely to cause disease in the upcoming flu season. But even when the vaccine doesnt exactly match these viruses, it may still provide some protection    Flu vaccine cannot prevent:   flu that is caused by a virus not covered by the vaccine, or   illnesses that look like flu but are not. It takes about 2 weeks for protection to develop after vaccination, and protection lasts through the flu season. 3. Some people should not get this vaccine    Tell the person who is giving you the vaccine:     If you have any severe, life-threatening allergies. If you ever had a life-threatening allergic reaction after a dose of flu vaccine, or have a severe allergy to any part of this vaccine, you may be advised not to get vaccinated. Most, but not all, types of flu vaccine contain a small amount of egg protein.  If you ever had Guillain-Barré Syndrome (also called GBS). Some people with a history of GBS should not get this vaccine. This should be discussed with your doctor.  If you are not feeling well. It is usually okay to get flu vaccine when you have a mild illness, but you might be asked to come back when you feel better. 4. Risks of a vaccine reaction    With any medicine, including vaccines, there is a chance of reactions. These are usually mild and go away on their own, but serious reactions are also possible. Most people who get a flu shot do not have any problems with it.      Minor problems following a flu shot include:    soreness, redness, or swelling where the shot was given     hoarseness   sore, red or itchy eyes   cough   fever   aches   headache   itching   fatigue  If these problems occur, they usually begin soon after the shot and last 1 or 2 days. More serious problems following a flu shot can include the following:     There may be a small increased risk of Guillain-Barré Syndrome (GBS) after inactivated flu vaccine. This risk has been estimated at 1 or 2 additional cases per million people vaccinated. This is much lower than the risk of severe complications from flu, which can be prevented by flu vaccine.  Young children who get the flu shot along with pneumococcal vaccine (PCV13) and/or DTaP vaccine at the same time might be slightly more likely to have a seizure caused by fever. Ask your doctor for more information. Tell your doctor if a child who is getting flu vaccine has ever had a seizure. Problems that could happen after any injected vaccine:      People sometimes faint after a medical procedure, including vaccination. Sitting or lying down for about 15 minutes can help prevent fainting, and injuries caused by a fall. Tell your doctor if you feel dizzy, or have vision changes or ringing in the ears.  Some people get severe pain in the shoulder and have difficulty moving the arm where a shot was given. This happens very rarely.  Any medication can cause a severe allergic reaction. Such reactions from a vaccine are very rare, estimated at about 1 in a million doses, and would happen within a few minutes to a few hours after the vaccination. As with any medicine, there is a very remote chance of a vaccine causing a serious injury or death. The safety of vaccines is always being monitored. For more information, visit: www.cdc.gov/vaccinesafety/    5. What if there is a serious reaction? What should I look for?      Look for anything that concerns you, such as signs of a severe allergic reaction, very high fever, or unusual behavior. Signs of a severe allergic reaction can include hives, swelling of the face and throat, difficulty breathing, a fast heartbeat, dizziness, and weakness  usually within a few minutes to a few hours after the vaccination. What should I do?  If you think it is a severe allergic reaction or other emergency that cant wait, call 9-1-1 and get the person to the nearest hospital. Otherwise, call your doctor.  Reactions should be reported to the Vaccine Adverse Event Reporting System (VAERS). Your doctor should file this report, or you can do it yourself through  the VAERS web site at www.vaers. [facility].gov, or by calling 9-743.374.9216. VAERS does not give medical advice. 6. The National Vaccine Injury Compensation Program    The [facility] Vaccine Injury Compensation Program (VICP) is a federal program that was created to compensate people who may have been injured by certain vaccines. Persons who believe they may have been injured by a vaccine can learn about the program and about filing a claim by calling 4-493.220.4042 or visiting the [address] [place], [place] [address] website at www.[facility].gov/vaccinecompensation. There is a time limit to file a claim for compensation. 7. How can I learn more?  Ask your healthcare provider. He or she can give you the vaccine package insert or suggest other sources of information.  Call your local or state health department.  Contact the Centers for Disease Control and Prevention (CDC):  - Call 7-341.327.1031 (1-800-CDC-INFO) or  - Visit CDCs website at www.cdc.gov/flu    Vaccine Information Statement   Inactivated Influenza Vaccine   8/7/2015  42 ANAND Fernández [id number]    Department of Health and Human Services  Centers for Disease Control and Prevention    Office Use Only       Abdominal Aortic Aneurysm: Care Instructions  Your Care Instructions  An abdominal aortic aneurysm is a stretched and bulging area of the aorta. The aorta is the large blood vessel that takes oxygen-rich blood from the heart to the rest of the body. This type of aneurysm is in the belly, where the aorta takes blood to the lower body. If an aneurysm gets too big, it can cause serious problems. A bulging aorta is weak and can burst, or rupture. This causes life-threatening bleeding. If your doctor has determined that your aneurysm is small and not growing fast, it is safe to watch the aneurysm carefully and wait on surgery. If the aneurysm is larger, surgery may be the safest choice. In some cases, your doctor may be able to put in a type of graft, called a stent, to fix the aneurysm without doing major surgery. Follow-up care is a key part of your treatment and safety. Be sure to make and go to all appointments, and call your doctor if you are having problems. It's also a good idea to know your test results and keep a list of the medicines you take. How can you care for yourself at home? · Take your medicines exactly as prescribed. Call your doctor if you think you are having a problem with your medicine. You may take a medicine to lower your blood pressure. This lowers stress on your aorta. If you have high cholesterol, you also may take a statin medicine. · Follow a heart-healthy diet. ¨ Eat lots of fruits and vegetables, whole grains, fish, and low-fat or nonfat dairy foods. ¨ Eat lean meats. Limit saturated fat and avoid trans fat. ¨ Limit processed food, sodium, alcohol, and sweets. · If your doctor recommends it, get more exercise. Walking is a good choice. Bit by bit, increase the amount you walk every day. Try for at least 30 minutes on most days of the week. · Talk to your doctor about when you can do more active workouts. · Manage your weight. Being at a healthy weight will not likely change your aortic aneurysm, but it may lower the chance of complications if you ever need surgery.   · Do not smoke or allow others to smoke around you. Smoking can make your condition worse. If you need help quitting, talk to your doctor about stop-smoking programs and medicines. These can increase your chances of quitting for good. When should you call for help? Call 911 anytime you think you may need emergency care. For example, call if:  · You have severe pain in your belly, back, or chest.  · You passed out (lost consciousness). · You have severe trouble breathing. Call your doctor now or seek immediate medical care if:  · You are dizzy or lightheaded, or you feel like you may faint. · One or both feet change color, are painful, feel cool, or burn or tingle. Watch closely for changes in your health, and be sure to contact your doctor if you have any problems. Where can you learn more? Go to http://gwendolyn-gay.info/. Enter C206 in the search box to learn more about \"Abdominal Aortic Aneurysm: Care Instructions. \"  Current as of: March 20, 2017  Content Version: 11.3  © 9132-8596 Healthwise, Incorporated. Care instructions adapted under license by [employer] (which disclaims liability or warranty for this information). If you have questions about a medical condition or this instruction, always ask your healthcare professional. [address] any warranty or liability for your use of this information.

## 2017-09-07 NOTE — PROGRESS NOTES
Sumit Jackman is a 80 y.o. male and presents with Follow Up Chronic Condition       Subjective:    Stomatitis- doing better. Saw ENT and hearing aid recommended. Right posterior chest pain- off/on for about 5 years. Gets better with walking. No cough. No f/c. No radiation. No n/v. No other sx related to this. AAA- has f/u with cardiology next month. HTN- taking beta blocker . Assessment/Plan:      Stomatitis - resolved. Diagnoses and all orders for this visit:    1. Right-sided chest pain- follow for now. Pt will call if changes or worsens. Comments:  posterior    2. Abdominal aortic aneurysm without rupture ([address]- f/u with vascular discussed. 3. Essential hypertension- controlled. Low diastolic limits additional tx of systolic pressure. 4. Previous u/a with blood/protein- will recheck. RTC in 2 months    Orders Placed This Encounter    Influenza virus vaccine ([address]) 72 years and older ([address])    URINALYSIS W/ RFLX MICROSCOPIC     Standing Status:   Future     Number of Occurrences:   1     Standing Expiration Date:   9/8/2018    DISCONTD: clotrimazole (MYCELEX) 10 mg lidia     Sig: Take 10 mg by mouth five (5) times daily. ROS:  Negative except as mentioned above  Cardiac- no chest pain or palpitations  Pulmonary- no sob or wheezes  GI- no n/v or diarrhea. SH:  Social History   Substance Use Topics    Smoking status: Former Smoker     Years: 8.00     Quit date: 11/7/1953    Smokeless tobacco: Never Used    Alcohol use No         Medications/Allergies:  Current Outpatient Prescriptions on File Prior to Visit   Medication Sig Dispense Refill    atorvastatin (LIPITOR) 40 mg tablet Take 40 mg by mouth daily.  metoprolol tartrate (LOPRESSOR) 25 mg tablet Take 12.5 mg by mouth daily.  aspirin delayed-release 81 mg tablet Take 1 Tab by mouth daily. 90 Tab 3    pravastatin (PRAVACHOL) 40 mg tablet Take 1 Tab by mouth nightly.  90 Tab 3    cyanocobalamin (VITAMIN B-12) 1,000 mcg tablet Take 1 Tab by mouth daily. 90 Tab 3    calcium-cholecalciferol, D3, (CALCARB 600 WITH VITAMIN D) tablet Take 1 Tab by mouth two (2) times a day. 180 Tab 3    clotrimazole (LOTRIMIN) 1 % topical cream Apply  to affected area two (2) times a day. Use for 2 weeks. 30 g 1    albuterol (PROVENTIL HFA, VENTOLIN HFA, PROAIR HFA) 90 mcg/actuation inhaler Take 1 Puff by inhalation every four (4) hours as needed for Wheezing. 1 Inhaler 1     No current facility-administered medications on file prior to visit. Allergies   Allergen Reactions    Ace Inhibitors Cough    Codeine Other (comments)     headache    Effexor [Venlafaxine] Other (comments)     confusion    Fosamax [Alendronate] Other (comments)     peridontal pain    Procardia [Nifedipine] Swelling       Objective:  Visit Vitals    /42    Pulse 62    Temp 97.6 °F (36.4 °C) (Oral)    Resp 16    Ht 5' 6\" (1.676 m)    Wt 154 lb 6.4 oz (70 kg)    BMI 24.92 kg/m2    Body mass index is 24.92 kg/(m^2). Constitutional: Well developed, nourished, no distress, alert   CV: S1, S2.  RRR. No edema. Pulm: No abnormalities on inspection. Clear to auscultation bilaterally. No wheezing/rhonchi. Normal effort. GI: Soft, nontender, nondistended. Normal active bowel sounds. No  masses on palpation. No hepatosplenomegaly.

## 2017-09-07 NOTE — PROGRESS NOTES
1. Have you been to the ER, urgent care clinic since your last visit? Hospitalized since your last visit? No.     2. Have you seen or consulted any other health care providers outside of the [address] [place] [address] since your last visit? Include any pap smears or colon screening. No.     Patient presents with follow up tongue swelling. He stated that his tongue is a bit better but his taste buds is still bad.

## 2017-12-07 ENCOUNTER — OFFICE VISIT (OUTPATIENT)
Dept: FAMILY MEDICINE CLINIC | Age: 82
End: 2017-12-07

## 2017-12-07 VITALS
DIASTOLIC BLOOD PRESSURE: 41 MMHG | BODY MASS INDEX: 25.07 KG/M2 | RESPIRATION RATE: 16 BRPM | HEART RATE: 58 BPM | TEMPERATURE: 96.2 F | HEIGHT: 66 IN | WEIGHT: 156 LBS | SYSTOLIC BLOOD PRESSURE: 155 MMHG

## 2017-12-07 DIAGNOSIS — I25.10 CORONARY ARTERY DISEASE INVOLVING NATIVE CORONARY ARTERY OF NATIVE HEART WITHOUT ANGINA PECTORIS: ICD-10-CM

## 2017-12-07 DIAGNOSIS — I71.40 ABDOMINAL AORTIC ANEURYSM WITHOUT RUPTURE: Primary | ICD-10-CM

## 2017-12-07 DIAGNOSIS — J43.9 PULMONARY EMPHYSEMA, UNSPECIFIED EMPHYSEMA TYPE (HCC): ICD-10-CM

## 2017-12-07 RX ORDER — AMLODIPINE BESYLATE 5 MG/1
5 TABLET ORAL DAILY
COMMUNITY
End: 2018-09-25 | Stop reason: ALTCHOICE

## 2017-12-07 NOTE — MR AVS SNAPSHOT
Visit Information Date & Time Provider Department Dept. Phone Encounter #  
 12/7/2017  9:15 AM Reyna Ramos, [facility] [phone] [id number] Follow-up Instructions Return in about 2 months (around 2/7/2018) for 30 minute slot. Your Appointments 2/12/2018  9:00 AM  
Office Visit with Reyna Ramos MD  
[address]  
  
   
 [id number] [place] [address] [address] Upcoming Health Maintenance Date Due DTaP/Tdap/Td series (1 - Tdap) 11/14/1951 MEDICARE YEARLY EXAM 2/11/2018 GLAUCOMA SCREENING Q2Y 7/13/2019 Allergies as of 12/7/2017  Review Complete On: 12/7/2017 By: Reyna Ramos MD  
  
 Severity Noted Reaction Type Reactions Ace Inhibitors  11/29/2010    Cough Codeine  11/19/2010    Other (comments)  
 headache Effexor [Venlafaxine]  06/14/2017    Other (comments)  
 confusion Fosamax [Alendronate]  11/19/2010    Other (comments) peridontal pain Procardia [Nifedipine]  11/19/2010    Swelling Current Immunizations  Reviewed on 9/7/2017 Name Date Influenza High Dose Vaccine PF 9/7/2017, 11/15/2016 Influenza Vaccine 11/5/2014, 10/8/2013 Influenza Vaccine (Quad) 9/8/2015 12:00 PM  
 Pneumococcal Conjugate (PCV-13) 11/15/2016 ZZZ-RETIRED (DO NOT USE) Pneumococcal Vaccine (Unspecified Type) 11/7/2011 Not reviewed this visit You Were Diagnosed With   
  
 Codes Comments Abdominal aortic aneurysm without rupture (HCC)    -  Primary ICD-10-CM: I71.4 ICD-9-CM: 441.4 Coronary artery disease involving native coronary artery of native heart without angina pectoris     ICD-10-CM: I25.10 ICD-9-CM: 414.01 Vitals BP Pulse Temp Resp Height(growth percentile) Weight(growth percentile) 155/41 (!) 58 96.2 °F (35.7 °C) (Oral) 16 5' 6\" (1.676 m) 156 lb (70.8 kg) BMI Smoking Status 25.18 kg/m2 Former Smoker Vitals History BMI and BSA Data Body Mass Index Body Surface Area  
 25.18 kg/m 2 1.82 m 2 Preferred Pharmacy Pharmacy Name Phone [facility] [phone], [phone] [place] [address] [phone] Your Updated Medication List  
  
   
This list is accurate as of: 12/7/17  9:31 AM.  Always use your most recent med list.  
  
  
  
  
 albuterol 90 mcg/actuation inhaler Commonly known as:  PROVENTIL HFA, VENTOLIN HFA, PROAIR HFA Take 1 Puff by inhalation every four (4) hours as needed for Wheezing. amLODIPine 5 mg tablet Commonly known as:  Post Mend Take 5 mg by mouth daily. aspirin delayed-release 81 mg tablet Take 1 Tab by mouth daily. atorvastatin 40 mg tablet Commonly known as:  LIPITOR Take 40 mg by mouth daily. calcium-cholecalciferol (D3) tablet Commonly known as:  CALCARB 600 WITH VITAMIN D Take 1 Tab by mouth two (2) times a day. clotrimazole 1 % topical cream  
Commonly known as:  Debria Radha Apply  to affected area two (2) times a day. Use for 2 weeks. cyanocobalamin 1,000 mcg tablet Commonly known as:  VITAMIN B-12 Take 1 Tab by mouth daily. metoprolol tartrate 25 mg tablet Commonly known as:  LOPRESSOR Take 12.5 mg by mouth daily. pravastatin 40 mg tablet Commonly known as:  PRAVACHOL Take 1 Tab by mouth nightly. Follow-up Instructions Return in about 2 months (around 2/7/2018) for 30 minute slot. To-Do List   
 12/07/2017 Imaging:  CTA ABD Patient Instructions Abdominal Aortic Aneurysm: Care Instructions Your Care Instructions An abdominal aortic aneurysm is a stretched and bulging area of the aorta. The aorta is the large blood vessel that takes oxygen-rich blood from the heart to the rest of the body.  This type of aneurysm is in the belly, where the aorta takes blood to the lower body. If an aneurysm gets too big, it can cause serious problems. A bulging aorta is weak and can burst, or rupture. This causes life-threatening bleeding. If your doctor has determined that your aneurysm is small and not growing fast, it is safe to watch the aneurysm carefully and wait on surgery. If the aneurysm is larger, surgery may be the safest choice. In some cases, your doctor may be able to put in a type of graft, called a stent, to fix the aneurysm without doing major surgery. Follow-up care is a key part of your treatment and safety. Be sure to make and go to all appointments, and call your doctor if you are having problems. It's also a good idea to know your test results and keep a list of the medicines you take. How can you care for yourself at home? · Take your medicines exactly as prescribed. Call your doctor if you think you are having a problem with your medicine. You may take a medicine to lower your blood pressure. This lowers stress on your aorta. If you have high cholesterol, you also may take a statin medicine. · Follow a heart-healthy diet. ¨ Eat lots of fruits and vegetables, whole grains, fish, and low-fat or nonfat dairy foods. ¨ Eat lean meats. Limit saturated fat and avoid trans fat. ¨ Limit processed food, sodium, alcohol, and sweets. · If your doctor recommends it, get more exercise. Walking is a good choice. Bit by bit, increase the amount you walk every day. Try for at least 30 minutes on most days of the week. · Talk to your doctor about when you can do more active workouts. · Manage your weight. Being at a healthy weight will not likely change your aortic aneurysm, but it may lower the chance of complications if you ever need surgery. · Do not smoke or allow others to smoke around you. Smoking can make your condition worse.  If you need help quitting, talk to your doctor about stop-smoking programs and medicines. These can increase your chances of quitting for good. When should you call for help? Call 911 anytime you think you may need emergency care. For example, call if: 
? · You have severe pain in your belly, back, or chest.  
? · You passed out (lost consciousness). ? · You have severe trouble breathing. ?Call your doctor now or seek immediate medical care if: 
? · You are dizzy or lightheaded, or you feel like you may faint. ? · One or both feet change color, are painful, feel cool, or burn or tingle. ? Watch closely for changes in your health, and be sure to contact your doctor if you have any problems. Where can you learn more? Go to http://gwendolyn-gay.info/. Enter S248 in the search box to learn more about \"Abdominal Aortic Aneurysm: Care Instructions. \" Current as of: March 20, 2017 Content Version: 11.4 © 0216-6659 [employer]. Care instructions adapted under license by [employer] (which disclaims liability or warranty for this information). If you have questions about a medical condition or this instruction, always ask your healthcare professional. [place] [address] any warranty or liability for your use of this information. Introducing [facility]! [employer] introduces [employer] patient portal. Now you can access parts of your medical record, email your doctor's office, and request medication refills online. 1. In your internet browser, go to [email or web address] 2. Click on the First Time User? Click Here link in the Sign In box. You will see the New Member Sign Up page. 3. Enter your [employer] Access Code exactly as it appears below. You will not need to use this code after youve completed the sign-up process. If you do not sign up before the expiration date, you must request a new code. · [employer] Access Code: [email or web address] Expires: 3/7/2018  9:31 AM 
 
 4. Enter the last four digits of your Social Security Number (xxxx) and Date of Birth (mm/dd/yyyy) as indicated and click Submit. You will be taken to the next sign-up page. 5. Create a Imonomi ID. This will be your [employer] login ID and cannot be changed, so think of one that is secure and easy to remember. 6. Create a [employer] password. You can change your password at any time. 7. Enter your Password Reset Question and Answer. This can be used at a later time if you forget your password. 8. Enter your e-mail address. You will receive e-mail notification when new information is available in [address] 9. Click Sign Up. You can now view and download portions of your medical record. 10. Click the Download Summary menu link to download a portable copy of your medical information. If you have questions, please visit the Frequently Asked Questions section of the [employer] website. Remember, [employer] is NOT to be used for urgent needs. For medical emergencies, dial 911. Now available from your iPhone and Android! Please provide this summary of care documentation to your next provider. Your primary care clinician is listed as PAUL Medina. If you have any questions after today's visit, please call [phone].

## 2017-12-07 NOTE — PATIENT INSTRUCTIONS
Abdominal Aortic Aneurysm: Care Instructions  Your Care Instructions  An abdominal aortic aneurysm is a stretched and bulging area of the aorta. The aorta is the large blood vessel that takes oxygen-rich blood from the heart to the rest of the body. This type of aneurysm is in the belly, where the aorta takes blood to the lower body. If an aneurysm gets too big, it can cause serious problems. A bulging aorta is weak and can burst, or rupture. This causes life-threatening bleeding. If your doctor has determined that your aneurysm is small and not growing fast, it is safe to watch the aneurysm carefully and wait on surgery. If the aneurysm is larger, surgery may be the safest choice. In some cases, your doctor may be able to put in a type of graft, called a stent, to fix the aneurysm without doing major surgery. Follow-up care is a key part of your treatment and safety. Be sure to make and go to all appointments, and call your doctor if you are having problems. It's also a good idea to know your test results and keep a list of the medicines you take. How can you care for yourself at home? · Take your medicines exactly as prescribed. Call your doctor if you think you are having a problem with your medicine. You may take a medicine to lower your blood pressure. This lowers stress on your aorta. If you have high cholesterol, you also may take a statin medicine. · Follow a heart-healthy diet. ¨ Eat lots of fruits and vegetables, whole grains, fish, and low-fat or nonfat dairy foods. ¨ Eat lean meats. Limit saturated fat and avoid trans fat. ¨ Limit processed food, sodium, alcohol, and sweets. · If your doctor recommends it, get more exercise. Walking is a good choice. Bit by bit, increase the amount you walk every day. Try for at least 30 minutes on most days of the week. · Talk to your doctor about when you can do more active workouts. · Manage your weight.  Being at a healthy weight will not likely change your aortic aneurysm, but it may lower the chance of complications if you ever need surgery. · Do not smoke or allow others to smoke around you. Smoking can make your condition worse. If you need help quitting, talk to your doctor about stop-smoking programs and medicines. These can increase your chances of quitting for good. When should you call for help? Call 911 anytime you think you may need emergency care. For example, call if:  ? · You have severe pain in your belly, back, or chest.   ? · You passed out (lost consciousness). ? · You have severe trouble breathing. ?Call your doctor now or seek immediate medical care if:  ? · You are dizzy or lightheaded, or you feel like you may faint. ? · One or both feet change color, are painful, feel cool, or burn or tingle. ? Watch closely for changes in your health, and be sure to contact your doctor if you have any problems. Where can you learn more? Go to http://gwendolyn-gay.info/. Enter A424 in the search box to learn more about \"Abdominal Aortic Aneurysm: Care Instructions. \"  Current as of: March 20, 2017  Content Version: 11.4  © 5942-4312 [employer]. Care instructions adapted under license by [employer] (which disclaims liability or warranty for this information). If you have questions about a medical condition or this instruction, always ask your healthcare professional. [address] any warranty or liability for your use of this information. Body Mass Index: Care Instructions  Your Care Instructions    Body mass index (BMI) can help you see if your weight is raising your risk for health problems. It uses a formula to compare how much you weigh with how tall you are. · A BMI lower than 18.5 is considered underweight. · A BMI between 18.5 and 24.9 is considered healthy. · A BMI between 25 and 29.9 is considered overweight. A BMI of 30 or higher is considered obese.   If your BMI is in the normal range, it means that you have a lower risk for weight-related health problems. If your BMI is in the overweight or obese range, you may be at increased risk for weight-related health problems, such as high blood pressure, heart disease, stroke, arthritis or joint pain, and diabetes. If your BMI is in the underweight range, you may be at increased risk for health problems such as fatigue, lower protection (immunity) against illness, muscle loss, bone loss, hair loss, and hormone problems. BMI is just one measure of your risk for weight-related health problems. You may be at higher risk for health problems if you are not active, you eat an unhealthy diet, or you drink too much alcohol or use tobacco products. Follow-up care is a key part of your treatment and safety. Be sure to make and go to all appointments, and call your doctor if you are having problems. It's also a good idea to know your test results and keep a list of the medicines you take. How can you care for yourself at home? · Practice healthy eating habits. This includes eating plenty of fruits, vegetables, whole grains, lean protein, and low-fat dairy. · If your doctor recommends it, get more exercise. Walking is a good choice. Bit by bit, increase the amount you walk every day. Try for at least 30 minutes on most days of the week. · Do not smoke. Smoking can increase your risk for health problems. If you need help quitting, talk to your doctor about stop-smoking programs and medicines. These can increase your chances of quitting for good. · Limit alcohol to 2 drinks a day for men and 1 drink a day for women. Too much alcohol can cause health problems. If you have a BMI higher than 25  · Your doctor may do other tests to check your risk for weight-related health problems.  This may include measuring the distance around your waist. A waist measurement of more than 40 inches in men or 35 inches in women can increase the risk of weight-related health problems. · Talk with your doctor about steps you can take to stay healthy or improve your health. You may need to make lifestyle changes to lose weight and stay healthy, such as changing your diet and getting regular exercise. If you have a BMI lower than 18.5  · Your doctor may do other tests to check your risk for health problems. · Talk with your doctor about steps you can take to stay healthy or improve your health. You may need to make lifestyle changes to gain or maintain weight and stay healthy, such as getting more healthy foods in your diet and doing exercises to build muscle. Where can you learn more? Go to http://gwendolyn-gay.info/. Enter S176 in the search box to learn more about \"Body Mass Index: Care Instructions. \"  Current as of: October 13, 2016  Content Version: 11.4  © 5617-1372 [employer]. Care instructions adapted under license by [employer] (which disclaims liability or warranty for this information). If you have questions about a medical condition or this instruction, always ask your healthcare professional. [address] any warranty or liability for your use of this information.

## 2017-12-07 NOTE — PROGRESS NOTES
Loree Hernadez is a 80 y.o. male and presents with Follow Up Chronic Condition; Hypertension; and Abdominal Aortic Aneurysm       Subjective:    Right posterior chest pain- off/on for about 5 years. Gets better with walking. No cough. No f/c. No radiation. No n/v. No other sx related to this. CAD- saw cardiology. norvasc added  HTN- taking beta blocker . No cp or sob.  AAA- no abd pain. Last ct 1/2017.      Assessment/Plan:           Diagnoses and all orders for this visit:     1. Right-sided chest pain- resolved. 2. Abdominal aortic aneurysm without rupture (HCC)-4.5cm 1/2017-  recheck ct abd next month or two- f/u with vascular discussed-Pt unsure who this is however.      3. Essential hypertension- controlled. Low diastolic limits additional tx of systolic pressure. norvasc added at cardiology visit- pt had swelling on procardia- advised to monitor for this.      4. Previous u/a with blood/protein- normal on recheck. 5. CAD- stable- with aortic insuff- repeat echo planned by cardiology.        RTC in 2 months    Orders Placed This Encounter    CTA ABD     Standing Status:   Future     Standing Expiration Date:   1/7/2019     Scheduling Instructions:      Due in January 2018- pt requests Alice     Order Specific Question:   Is Patient Allergic to Contrast Dye? Answer:   No     Order Specific Question:   STAT Creatinine as indicated     Answer: Yes    amLODIPine (NORVASC) 5 mg tablet     Sig: Take 5 mg by mouth daily. Diagnoses and all orders for this visit:    1. Abdominal aortic aneurysm without rupture ([facility] [address]    2. Coronary artery disease involving native coronary artery of native heart without angina pectoris  -     CTA ABD; Future    3. Pulmonary emphysema, unspecified emphysema type ([address]              ROS:  Negative except as mentioned above  Cardiac- no chest pain or palpitations  Pulmonary- no sob or wheezes  GI- no n/v or diarrhea.       SH:  Social History   Substance Use Topics    Smoking status: Former Smoker     Years: 8.00     Quit date: 11/7/1953    Smokeless tobacco: Never Used    Alcohol use No         Medications/Allergies:  Current Outpatient Prescriptions on File Prior to Visit   Medication Sig Dispense Refill    metoprolol tartrate (LOPRESSOR) 25 mg tablet Take 12.5 mg by mouth daily.  aspirin delayed-release 81 mg tablet Take 1 Tab by mouth daily. 90 Tab 3    pravastatin (PRAVACHOL) 40 mg tablet Take 1 Tab by mouth nightly. 90 Tab 3    albuterol (PROVENTIL HFA, VENTOLIN HFA, PROAIR HFA) 90 mcg/actuation inhaler Take 1 Puff by inhalation every four (4) hours as needed for Wheezing. 1 Inhaler 1    cyanocobalamin (VITAMIN B-12) 1,000 mcg tablet Take 1 Tab by mouth daily. 90 Tab 3    calcium-cholecalciferol, D3, (CALCARB 600 WITH VITAMIN D) tablet Take 1 Tab by mouth two (2) times a day. 180 Tab 3    atorvastatin (LIPITOR) 40 mg tablet Take 40 mg by mouth daily.  clotrimazole (LOTRIMIN) 1 % topical cream Apply  to affected area two (2) times a day. Use for 2 weeks. 30 g 1     No current facility-administered medications on file prior to visit. Allergies   Allergen Reactions    Ace Inhibitors Cough    Codeine Other (comments)     headache    Effexor [Venlafaxine] Other (comments)     confusion    Fosamax [Alendronate] Other (comments)     peridontal pain    Procardia [Nifedipine] Swelling       Objective:  Visit Vitals    /41    Pulse (!) 58    Temp 96.2 °F (35.7 °C) (Oral)    Resp 16    Ht 5' 6\" (1.676 m)    Wt 156 lb (70.8 kg)    BMI 25.18 kg/m2    Body mass index is 25.18 kg/(m^2). Constitutional: Well developed, nourished, no distress, alert   CV: S1, S2.  RRR. No murmurs/rubs. No thrills palpated. No carotid bruits. Intact distal pulses. No edema. Pulm: No abnormalities on inspection. Clear to auscultation bilaterally. No wheezing/rhonchi. Normal effort. GI: Soft, nontender, nondistended. Normal active bowel sounds.  No masses on palpation. No hepatosplenomegaly. CT CTA CHEST, ABDOMEN1/20/2017  [facility]  Result Impression    OPTIONAL: (Y)   ASI BEGIN:   DATE OF EXAM: Jan 20 2017 3:03PM     [address] [place], [place] [address] - CTA ABDOMEN WWO CONT / ACCESSION # [id number]     PROCEDURE REASON: ABDOMINAL AORTIC ANEURYSM     * * * * Physician Interpretation * * * *     ([id number])(SERSLO) CTA ABDOMEN WWO CONT SEPARATE RESULT?()   START RESULTS:   RESULT: CTA of the chest, abdomen and pelvis dated 1/20/2017 3:03 PM     Comparison: 01/14/2016. History: 80year old Male with thoracoabdominal aortic aneurysm here for   follow-up evaluation and potential surgical planning. Technique: Multi-detector CT technology was employed (Siemens Definition   Flash dual source scanner). Flash mode imaging was performed following the   IV administration of contrast material. A low-osmolar contrast agent was   used (80 cc of Visipaque). CT Dose-Length Product (DLP): 327 mGycm   CT Dose Reduction Employed: Yes     For optimization of anatomic evaluation, multiplanar reconstruction,   maximum intensity projections, and advanced 3-D off-line postprocessing   were performed on a dedicated stand-alone workstation by the interpreting   physician. RESULT:   Potential study limitations: None. CHEST:   The chest wall is remarkable for median sternotomy wires from prior CABG,   as well as moderate kyphosis. There is no significant adenopathy noted in   the axillae, mediastinum, and killian. Multiple small calcified lymph nodes   in the right hilum. The pericardium and pulmonary arteries appear normal.     Lung windows reveal no acute abnormalities. There is no abnormal pulmonary   parenchymal mass, infiltrate, or pleural effusion. The cardiac chambers demonstrate normal atrioventricular and   ventriculoarterial concordance, and systemic and pulmonary venous return.    The cardiac chamber sizes are remarkable for mild left atrial enlargement   and moderate left ventricular enlargement (LVEDD = 6.5 cm). The coronary arteries have normal origins and courses. There are severe   coronary calcifications identified, though this study was not optimized   for coronary artery evaluation. VASCULAR WITH ADVANCED 3-D OFF-LINE POSTPROCESSING:   Aortic valve morphology is trileaflet, and free from calcifications. The thoracic aorta is tortuous, overall normal in caliber, except for mild   ectasia of the aortic root, and has mild mixed atherosclerotic changes of   the arch and descending segments. There is no acute aortic pathology, such   as dissection, intramural hematoma, or contained rupture. The arch vessel   branching pattern is bovine (i.e., common trunk of the innominate and left   common carotid artery). The abdominal aorta is again remarkable for a fusiform aneurysm of the   infrarenal segment, along with diffuse severe calcific atherosclerotic   changes. There is no acute aortic pathology. Representative dimensions of the thoracic aorta are as follows:   2.7 cm at the aortic annulus   4.2 cm at the sinuses of Valsalva measured sinus to sinus   (the sinotubular junction is preserved)   3.8 cm at the mid ascending aorta   3.6 cm at the distal ascending aorta   3.8 cm at the mid transverse arch   3.5 cm at the proximal descending thoracic aorta   2.8 cm at the diaphragmatic hiatus     The abdominal aorta measures:   2.8 cm at the supramesenteric segment   2.8 cm at the mesenteric segment   2.5 cm at the renal segment   4.5 cm at the mid infrarenal segment   2.2 cm at the aortic bifurcation     The celiac axis, SMA, and MATTHEW are patent. There are single renal arteries   bilaterally, both of which appear patent. 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      ABDOMEN:   The liver, gallbladder, and pancreas appear normal. The spleen is again   remarkable for multiple punctate calcifications, compatible old   granulomatous disease. The adrenal glands appear normal. Both kidneys are   normal in size, shape, and density, taking into consideration the   patient's advanced age; there is a moderate-sized simple appearing cyst of   the left interpolar region. There is no abnormal mass or hydronephrosis. PELVIS:   There is no significant retroperitoneal adenopathy. No free fluid or free   air within the abdomen or pelvis. The bowel appears unremarkable on this non-GI contrast examination, aside   from descending colonic diverticulosis. IMPRESSION:   No significant interval change compared to the prior examination. 1. Stable appearing, tortuous thoracic aorta with mild ectasia of the   aortic root (4.2 cm measured sinus to sinus). 2. Stable appearing infrarenal abdominal aortic aneurysm with a maximum   dimension of 4.5 cm.   3. 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 4. Old granulomatous disease. 5. Status post CABG. 6. Mild left atrial enlargement and moderate left ventricular enlargement   (LVEDD = 6.5 cm). Discussed the patient's BMI with him. The BMI follow up plan is as follows:     dietary management education, guidance, and counseling  encourage exercise  monitor weight  prescribed dietary intake    An After Visit Summary was printed and given to the patient.

## 2017-12-07 NOTE — PROGRESS NOTES
1. Have you been to the ER, urgent care clinic since your last visit? Hospitalized since your last visit? No.     2. Have you seen or consulted any other health care providers outside of the [address] [place] [address] since your last visit? Include any pap smears or colon screening. Yes, went to see Dr. Deepika Barron (cardio) in 10/2017.     Chief Complaint   Patient presents with    Follow Up Chronic Condition    Hypertension    Abdominal Aortic Aneurysm

## 2018-01-25 RX ORDER — ATORVASTATIN CALCIUM 40 MG/1
TABLET, FILM COATED ORAL
Qty: 30 TAB | Refills: 11 | Status: SHIPPED | OUTPATIENT
Start: 2018-01-25 | End: 2018-05-22 | Stop reason: SDUPTHER

## 2018-02-07 ENCOUNTER — OFFICE VISIT (OUTPATIENT)
Dept: FAMILY MEDICINE CLINIC | Age: 83
End: 2018-02-07

## 2018-02-07 ENCOUNTER — HOSPITAL ENCOUNTER (OUTPATIENT)
Dept: LAB | Age: 83
Discharge: HOME | End: 2018-02-07
Payer: MEDICARE

## 2018-02-07 VITALS
BODY MASS INDEX: 25.39 KG/M2 | WEIGHT: 158 LBS | HEIGHT: 66 IN | SYSTOLIC BLOOD PRESSURE: 158 MMHG | DIASTOLIC BLOOD PRESSURE: 49 MMHG | HEART RATE: 67 BPM | RESPIRATION RATE: 16 BRPM | TEMPERATURE: 96.8 F

## 2018-02-07 DIAGNOSIS — E78.2 MIXED HYPERLIPIDEMIA: ICD-10-CM

## 2018-02-07 DIAGNOSIS — E53.8 B12 DEFICIENCY: ICD-10-CM

## 2018-02-07 DIAGNOSIS — J43.9 PULMONARY EMPHYSEMA, UNSPECIFIED EMPHYSEMA TYPE (HCC): ICD-10-CM

## 2018-02-07 DIAGNOSIS — I71.40 ABDOMINAL AORTIC ANEURYSM WITHOUT RUPTURE: Primary | ICD-10-CM

## 2018-02-07 DIAGNOSIS — I25.10 CORONARY ARTERY DISEASE INVOLVING NATIVE CORONARY ARTERY OF NATIVE HEART WITHOUT ANGINA PECTORIS: ICD-10-CM

## 2018-02-07 DIAGNOSIS — I71.40 ABDOMINAL AORTIC ANEURYSM WITHOUT RUPTURE: ICD-10-CM

## 2018-02-07 LAB
ALBUMIN SERPL-MCNC: 3.6 G/DL (ref 3.4–5)
ALBUMIN/GLOB SERPL: 1.2 {RATIO} (ref 0.8–1.7)
ALP SERPL-CCNC: 91 U/L (ref 45–117)
ALT SERPL-CCNC: 11 U/L (ref 16–61)
ANION GAP SERPL CALC-SCNC: 7 MMOL/L (ref 3–18)
AST SERPL-CCNC: 17 U/L (ref 15–37)
BILIRUB SERPL-MCNC: 0.5 MG/DL (ref 0.2–1)
BUN SERPL-MCNC: 21 MG/DL (ref 7–18)
BUN/CREAT SERPL: 16 (ref 12–20)
CALCIUM SERPL-MCNC: 8.5 MG/DL (ref 8.5–10.1)
CHLORIDE SERPL-SCNC: 103 MMOL/L (ref 100–108)
CHOLEST SERPL-MCNC: 156 MG/DL
CO2 SERPL-SCNC: 28 MMOL/L (ref 21–32)
CREAT SERPL-MCNC: 1.29 MG/DL (ref 0.6–1.3)
ERYTHROCYTE [DISTWIDTH] IN BLOOD BY AUTOMATED COUNT: 12.4 % (ref 11.6–14.5)
GLOBULIN SER CALC-MCNC: 2.9 G/DL (ref 2–4)
GLUCOSE SERPL-MCNC: 80 MG/DL (ref 74–99)
HCT VFR BLD AUTO: 40.9 % (ref 36–48)
HDLC SERPL-MCNC: 62 MG/DL (ref 40–60)
HDLC SERPL: 2.5 {RATIO} (ref 0–5)
HGB BLD-MCNC: 13.5 G/DL (ref 13–16)
LDLC SERPL CALC-MCNC: 76.4 MG/DL (ref 0–100)
LIPID PROFILE,FLP: ABNORMAL
MCH RBC QN AUTO: 31.6 PG (ref 24–34)
MCHC RBC AUTO-ENTMCNC: 33 G/DL (ref 31–37)
MCV RBC AUTO: 95.8 FL (ref 74–97)
PLATELET # BLD AUTO: 172 K/UL (ref 135–420)
PMV BLD AUTO: 9.6 FL (ref 9.2–11.8)
POTASSIUM SERPL-SCNC: 4.2 MMOL/L (ref 3.5–5.5)
PROT SERPL-MCNC: 6.5 G/DL (ref 6.4–8.2)
RBC # BLD AUTO: 4.27 M/UL (ref 4.7–5.5)
SODIUM SERPL-SCNC: 138 MMOL/L (ref 136–145)
TRIGL SERPL-MCNC: 88 MG/DL (ref ?–150)
VIT B12 SERPL-MCNC: 674 PG/ML (ref 211–911)
VLDLC SERPL CALC-MCNC: 17.6 MG/DL
WBC # BLD AUTO: 11.2 K/UL (ref 4.6–13.2)

## 2018-02-07 PROCEDURE — 82607 VITAMIN B-12: CPT | Performed by: INTERNAL MEDICINE

## 2018-02-07 PROCEDURE — 85027 COMPLETE CBC AUTOMATED: CPT | Performed by: INTERNAL MEDICINE

## 2018-02-07 PROCEDURE — 80053 COMPREHEN METABOLIC PANEL: CPT | Performed by: INTERNAL MEDICINE

## 2018-02-07 PROCEDURE — 80061 LIPID PANEL: CPT | Performed by: INTERNAL MEDICINE

## 2018-02-07 PROCEDURE — 36415 COLL VENOUS BLD VENIPUNCTURE: CPT | Performed by: INTERNAL MEDICINE

## 2018-02-07 NOTE — PROGRESS NOTES
Kiera Maxwell is a 80 y.o. male and presents with Follow Up Chronic Condition; Hypertension; Abdominal Aortic Aneurysm; and Coronary Artery Disease       Subjective:    AAA- has follow CT planned by cardiology pt reports for April. No abd or back pain. Osteoporosis- no back pain.   htn- no cp- taking all meds. No change of edema (chronic on left)  Emphysema- no increase sob. No wheezing. b12 deficiency- taking supplement. Assessment/Plan:    AAA- pt will keep CT f/u. Discussed sx to monitor for. Osteoporosis- again discussed tx and pathophysiology. Pt again does not want tx for this.   htn- permissive elevation of sbp given low diastolic. No changes. Emphysema- stable - no changes. b12- recheck level. RTC in 4 months. Orders Placed This Encounter    LIPID PANEL     Standing Status:   Future     Standing Expiration Date:   3/1/5532    METABOLIC PANEL, COMPREHENSIVE     Standing Status:   Future     Standing Expiration Date:   2019    VITAMIN B12     Standing Status:   Future     Standing Expiration Date:   2019    CBC W/O DIFF     Standing Status:   Future     Standing Expiration Date:   2019    varicella-zoster recombinant, PF, (SHINGRIX, PF,) 50 mcg/0.5 mL susr injection     Si.5 mL by IntraMUSCular route once for 1 dose. Dispense:  0.5 mL     Refill:  0    varicella-zoster recombinant, PF, (SHINGRIX, PF,) 50 mcg/0.5 mL susr injection     Si.5 mL by IntraMUSCular route once for 1 dose. To be done 2-6 months after initial vaccination. Dispense:  0.5 mL     Refill:  0       Diagnoses and all orders for this visit:    1. Abdominal aortic aneurysm without rupture (HCC)  -     CBC W/O DIFF; Future    2. Pulmonary emphysema, unspecified emphysema type ([facility] [address]  -     CBC W/O DIFF; Future    3. Coronary artery disease involving native coronary artery of native heart without angina pectoris  -     METABOLIC PANEL, COMPREHENSIVE; Future  -     CBC W/O DIFF; Future    4. B12 deficiency  -     VITAMIN B12; Future    5. Mixed hyperlipidemia  -     LIPID PANEL; Future  -     METABOLIC PANEL, COMPREHENSIVE; Future    Other orders  -     varicella-zoster recombinant, PF, (SHINGRIX, PF,) 50 mcg/0.5 mL susr injection; 0.5 mL by IntraMUSCular route once for 1 dose.  -     varicella-zoster recombinant, PF, (SHINGRIX, PF,) 50 mcg/0.5 mL susr injection; 0.5 mL by IntraMUSCular route once for 1 dose. To be done 2-6 months after initial vaccination. ROS:  Negative except as mentioned above  Cardiac- no chest pain or palpitations  Pulmonary- no sob or wheezes  GI- no n/v or diarrhea. SH:  Social History   Substance Use Topics    Smoking status: Former Smoker     Years: 8.00     Quit date: 11/7/1953    Smokeless tobacco: Never Used    Alcohol use No         Medications/Allergies:  Current Outpatient Prescriptions on File Prior to Visit   Medication Sig Dispense Refill    atorvastatin (LIPITOR) 40 mg tablet take 1 tablet by mouth once daily 30 Tab 11    amLODIPine (NORVASC) 5 mg tablet Take 5 mg by mouth daily.  clotrimazole (LOTRIMIN) 1 % topical cream Apply  to affected area two (2) times a day. Use for 2 weeks. 30 g 1    metoprolol tartrate (LOPRESSOR) 25 mg tablet Take 12.5 mg by mouth daily.  aspirin delayed-release 81 mg tablet Take 1 Tab by mouth daily. 90 Tab 3    albuterol (PROVENTIL HFA, VENTOLIN HFA, PROAIR HFA) 90 mcg/actuation inhaler Take 1 Puff by inhalation every four (4) hours as needed for Wheezing. 1 Inhaler 1    cyanocobalamin (VITAMIN B-12) 1,000 mcg tablet Take 1 Tab by mouth daily. 90 Tab 3    calcium-cholecalciferol, D3, (CALCARB 600 WITH VITAMIN D) tablet Take 1 Tab by mouth two (2) times a day. 180 Tab 3    pravastatin (PRAVACHOL) 40 mg tablet Take 1 Tab by mouth nightly. 90 Tab 3     No current facility-administered medications on file prior to visit.            Allergies   Allergen Reactions    Ace Inhibitors Cough    Codeine Other (comments)     headache    Effexor [Venlafaxine] Other (comments)     confusion    Fosamax [Alendronate] Other (comments)     peridontal pain    Procardia [Nifedipine] Swelling       Objective:  Visit Vitals    /49    Pulse 67    Temp 96.8 °F (36 °C) (Oral)    Resp 16    Ht 5' 6\" (1.676 m)    Wt 158 lb (71.7 kg)    BMI 25.5 kg/m2    Body mass index is 25.5 kg/(m^2). Constitutional: Well developed, nourished, no distress, alert   CV: S1, S2.  RRR. No murmurs/rubs. No thrills palpated. No carotid bruits. Intact distal pulses. 1+ LE pitting edema. Pulm: No abnormalities on inspection. Clear to auscultation bilaterally. No wheezing/rhonchi. Normal effort. GI: Soft, nontender, nondistended. Normal active bowel sounds. No  masses on palpation. No hepatosplenomegaly.

## 2018-02-07 NOTE — MR AVS SNAPSHOT
Shelley De Anda [place] [address] [facility] [place] [id number] 
[phone] Patient: Hermann Chapman MRN: [id number] PKQ:[id number] Visit Information Date & Time Provider Department Dept. Phone Encounter #  
 2/7/2018 10:30 AM Jimy Garcia, [address] [place] [address] [phone] [id number] Follow-up Instructions Return in about 4 months (around 6/7/2018) for 30 minute slot. Your Appointments 2/12/2018  9:00 AM  
Office Visit with Jimy Garcia MD  
[facility] [address] [place] [address] [place] Upcoming Health Maintenance Date Due DTaP/Tdap/Td series (1 - Tdap) 11/14/1951 MEDICARE YEARLY EXAM 2/11/2018 GLAUCOMA SCREENING Q2Y 7/13/2019 Allergies as of 2/7/2018  Review Complete On: 2/7/2018 By: Jimy Garcia MD  
  
 Severity Noted Reaction Type Reactions Ace Inhibitors  11/29/2010    Cough Codeine  11/19/2010    Other (comments)  
 headache Effexor [Venlafaxine]  06/14/2017    Other (comments)  
 confusion Fosamax [Alendronate]  11/19/2010    Other (comments) peridontal pain Procardia [Nifedipine]  11/19/2010    Swelling Current Immunizations  Reviewed on 9/7/2017 Name Date Influenza High Dose Vaccine PF 9/7/2017, 11/15/2016 Influenza Vaccine 11/5/2014, 10/8/2013 Influenza Vaccine (Quad) 9/8/2015 12:00 PM  
 Pneumococcal Conjugate (PCV-13) 11/15/2016 ZZZ-RETIRED (DO NOT USE) Pneumococcal Vaccine (Unspecified Type) 11/7/2011 Not reviewed this visit You Were Diagnosed With   
  
 Codes Comments Abdominal aortic aneurysm without rupture (HCC)    -  Primary ICD-10-CM: I71.4 ICD-9-CM: 441.4 Pulmonary emphysema, unspecified emphysema type ([facility]     ICD-10-CM: J43.9 ICD-9-CM: 492.8 Coronary artery disease involving native coronary artery of native heart without angina pectoris     ICD-10-CM: I25.10 ICD-9-CM: 414.01   
 B12 deficiency     ICD-10-CM: E53.8 ICD-9-CM: 266.2 Mixed hyperlipidemia     ICD-10-CM: E78.2 ICD-9-CM: 272.2 Vitals BP Pulse Temp Resp Height(growth percentile) Weight(growth percentile) 158/49 67 96.8 °F (36 °C) (Oral) 16 5' 6\" (1.676 m) 158 lb (71.7 kg) BMI Smoking Status 25.5 kg/m2 Former Smoker BMI and BSA Data Body Mass Index Body Surface Area 25.5 kg/m 2 1.83 m 2 Preferred Pharmacy Pharmacy Name Phone [facility], [address] [place] [address] [phone] Your Updated Medication List  
  
   
This list is accurate as of: 2/7/18 10:52 AM.  Always use your most recent med list.  
  
  
  
  
 albuterol 90 mcg/actuation inhaler Commonly known as:  PROVENTIL HFA, VENTOLIN HFA, PROAIR HFA Take 1 Puff by inhalation every four (4) hours as needed for Wheezing. amLODIPine 5 mg tablet Commonly known as:  Tylene Josue Take 5 mg by mouth daily. aspirin delayed-release 81 mg tablet Take 1 Tab by mouth daily. atorvastatin 40 mg tablet Commonly known as:  LIPITOR  
take 1 tablet by mouth once daily  
  
 calcium-cholecalciferol (D3) tablet Commonly known as:  CALCARB 600 WITH VITAMIN D Take 1 Tab by mouth two (2) times a day. clotrimazole 1 % topical cream  
Commonly known as:  Arlina Matter Apply  to affected area two (2) times a day. Use for 2 weeks. cyanocobalamin 1,000 mcg tablet Commonly known as:  VITAMIN B-12 Take 1 Tab by mouth daily. metoprolol tartrate 25 mg tablet Commonly known as:  LOPRESSOR Take 12.5 mg by mouth daily. * varicella-zoster recombinant (PF) 50 mcg/0.5 mL Susr injection Commonly known as:  SHINGRIX (PF)  
0.5 mL by IntraMUSCular route once for 1 dose. * varicella-zoster recombinant (PF) 50 mcg/0.5 mL Susr injection Commonly known as:  SHINGRIX (PF)  
0.5 mL by IntraMUSCular route once for 1 dose. To be done 2-6 months after initial vaccination. * Notice: This list has 2 medication(s) that are the same as other medications prescribed for you. Read the directions carefully, and ask your doctor or other care provider to review them with you. Prescriptions Printed Refills  
 varicella-zoster recombinant, PF, (SHINGRIX, PF,) 50 mcg/0.5 mL susr injection 0 Si.5 mL by IntraMUSCular route once for 1 dose. Class: Print Route: IntraMUSCular  
 varicella-zoster recombinant, PF, (SHINGRIX, PF,) 50 mcg/0.5 mL susr injection 0 Si.5 mL by IntraMUSCular route once for 1 dose. To be done 2-6 months after initial vaccination. Class: Print Route: IntraMUSCular Follow-up Instructions Return in about 4 months (around 2018) for 30 minute slot. To-Do List   
 2018 Lab:  CBC W/O DIFF   
  
 2018 Lab:  LIPID PANEL   
  
 2018 Lab:  METABOLIC PANEL, COMPREHENSIVE   
  
 2018 Lab:  VITAMIN B12 Introducing [facility]! [employer] introduces [employer] patient portal. Now you can access parts of your medical record, email your doctor's office, and request medication refills online. 1. In your internet browser, go to [email or web address]. [email or web address] 2. Click on the First Time User? Click Here link in the Sign In box. You will see the New Member Sign Up page. 3. Enter your [employer] Access Code exactly as it appears below. You will not need to use this code after youve completed the sign-up process. If you do not sign up before the expiration date, you must request a new code. · [employer] Access Code: [email or web address] Expires: 3/7/2018  9:31 AM 
 
4.  Enter the last four digits of your Social Security Number (xxxx) and Date of Birth (mm/dd/yyyy) as indicated and click Submit. You will be taken to the next sign-up page. 5. Create a [employer] ID. This will be your [employer] login ID and cannot be changed, so think of one that is secure and easy to remember. 6. Create a [employer] password. You can change your password at any time. 7. Enter your Password Reset Question and Answer. This can be used at a later time if you forget your password. 8. Enter your e-mail address. You will receive e-mail notification when new information is available in [address] 9. Click Sign Up. You can now view and download portions of your medical record. 10. Click the Download Summary menu link to download a portable copy of your medical information. If you have questions, please visit the Frequently Asked Questions section of the [employer] website. Remember, [employer] is NOT to be used for urgent needs. For medical emergencies, dial 911. Now available from your iPhone and Android! Please provide this summary of care documentation to your next provider. Your primary care clinician is listed as PAUL Medina. If you have any questions after today's visit, please call [phone].

## 2018-02-07 NOTE — PROGRESS NOTES
1. Have you been to the ER, urgent care clinic since your last visit? Hospitalized since your last visit? No.     2. Have you seen or consulted any other health care providers outside of the [facility] since your last visit? Include any pap smears or colon screening.  No.     Chief Complaint   Patient presents with    Follow Up Chronic Condition    Hypertension    Abdominal Aortic Aneurysm    Coronary Artery Disease

## 2018-02-22 ENCOUNTER — OFFICE VISIT (OUTPATIENT)
Dept: FAMILY MEDICINE CLINIC | Age: 83
End: 2018-02-22

## 2018-02-22 VITALS
HEART RATE: 69 BPM | TEMPERATURE: 96.2 F | SYSTOLIC BLOOD PRESSURE: 147 MMHG | DIASTOLIC BLOOD PRESSURE: 40 MMHG | HEIGHT: 66 IN | BODY MASS INDEX: 25.46 KG/M2 | RESPIRATION RATE: 16 BRPM | WEIGHT: 158.4 LBS

## 2018-02-22 DIAGNOSIS — Z13.31 SCREENING FOR DEPRESSION: ICD-10-CM

## 2018-02-22 DIAGNOSIS — Z00.00 MEDICARE ANNUAL WELLNESS VISIT, SUBSEQUENT: ICD-10-CM

## 2018-02-22 DIAGNOSIS — Z13.39 SCREENING FOR ALCOHOLISM: ICD-10-CM

## 2018-02-22 DIAGNOSIS — Z23 NEED FOR SHINGLES VACCINE: Primary | ICD-10-CM

## 2018-02-22 NOTE — PROGRESS NOTES
This is a Subsequent Medicare Annual Wellness Exam (AWV) (Performed 12 months after IPPE or effective date of Medicare Part B enrollment)    I have reviewed the patient's medical history in detail and updated the computerized patient record. History     Past Medical History:   Diagnosis Date    AAA (abdominal aortic aneurysm) ([facility]     infrarenal    B12 deficiency 7/2009    261    CAD (coronary artery disease)     Denis    Diverticulosis of colon 2009    sigmoid    Fatty liver 2009    Hearing loss     aides    HTN (hypertension)     Hyperlipidemia     Hypogonadism male     Inguinal hernia     right    Osteoporosis 2006    comp fx T6,7, L1, 2009 fx T8,9, right 10th rib    Renal cyst, right 2009      Past Surgical History:   Procedure Laterality Date    HX CORONARY ARTERY BYPASS GRAFT      HX HERNIA REPAIR      left     Current Outpatient Prescriptions   Medication Sig Dispense Refill    atorvastatin (LIPITOR) 40 mg tablet take 1 tablet by mouth once daily 30 Tab 11    amLODIPine (NORVASC) 5 mg tablet Take 5 mg by mouth daily.  clotrimazole (LOTRIMIN) 1 % topical cream Apply  to affected area two (2) times a day. Use for 2 weeks. 30 g 1    metoprolol tartrate (LOPRESSOR) 25 mg tablet Take 12.5 mg by mouth daily.  aspirin delayed-release 81 mg tablet Take 1 Tab by mouth daily. 90 Tab 3    albuterol (PROVENTIL HFA, VENTOLIN HFA, PROAIR HFA) 90 mcg/actuation inhaler Take 1 Puff by inhalation every four (4) hours as needed for Wheezing. 1 Inhaler 1    cyanocobalamin (VITAMIN B-12) 1,000 mcg tablet Take 1 Tab by mouth daily. 90 Tab 3    calcium-cholecalciferol, D3, (CALCARB 600 WITH VITAMIN D) tablet Take 1 Tab by mouth two (2) times a day.  180 Tab 3     Allergies   Allergen Reactions    Ace Inhibitors Cough    Codeine Other (comments)     headache    Effexor [Venlafaxine] Other (comments)     confusion    Fosamax [Alendronate] Other (comments)     peridontal pain    Procardia [Nifedipine] Swelling     Family History   Problem Relation Age of Onset    Cancer Mother     Stroke Mother     Heart Disease Father      Social History   Substance Use Topics    Smoking status: Former Smoker     Years: 8.00     Quit date: 11/7/1953    Smokeless tobacco: Never Used    Alcohol use No     Patient Active Problem List   Diagnosis Code    CAD (coronary artery disease) I25.10    Hyperlipidemia E78.5    B12 deficiency E53.8    Renal cyst, right N28.1    Fatty liver K76.0    Inguinal hernia K40.90    Diverticulosis of colon K57.30    Hypogonadism male E29.1    Osteoporosis M81.0    Colon cancer screening - patient declines Z12.11    Aortic insufficiency I35.1    Basal cell carcinoma C44.91    Pulmonary nodule, right R91.1    Essential hypertension I10    Advanced care planning/counseling discussion Z71.89    Abdominal aortic aneurysm without rupture ([facility] I71.4    Emphysema of lung ([facility] J43.9       Depression Risk Factor Screening:     PHQ over the last two weeks 2/22/2018   Little interest or pleasure in doing things Not at all   Feeling down, depressed or hopeless Not at all   Total Score PHQ 2 0     Alcohol Risk Factor Screening: You do not drink alcohol or very rarely. Functional Ability and Level of Safety:   Hearing Loss  Hearing is good. Activities of Daily Living  The home contains: handrails  Patient does total self care    Fall Risk  Fall Risk Assessment, last 12 mths 2/22/2018   Able to walk? Yes   Fall in past 12 months? Yes   Fall with injury?  No   Number of falls in past 12 months 1   Fall Risk Score 1   was on stool closing vents    Abuse Screen  Patient is not abused    Cognitive Screening   Evaluation of Cognitive Function:  Has your family/caregiver stated any concerns about your memory: no  Normal    Patient Care Team   Patient Care Team:  Armando Charles MD as PCP - General (Internal Medicine)  Arash Joel MD (Dermatology)  Martita Durán MD (Otolaryngology)  Madyson Orr MD (Cardiology)  Yessica Gore, RN as Ambulatory Care Navigator    Assessment/Plan   Education and counseling provided:  Are appropriate based on today's review and evaluation  End-of-Life planning (with patient's consent)    Diagnoses and all orders for this visit:    1. Need for shingles vaccine    2. Medicare annual wellness visit, subsequent    3. Screening for alcoholism  -     Annual  Alcohol Screen 15 min ()    4. Screening for depression  -     Depression Screen Annual    Other orders  -     varicella-zoster recombinant, PF, (SHINGRIX, PF,) 50 mcg/0.5 mL susr injection; 0.5 mL by IntraMUSCular route once for 1 dose.  -     varicella-zoster recombinant, PF, (SHINGRIX, PF,) 50 mcg/0.5 mL susr injection; 0.5 mL by IntraMUSCular route once for 1 dose. To be done 2-6 months after initial vaccination        Health Maintenance Due   Topic Date Due    DTaP/Tdap/Td series (1 - Tdap) 11/14/1951    MEDICARE YEARLY EXAM  02/11/2018       acp plan discussed - given form to complete as well.

## 2018-02-22 NOTE — PROGRESS NOTES
1. Have you been to the ER, urgent care clinic since your last visit? Hospitalized since your last visit? No.     2. Have you seen or consulted any other health care providers outside of the [address] [place] [address] since your last visit? Include any pap smears or colon screening.  No    Chief Complaint   Patient presents with   [facility]

## 2018-02-22 NOTE — MR AVS SNAPSHOT
Shelley De Anda Lima [address] [facility] [place] [address] [id number] 
[phone] Patient: Berta Morrsi MRN: [id number] Visit Information Date & Time Provider Department Dept. Phone Encounter #  
 2/22/2018  9:00 AM Anita Porras, [address] [place] [address] [phone] [id number] Follow-up Instructions Return if symptoms worsen or fail to improve. Your Appointments 6/7/2018  9:30 AM  
Follow Up with Anita Porras MD  
[place] [address] ([address] [facility]) Appt Note: 4 mo f/u  
 [facility] [place] [address]  
  
   
 [address] [place] [address] [address] Upcoming Health Maintenance Date Due DTaP/Tdap/Td series (1 - Tdap) 11/14/1951 MEDICARE YEARLY EXAM 2/11/2018 GLAUCOMA SCREENING Q2Y 7/13/2019 Allergies as of 2/22/2018  Review Complete On: 2/22/2018 By: Anita Porras MD  
  
 Severity Noted Reaction Type Reactions Ace Inhibitors  11/29/2010    Cough Codeine  11/19/2010    Other (comments)  
 headache Effexor [Venlafaxine]  06/14/2017    Other (comments)  
 confusion Fosamax [Alendronate]  11/19/2010    Other (comments) peridontal pain Procardia [Nifedipine]  11/19/2010    Swelling Current Immunizations  Reviewed on 9/7/2017 Name Date Influenza High Dose Vaccine PF 9/7/2017, 11/15/2016 Influenza Vaccine 11/5/2014, 10/8/2013 Influenza Vaccine (Quad) 9/8/2015 12:00 PM  
 Pneumococcal Conjugate (PCV-13) 11/15/2016 ZZZ-RETIRED (DO NOT USE) Pneumococcal Vaccine (Unspecified Type) 11/7/2011 Not reviewed this visit You Were Diagnosed With   
  
 Codes Comments Need for shingles vaccine    -  Primary ICD-10-CM: B72 ICD-9-CM: V04.89 Medicare annual wellness visit, subsequent     ICD-10-CM: Z00.00 ICD-9-CM: V70.0 Screening for alcoholism     ICD-10-CM: Z13.89 ICD-9-CM: V79.1 Screening for depression     ICD-10-CM: Z13.89 ICD-9-CM: V79.0 Vitals BP Pulse Temp Resp Height(growth percentile) Weight(growth percentile) 147/40 69 96.2 °F (35.7 °C) (Oral) 16 5' 6\" (1.676 m) 158 lb 6.4 oz (71.8 kg) BMI Smoking Status 25.57 kg/m2 Former Smoker BMI and BSA Data Body Mass Index Body Surface Area 25.57 kg/m 2 1.83 m 2 Preferred Pharmacy Pharmacy Name Phone [facility] [address] [place] [address], [address] [facility] [address] [facility] [phone] Your Updated Medication List  
  
   
This list is accurate as of 2/22/18  9:15 AM.  Always use your most recent med list.  
  
  
  
  
 albuterol 90 mcg/actuation inhaler Commonly known as:  PROVENTIL HFA, VENTOLIN HFA, PROAIR HFA Take 1 Puff by inhalation every four (4) hours as needed for Wheezing. amLODIPine 5 mg tablet Commonly known as:  Angel Free Take 5 mg by mouth daily. aspirin delayed-release 81 mg tablet Take 1 Tab by mouth daily. atorvastatin 40 mg tablet Commonly known as:  LIPITOR  
take 1 tablet by mouth once daily  
  
 calcium-cholecalciferol (D3) tablet Commonly known as:  CALCARB 600 WITH VITAMIN D Take 1 Tab by mouth two (2) times a day. clotrimazole 1 % topical cream  
Commonly known as:  Celestia [place] Apply  to affected area two (2) times a day. Use for 2 weeks. cyanocobalamin 1,000 mcg tablet Commonly known as:  VITAMIN B-12 Take 1 Tab by mouth daily. metoprolol tartrate 25 mg tablet Commonly known as:  LOPRESSOR Take 12.5 mg by mouth daily. * varicella-zoster recombinant (PF) 50 mcg/0.5 mL Susr injection Commonly known as:  SHINGRIX (PF)  
0.5 mL by IntraMUSCular route once for 1 dose. * varicella-zoster recombinant (PF) 50 mcg/0.5 mL Susr injection Commonly known as:  SHINGRIX (PF)  
0.5 mL by IntraMUSCular route once for 1 dose. To be done 2-6 months after initial vaccination * Notice: This list has 2 medication(s) that are the same as other medications prescribed for you. Read the directions carefully, and ask your doctor or other care provider to review them with you. Prescriptions Printed Refills  
 varicella-zoster recombinant, PF, (SHINGRIX, PF,) 50 mcg/0.5 mL susr injection 0 Si.5 mL by IntraMUSCular route once for 1 dose. Class: Print Route: IntraMUSCular  
 varicella-zoster recombinant, PF, (SHINGRIX, PF,) 50 mcg/0.5 mL susr injection 0 Si.5 mL by IntraMUSCular route once for 1 dose. To be done 2-6 months after initial vaccination Class: Print Route: IntraMUSCular We Performed the Following [place] [address] [[id number] [facility]] [place] ANNUAL ALCOHOL SCREEN 15 MIN [id number] [facility]] Follow-up Instructions Return if symptoms worsen or fail to improve. Patient Instructions Medicare Wellness Visit, Male The best way to live healthy is to have a healthy lifestyle by eating a well-balanced diet, exercising regularly, limiting alcohol and stopping smoking. Regular physical exams and screening tests are another way to keep healthy. Preventive exams provided by your health care provider can find health problems before they become diseases or illnesses. Preventive services including immunizations, screening tests, monitoring and exams can help you take care of your own health. All people over age 72 should have a pneumovax  and and a prevnar shot to prevent pneumonia. These are once in a lifetime unless you and your provider decide differently. All people over 65 should have a yearly flu shot and a tetanus vaccine every 10 years. Screening for diabetes mellitus with a blood sugar test should be done every year.  
 
Glaucoma is a disease of the eye due to increased ocular pressure that can lead to blindness and it should be done every year by an eye professional. 
 
 Cardiovascular screening tests that check for elevated lipids (fatty part of blood) which can lead to heart disease and strokes should be done every 5 years. Colorectal screening that evaluates for blood or polyps in your colon should be done yearly as a stool test or every five years as a flexible sigmoidoscope or every 10 years as a colonoscopy up to age 76. Men up to age 76 may need a screening blood test for prostate cancer at certain intervals, depending on their personal and family history. This decision is between the patient and his provider. If you have been a smoker or had family history of abdominal aortic aneurysms, you and your provider may decide to schedule an ultrasound test of your aorta. Hepatitis C screening is also recommended for anyone born between [birth year] through [address]. A shingles vaccine is also recommended once in a lifetime after age 61. Your Medicare Wellness Exam is recommended annually. Here is a list of your current Health Maintenance items with a due date: 
Health Maintenance Due Topic Date Due  
 DTaP/Tdap/Td  (1 - Tdap) 11/14/1951 Darrius Pierre Annual Well Visit  02/11/2018 Advance Care Planning: Care Instructions Your Care Instructions It can be hard to live with an illness that cannot be cured. But if your health is getting worse, you may want to make decisions about end-of-life care. Planning for the end of your life does not mean that you are giving up. It is a way to make sure that your wishes are met. Clearly stating your wishes can make it easier for your loved ones. Making plans while you are still able may also ease your mind and make your final days less stressful and more meaningful. Follow-up care is a key part of your treatment and safety. Be sure to make and go to all appointments, and call your doctor if you are having problems. It's also a good idea to know your test results and keep a list of the medicines you take. What can you do to plan for the end of life? · You can bring these issues up with your doctor. You do not need to wait until your doctor starts the conversation. You might start with \"I would not be willing to live with . Ember Crawford \" When you complete this sentence it helps your doctor understand your wishes. · Talk openly and honestly with your doctor. This is the best way to understand the decisions you will need to make as your health changes. Know that you can always change your mind. · Ask your doctor about commonly used life-support measures. These include tube feedings, breathing machines, and fluids given through a vein (IV). Understanding these treatments will help you decide whether you want them. · You may choose to have these life-supporting treatments for a limited time. This allows a trial period to see whether they will help you. You may also decide that you want your doctor to take only certain measures to keep you alive. It is important to spell out these conditions so that your doctor and family understand them. · Talk to your doctor about how long you are likely to live. He or she may be able to give you an idea of what usually happens with your specific illness. · Think about preparing papers that state your wishes. This way there will not be any confusion about what you want. You can change your instructions at any time. Which papers should you prepare? Advance directives are legal papers that tell doctors how you want to be cared for at the end of your life. You do not need a  to write these papers. Ask your doctor or your state health department for information on how to write your advance directives. They may have the forms for each of these types of papers. Make sure your doctor has a copy of these on file, and give a copy to a family member or close friend. · Consider a do-not-resuscitate order (DNR).  This order asks that no extra treatments be done if your heart stops or you stop breathing. Extra treatments may include cardiopulmonary resuscitation (CPR), electrical shock to restart your heart, or a machine to breathe for you. If you decide to have a DNR order, ask your doctor to explain and write it. Place the order in your home where everyone can easily see it. · Consider a living will. A living will explains your wishes about life support and other treatments at the end of your life if you become unable to speak for yourself. Living castro tell doctors to use or not use treatments that would keep you alive. You must have one or two witnesses or a notary present when you sign this form. · Consider a durable power of  for health care. This allows you to name a person to make decisions about your care if you are not able to. Most people ask a close friend or family member. Talk to this person about the kinds of treatments you want and those that you do not want. Make sure this person understands your wishes. These legal papers are simple to change. Tell your doctor what you want to change, and ask him or her to make a note in your medical file. Give your family updated copies of the papers. Where can you learn more? Go to http://gwendolyn-gay.info/. Enter P184 in the search box to learn more about \"Advance Care Planning: Care Instructions. \" Current as of: November 17, 2016 Content Version: 11.3 © 5746-4431 BeTheBeast. Care instructions adapted under license by [employer] (which disclaims liability or warranty for this information). If you have questions about a medical condition or this instruction, always ask your healthcare professional. [place] [address] any warranty or liability for your use of this information. Shelley Ramirez 1392 What is a living will?  
A living will is a legal form you use to write down the kind of care you want at the end of your life. It is used by the health professionals who will treat you if you aren't able to decide for yourself. If you put your wishes in writing, your loved ones and others will know what kind of care you want. They won't need to guess. This can ease your mind and be helpful to others. A living will is not the same as an estate or property will. An estate will explains what you want to happen with your money and property after you die. Is a living will a legal document? A living will is a legal document. Each state has its own laws about living castro. If you move to another state, make sure that your living will is legal in the state where you now live. Or you might use a universal form that has been approved by many states. This kind of form can sometimes be completed and stored online. Your electronic copy will then be available wherever you have a connection to the Internet. In most cases, doctors will respect your wishes even if you have a form from a different state. · You don't need an  to complete a living will. But legal advice can be helpful if your state's laws are unclear, your health history is complicated, or your family can't agree on what should be in your living will. · You can change your living will at any time. Some people find that their wishes about end-of-life care change as their health changes. · In addition to making a living will, think about completing a medical power of  form. This form lets you name the person you want to make end-of-life treatment decisions for you (your \"health care agent\") if you're not able to. Many hospitals and nursing homes will give you the forms you need to complete a living will and a medical power of . · Your living will is used only if you can't make or communicate decisions for yourself anymore.  If you become able to make decisions again, you can accept or refuse any treatment, no matter what you wrote in your living will. · Your state may offer an online registry. This is a place where you can store your living will online so the doctors and nurses who need to treat you can find it right away. What should you think about when creating a living will? Talk about your end-of-life wishes with your family members and your doctor. Let them know what you want. That way the people making decisions for you won't be surprised by your choices. Think about these questions as you make your living will: · Do you know enough about life support methods that might be used? If not, talk to your doctor so you know what might be done if you can't breathe on your own, your heart stops, or you're unable to swallow. · What things would you still want to be able to do after you receive life-support methods? Would you want to be able to walk? To speak? To eat on your own? To live without the help of machines? · If you have a choice, where do you want to be cared for? In your home? At a hospital or nursing home? · Do you want certain Yarsani practices performed if you become very ill? · If you have a choice at the end of your life, where would you prefer to die? At home? In a hospital or nursing home? Somewhere else? · Would you prefer to be buried or cremated? · Do you want your organs to be donated after you die? What should you do with your living will? · Make sure that your family members and your health care agent have copies of your living will. · Give your doctor a copy of your living will to keep in your medical record. If you have more than one doctor, make sure that each one has a copy. · You may want to put a copy of your living will where it can be easily found. Where can you learn more? Go to http://gwendolyn-gay.info/. Enter S514 in the search box to learn more about \"Learning About Living Anselmo. \" Current as of: August 8, 2016 Content Version: 11.3 © 9341-6250 Currensee. Care instructions adapted under license by [employer] (which disclaims liability or warranty for this information). If you have questions about a medical condition or this instruction, always ask your healthcare professional. [address] any warranty or liability for your use of this information. Well Visit, Over 72: Care Instructions Your Care Instructions Physical exams can help you stay healthy. Your doctor has checked your overall health and may have suggested ways to take good care of yourself. He or she also may have recommended tests. At home, you can help prevent illness with healthy eating, regular exercise, and other steps. Follow-up care is a key part of your treatment and safety. Be sure to make and go to all appointments, and call your doctor if you are having problems. It's also a good idea to know your test results and keep a list of the medicines you take. How can you care for yourself at home? · Reach and stay at a healthy weight. This will lower your risk for many problems, such as obesity, diabetes, heart disease, and high blood pressure. · Get at least 30 minutes of exercise on most days of the week. Walking is a good choice. You also may want to do other activities, such as running, swimming, cycling, or playing tennis or team sports. · Do not smoke. Smoking can make health problems worse. If you need help quitting, talk to your doctor about stop-smoking programs and medicines. These can increase your chances of quitting for good. · Protect your skin from too much sun. When you're outdoors from 10 a.m. to 4 p.m., stay in the shade or cover up with clothing and a hat with a wide brim. Wear sunglasses that block UV rays. Even when it's cloudy, put broad-spectrum sunscreen (SPF 30 or higher) on any exposed skin.  
· See a dentist one or two times a year for checkups and to have your teeth cleaned. · Wear a seat belt in the car. · Limit alcohol to 2 drinks a day for men and 1 drink a day for women. Too much alcohol can cause health problems. Follow your doctor's advice about when to have certain tests. These tests can spot problems early. For men and women · Cholesterol. Your doctor will tell you how often to have this done based on your overall health and other things that can increase your risk for heart attack and stroke. · Blood pressure. Have your blood pressure checked during a routine doctor visit. Your doctor will tell you how often to check your blood pressure based on your age, your blood pressure results, and other factors. · Diabetes. Ask your doctor whether you should have tests for diabetes. · Vision. Experts recommend that you have yearly exams for glaucoma and other age-related eye problems. · Hearing. Tell your doctor if you notice any change in your hearing. You can have tests to find out how well you hear. · Colon cancer tests. Keep having colon cancer tests as your doctor recommends. You can have one of several types of tests. · Heart attack and stroke risk. At least every 4 to 6 years, you should have your risk for heart attack and stroke assessed. Your doctor uses factors such as your age, blood pressure, cholesterol, and whether you smoke or have diabetes to show what your risk for a heart attack or stroke is over the next 10 years. · Osteoporosis. Talk to your doctor about whether you should have a bone density test to find out whether you have thinning bones. Also ask your doctor about whether you should take calcium and vitamin D supplements. For women · Pap test and pelvic exam. You may no longer need a Pap test. Talk with your doctor about whether to stop or continue to have Pap tests. · Breast exam and mammogram. Ask how often you should have a mammogram, which is an X-ray of your breasts.  A mammogram can spot breast cancer before it can be felt and when it is easiest to treat. · Thyroid disease. Talk to your doctor about whether to have your thyroid checked as part of a regular physical exam. Women have an increased chance of a thyroid problem. For men · Prostate exam. Talk to your doctor about whether you should have a blood test (called a PSA test) for prostate cancer. Experts disagree on whether men should have this test. Some experts recommend that you discuss the benefits and risks of the test with your doctor. · Abdominal aortic aneurysm. Ask your doctor whether you should have a test to check for an aneurysm. You may need a test if you ever smoked or if your parent, brother, sister, or child has had an aneurysm. When should you call for help? Watch closely for changes in your health, and be sure to contact your doctor if you have any problems or symptoms that concern you. Where can you learn more? Go to http://gwendolyn-gay.info/. Enter T271 in the search box to learn more about \"Well Visit, Over 65: Care Instructions. \" Current as of: May 12, 2017 Content Version: 11.4 © 2047-2939 My Top 10. Care instructions adapted under license by [employer] (which disclaims liability or warranty for this information). If you have questions about a medical condition or this instruction, always ask your healthcare professional. [address] any warranty or liability for your use of this information. Introducing [facility] [place] [facility]! [facility] introduces [employer] patient portal. Now you can access parts of your medical record, email your doctor's office, and request medication refills online. 1. In your internet browser, go to [email or web address] 2. Click on the First Time User? Click Here link in the Sign In box. You will see the New Member Sign Up page. 3. Enter your [employer] Access Code exactly as it appears below.  You will not need to use this code after youve completed the sign-up process. If you do not sign up before the expiration date, you must request a new code. · [employer] Access Code: [email or web address] Expires: 3/7/2018  9:31 AM 
 
4. Enter the last four digits of your Social Security Number (xxxx) and Date of Birth (mm/dd/yyyy) as indicated and click Submit. You will be taken to the next sign-up page. 5. Create a [employer] ID. This will be your [employer] login ID and cannot be changed, so think of one that is secure and easy to remember. 6. Create a [employer] password. You can change your password at any time. 7. Enter your Password Reset Question and Answer. This can be used at a later time if you forget your password. 8. Enter your e-mail address. You will receive e-mail notification when new information is available in [address] 9. Click Sign Up. You can now view and download portions of your medical record. 10. Click the Download Summary menu link to download a portable copy of your medical information. If you have questions, please visit the Frequently Asked Questions section of the [employer] website. Remember, [employer] is NOT to be used for urgent needs. For medical emergencies, dial 911. Now available from your iPhone and Android! Please provide this summary of care documentation to your next provider. Your primary care clinician is listed as PAUL Medina. If you have any questions after today's visit, please call [phone].

## 2018-02-22 NOTE — ACP (ADVANCE CARE PLANNING)
Advance Care Planning (ACP) Provider Conversation Snapshot    Date of ACP Conversation: 02/22/18  Persons included in Conversation:  patient  Length of ACP Conversation in minutes:  <16 minutes (Non-Billable)    Authorized Decision Maker (if patient is incapable of making informed decisions): This person is: Other Legally Authorized Decision Maker (e.g. Next of Kin)          For Patients with Decision Making Capacity:   Values/Goals: Exploration of values, goals, and preferences if recovery is not expected, even with continued medical treatment in the event of:  Imminent death  Severe, permanent brain injury  . \"In these circumstances, what matters most to you? \"  Care focused more on comfort or quality of life. Guillaume Navarro Outcomes / Follow-Up Plan:   Recommended completion of Advance Directive form after review of ACP materials and conversation with prospective healthcare agent       2/22/18- discussed in detail- medical decisions to be made by his son- Santy Zeng. He also does not want any life prolonging heroic measures such as intubation, cardioversion, or gastric feeding tubes for any length of time. He will again check to see if he has made a living will at home.

## 2018-02-22 NOTE — PATIENT INSTRUCTIONS
Medicare Wellness Visit, Male    The best way to live healthy is to have a healthy lifestyle by eating a well-balanced diet, exercising regularly, limiting alcohol and stopping smoking. Regular physical exams and screening tests are another way to keep healthy. Preventive exams provided by your health care provider can find health problems before they become diseases or illnesses. Preventive services including immunizations, screening tests, monitoring and exams can help you take care of your own health. All people over age 72 should have a pneumovax  and and a prevnar shot to prevent pneumonia. These are once in a lifetime unless you and your provider decide differently. All people over 65 should have a yearly flu shot and a tetanus vaccine every 10 years. Screening for diabetes mellitus with a blood sugar test should be done every year. Glaucoma is a disease of the eye due to increased ocular pressure that can lead to blindness and it should be done every year by an eye professional.    Cardiovascular screening tests that check for elevated lipids (fatty part of blood) which can lead to heart disease and strokes should be done every 5 years. Colorectal screening that evaluates for blood or polyps in your colon should be done yearly as a stool test or every five years as a flexible sigmoidoscope or every 10 years as a colonoscopy up to age 76. Men up to age 76 may need a screening blood test for prostate cancer at certain intervals, depending on their personal and family history. This decision is between the patient and his provider. If you have been a smoker or had family history of abdominal aortic aneurysms, you and your provider may decide to schedule an ultrasound test of your aorta. Hepatitis C screening is also recommended for anyone born between [birth year] through [address]. A shingles vaccine is also recommended once in a lifetime after age 61.     Your Medicare Wellness Exam is recommended annually. Here is a list of your current Health Maintenance items with a due date:  Health Maintenance Due   Topic Date Due    DTaP/Tdap/Td  (1 - Tdap) 11/14/1951    Annual Well Visit  02/11/2018            Advance Care Planning: Care Instructions  Your Care Instructions  It can be hard to live with an illness that cannot be cured. But if your health is getting worse, you may want to make decisions about end-of-life care. Planning for the end of your life does not mean that you are giving up. It is a way to make sure that your wishes are met. Clearly stating your wishes can make it easier for your loved ones. Making plans while you are still able may also ease your mind and make your final days less stressful and more meaningful. Follow-up care is a key part of your treatment and safety. Be sure to make and go to all appointments, and call your doctor if you are having problems. It's also a good idea to know your test results and keep a list of the medicines you take. What can you do to plan for the end of life? · You can bring these issues up with your doctor. You do not need to wait until your doctor starts the conversation. You might start with \"I would not be willing to live with . Emma Carter Mate Emma Liu \" When you complete this sentence it helps your doctor understand your wishes. · Talk openly and honestly with your doctor. This is the best way to understand the decisions you will need to make as your health changes. Know that you can always change your mind. · Ask your doctor about commonly used life-support measures. These include tube feedings, breathing machines, and fluids given through a vein (IV). Understanding these treatments will help you decide whether you want them. · You may choose to have these life-supporting treatments for a limited time. This allows a trial period to see whether they will help you. You may also decide that you want your doctor to take only certain measures to keep you alive. It is important to spell out these conditions so that your doctor and family understand them. · Talk to your doctor about how long you are likely to live. He or she may be able to give you an idea of what usually happens with your specific illness. · Think about preparing papers that state your wishes. This way there will not be any confusion about what you want. You can change your instructions at any time. Which papers should you prepare? Advance directives are legal papers that tell doctors how you want to be cared for at the end of your life. You do not need a  to write these papers. Ask your doctor or your state health department for information on how to write your advance directives. They may have the forms for each of these types of papers. Make sure your doctor has a copy of these on file, and give a copy to a family member or close friend. · Consider a do-not-resuscitate order (DNR). This order asks that no extra treatments be done if your heart stops or you stop breathing. Extra treatments may include cardiopulmonary resuscitation (CPR), electrical shock to restart your heart, or a machine to breathe for you. If you decide to have a DNR order, ask your doctor to explain and write it. Place the order in your home where everyone can easily see it. · Consider a living will. A living will explains your wishes about life support and other treatments at the end of your life if you become unable to speak for yourself. Living castro tell doctors to use or not use treatments that would keep you alive. You must have one or two witnesses or a notary present when you sign this form. · Consider a durable power of  for health care. This allows you to name a person to make decisions about your care if you are not able to. Most people ask a close friend or family member. Talk to this person about the kinds of treatments you want and those that you do not want.  Make sure this person understands your wishes. These legal papers are simple to change. Tell your doctor what you want to change, and ask him or her to make a note in your medical file. Give your family updated copies of the papers. Where can you learn more? Go to http://gwendolyn-gay.info/. Enter P184 in the search box to learn more about \"Advance Care Planning: Care Instructions. \"  Current as of: November 17, 2016  Content Version: 11.3  © 8630-6369 [employer]. Care instructions adapted under license by [employer] (which disclaims liability or warranty for this information). If you have questions about a medical condition or this instruction, always ask your healthcare professional. [address] any warranty or liability for your use of this information. Learning About Living Perroy  What is a living will? A living will is a legal form you use to write down the kind of care you want at the end of your life. It is used by the health professionals who will treat you if you aren't able to decide for yourself. If you put your wishes in writing, your loved ones and others will know what kind of care you want. They won't need to guess. This can ease your mind and be helpful to others. A living will is not the same as an estate or property will. An estate will explains what you want to happen with your money and property after you die. Is a living will a legal document? A living will is a legal document. Each state has its own laws about living castro. If you move to another state, make sure that your living will is legal in the state where you now live. Or you might use a universal form that has been approved by many states. This kind of form can sometimes be completed and stored online. Your electronic copy will then be available wherever you have a connection to the Internet. In most cases, doctors will respect your wishes even if you have a form from a different state.   · You don't need an  to complete a living will. But legal advice can be helpful if your state's laws are unclear, your health history is complicated, or your family can't agree on what should be in your living will. · You can change your living will at any time. Some people find that their wishes about end-of-life care change as their health changes. · In addition to making a living will, think about completing a medical power of  form. This form lets you name the person you want to make end-of-life treatment decisions for you (your \"health care agent\") if you're not able to. Many hospitals and nursing homes will give you the forms you need to complete a living will and a medical power of . · Your living will is used only if you can't make or communicate decisions for yourself anymore. If you become able to make decisions again, you can accept or refuse any treatment, no matter what you wrote in your living will. · Your state may offer an online registry. This is a place where you can store your living will online so the doctors and nurses who need to treat you can find it right away. What should you think about when creating a living will? Talk about your end-of-life wishes with your family members and your doctor. Let them know what you want. That way the people making decisions for you won't be surprised by your choices. Think about these questions as you make your living will:  · Do you know enough about life support methods that might be used? If not, talk to your doctor so you know what might be done if you can't breathe on your own, your heart stops, or you're unable to swallow. · What things would you still want to be able to do after you receive life-support methods? Would you want to be able to walk? To speak? To eat on your own? To live without the help of machines? · If you have a choice, where do you want to be cared for? In your home? At a hospital or nursing home?   · Do you want certain Mormon practices performed if you become very ill? · If you have a choice at the end of your life, where would you prefer to die? At home? In a hospital or nursing home? Somewhere else? · Would you prefer to be buried or cremated? · Do you want your organs to be donated after you die? What should you do with your living will? · Make sure that your family members and your health care agent have copies of your living will. · Give your doctor a copy of your living will to keep in your medical record. If you have more than one doctor, make sure that each one has a copy. · You may want to put a copy of your living will where it can be easily found. Where can you learn more? Go to http://gwendolyn-gay.info/. Enter B425 in the search box to learn more about \"Learning About Living Perroy. \"  Current as of: August 8, 2016  Content Version: 11.3  © 7915-6292 [employer]. Care instructions adapted under license by [employer] (which disclaims liability or warranty for this information). If you have questions about a medical condition or this instruction, always ask your healthcare professional. [place] [address] any warranty or liability for your use of this information. Well Visit, Over 72: Care Instructions  Your Care Instructions    Physical exams can help you stay healthy. Your doctor has checked your overall health and may have suggested ways to take good care of yourself. He or she also may have recommended tests. At home, you can help prevent illness with healthy eating, regular exercise, and other steps. Follow-up care is a key part of your treatment and safety. Be sure to make and go to all appointments, and call your doctor if you are having problems. It's also a good idea to know your test results and keep a list of the medicines you take. How can you care for yourself at home? · Reach and stay at a healthy weight.  This will lower your risk for many problems, such as obesity, diabetes, heart disease, and high blood pressure. · Get at least 30 minutes of exercise on most days of the week. Walking is a good choice. You also may want to do other activities, such as running, swimming, cycling, or playing tennis or team sports. · Do not smoke. Smoking can make health problems worse. If you need help quitting, talk to your doctor about stop-smoking programs and medicines. These can increase your chances of quitting for good. · Protect your skin from too much sun. When you're outdoors from 10 a.m. to 4 p.m., stay in the shade or cover up with clothing and a hat with a wide brim. Wear sunglasses that block UV rays. Even when it's cloudy, put broad-spectrum sunscreen (SPF 30 or higher) on any exposed skin. · See a dentist one or two times a year for checkups and to have your teeth cleaned. · Wear a seat belt in the car. · Limit alcohol to 2 drinks a day for men and 1 drink a day for women. Too much alcohol can cause health problems. Follow your doctor's advice about when to have certain tests. These tests can spot problems early. For men and women  · Cholesterol. Your doctor will tell you how often to have this done based on your overall health and other things that can increase your risk for heart attack and stroke. · Blood pressure. Have your blood pressure checked during a routine doctor visit. Your doctor will tell you how often to check your blood pressure based on your age, your blood pressure results, and other factors. · Diabetes. Ask your doctor whether you should have tests for diabetes. · Vision. Experts recommend that you have yearly exams for glaucoma and other age-related eye problems. · Hearing. Tell your doctor if you notice any change in your hearing. You can have tests to find out how well you hear. · Colon cancer tests. Keep having colon cancer tests as your doctor recommends.  You can have one of several types of tests.  · Heart attack and stroke risk. At least every 4 to 6 years, you should have your risk for heart attack and stroke assessed. Your doctor uses factors such as your age, blood pressure, cholesterol, and whether you smoke or have diabetes to show what your risk for a heart attack or stroke is over the next 10 years. · Osteoporosis. Talk to your doctor about whether you should have a bone density test to find out whether you have thinning bones. Also ask your doctor about whether you should take calcium and vitamin D supplements. For women  · Pap test and pelvic exam. You may no longer need a Pap test. Talk with your doctor about whether to stop or continue to have Pap tests. · Breast exam and mammogram. Ask how often you should have a mammogram, which is an X-ray of your breasts. A mammogram can spot breast cancer before it can be felt and when it is easiest to treat. · Thyroid disease. Talk to your doctor about whether to have your thyroid checked as part of a regular physical exam. Women have an increased chance of a thyroid problem. For men  · Prostate exam. Talk to your doctor about whether you should have a blood test (called a PSA test) for prostate cancer. Experts disagree on whether men should have this test. Some experts recommend that you discuss the benefits and risks of the test with your doctor. · Abdominal aortic aneurysm. Ask your doctor whether you should have a test to check for an aneurysm. You may need a test if you ever smoked or if your parent, brother, sister, or child has had an aneurysm. When should you call for help? Watch closely for changes in your health, and be sure to contact your doctor if you have any problems or symptoms that concern you. Where can you learn more? Go to http://gwendolyn-gay.info/. Enter U717 in the search box to learn more about \"Well Visit, Over 65: Care Instructions. \"  Current as of:  May 12, 2017  Content Version: 11.4  © 3305-6084 [employer]. Care instructions adapted under license by [email or web address] (which disclaims liability or warranty for this information). If you have questions about a medical condition or this instruction, always ask your healthcare professional. [place] [address] any warranty or liability for your use of this information.

## 2018-03-19 ENCOUNTER — OFFICE VISIT (OUTPATIENT)
Dept: FAMILY MEDICINE CLINIC | Age: 83
End: 2018-03-19

## 2018-03-19 VITALS
BODY MASS INDEX: 25.23 KG/M2 | HEIGHT: 66 IN | WEIGHT: 157 LBS | TEMPERATURE: 96.8 F | RESPIRATION RATE: 17 BRPM | DIASTOLIC BLOOD PRESSURE: 45 MMHG | HEART RATE: 82 BPM | SYSTOLIC BLOOD PRESSURE: 151 MMHG

## 2018-03-19 DIAGNOSIS — J31.0 RHINITIS, UNSPECIFIED CHRONICITY, UNSPECIFIED TYPE: Primary | ICD-10-CM

## 2018-03-19 RX ORDER — DIPHENHYDRAMINE HCL 25 MG
25 TABLET ORAL
Qty: 30 TAB | Refills: 3 | Status: SHIPPED | OUTPATIENT
Start: 2018-03-19

## 2018-03-19 RX ORDER — FLUTICASONE PROPIONATE 50 MCG
2 SPRAY, SUSPENSION (ML) NASAL DAILY
Qty: 1 BOTTLE | Refills: 6 | Status: SHIPPED | OUTPATIENT
Start: 2018-03-19

## 2018-03-19 NOTE — PROGRESS NOTES
1. Have you been to the ER, urgent care clinic since your last visit? Hospitalized since your last visit? No    2. Have you seen or consulted any other health care providers outside of the [address] [place] [address] since your last visit? Include any pap smears or colon screening. No       Patient reports he has been having nausea and vomiting, congestion.

## 2018-03-19 NOTE — PATIENT INSTRUCTIONS
Rhinitis: Care Instructions  Your Care Instructions  Rhinitis is swelling and irritation in the nose. Allergies and infections are often the cause. Your nose may run or feel stuffy. Other symptoms are itchy and sore eyes, ears, throat, and mouth. If allergies are the cause, your doctor may do tests to find out what you are allergic to. You may be able to stop symptoms if you avoid the things that cause them. Your doctor may suggest or prescribe medicine to ease your symptoms. Follow-up care is a key part of your treatment and safety. Be sure to make and go to all appointments, and call your doctor if you are having problems. It's also a good idea to know your test results and keep a list of the medicines you take. How can you care for yourself at home? · If your rhinitis is caused by allergies, try to find out what sets off (triggers) your symptoms. Take steps to avoid these triggers. ¨ Avoid yard work. It can stir up both pollen and mold. ¨ Do not smoke or allow others to smoke around you. If you need help quitting, talk to your doctor about stop-smoking programs and medicines. These can increase your chances of quitting for good. ¨ Do not use aerosol sprays, cleaning products, or perfumes. ¨ If pollen is one of your triggers, close your house and car windows during blooming season. ¨ Clean your house often to control dust.  ¨ Keep pets outside. · If your doctor recommends over-the-counter medicines to relieve symptoms, take your medicines exactly as prescribed. Call your doctor if you think you are having a problem with your medicine. · Use saline (saltwater) nasal washes to help keep your nasal passages open and wash out mucus and bacteria. You can buy saline nose drops at a grocery store or drugstore. Or you can make your own at home by adding 1 teaspoon of salt and 1 teaspoon of baking soda to 2 cups of distilled water.  If you make your own, fill a bulb syringe with the solution, insert the tip into your nostril, and squeeze gently. Alis Prom your nose. When should you call for help? Call your doctor now or seek immediate medical care if:  ? · You are having trouble breathing. ? Watch closely for changes in your health, and be sure to contact your doctor if:  ? · Mucus from your nose gets thicker (like pus) or has new blood in it. ? · You have new or worse symptoms. ? · You do not get better as expected. Where can you learn more? Go to http://gwendolyn-gay.info/. Enter M030 in the search box to learn more about \"Rhinitis: Care Instructions. \"  Current as of: May 12, 2017  Content Version: 11.4  © 7917-0463 [employer]. Care instructions adapted under license by [employer] (which disclaims liability or warranty for this information). If you have questions about a medical condition or this instruction, always ask your healthcare professional. [place] [address] any warranty or liability for your use of this information. Using a Nasal Steroid Spray: Care Instructions  Your Care Instructions    Your doctor may suggest using a corticosteroid nasal spray for your allergy symptoms or sinus problems. These sprays reduce the swelling inside the nose and sinuses. Unlike decongestant nasal sprays, steroid sprays won't lead to more swelling when you stop taking them. These sprays start working in a few days, but it may take several weeks before you get the full effect. Most side effects are minor. The most common complaint is a burning feeling in the nose right after the spray is used. Some people get nosebleeds. Follow-up care is a key part of your treatment and safety. Be sure to make and go to all appointments, and call your doctor if you are having problems. It's also a good idea to know your test results and keep a list of the medicines you take. How can you care for yourself at home?   Here are some tips for using these sprays:  · You may need to prime the sprayer before you use it. This means spraying it into the air a few times to make sure you get the right amount of medicine. Follow the directions on the label. · Blow your nose before you spray. This will help clear out your nostrils. · Gently sniff the medicine into your nose as you spray. Don't snort, or the medicine will go all the way into your throat where it won't do much good. · Aim the nozzle straight toward the outer wall of your nostril. This will help keep the medicine from irritating the inner walls of your nose, especially your septum (the wall that separates your left and right nostrils). · Don't blow your nose for 10 minutes or so after you spray. And try not to sneeze. · Be safe with medicines. Use this medicine exactly as prescribed. Call your doctor if you think you are having a problem with your medicine. · Clean your sprayer once a week. Read the label to learn how. When should you call for help? Watch closely for changes in your health, and be sure to contact your doctor if you have any problems. Where can you learn more? Go to http://gwendolyn-gay.info/. Enter U363 in the search box to learn more about \"Using a Nasal Steroid Spray: Care Instructions. \"  Current as of: May 12, 2017  Content Version: 11.4  © 4687-4968 Healthwise, Incorporated. Care instructions adapted under license by [employer] (which disclaims liability or warranty for this information). If you have questions about a medical condition or this instruction, always ask your healthcare professional. [place] [address] any warranty or liability for your use of this information.

## 2018-03-19 NOTE — MR AVS SNAPSHOT
Shelley De Anda [place] [address] [facility] [place] [address] [address] 
[phone] Patient: Lyn Jett MRN: [id number] UEX:10/54/2696 Visit Information Date & Time Provider Department Dept. Phone Encounter #  
 3/19/2018  9:30 AM Ruslan Lipscomb, Joanne [facility] [phone] [id number] Your Appointments 6/7/2018  9:30 AM  
Follow Up with Rhina Napoles MD  
[facility] 23 [facility] Appt Note: 4 mo f/u  
 [facility] [place] [address]  
  
   
 [address] [facility] Upcoming Health Maintenance Date Due DTaP/Tdap/Td series (1 - Tdap) 11/14/1951 MEDICARE YEARLY EXAM 2/23/2019 GLAUCOMA SCREENING Q2Y 7/13/2019 Allergies as of 3/19/2018  Review Complete On: 2/22/2018 By: Rhina Napoles MD  
  
 Severity Noted Reaction Type Reactions Ace Inhibitors  11/29/2010    Cough Codeine  11/19/2010    Other (comments)  
 headache Effexor [Venlafaxine]  06/14/2017    Other (comments)  
 confusion Fosamax [Alendronate]  11/19/2010    Other (comments) peridontal pain Procardia [Nifedipine]  11/19/2010    Swelling Current Immunizations  Reviewed on 3/19/2018 Name Date Influenza High Dose Vaccine PF 9/7/2017, 11/15/2016 Influenza Vaccine 11/5/2014, 10/8/2013 Influenza Vaccine (Quad) 9/8/2015 12:00 PM  
 Pneumococcal Conjugate (PCV-13) 11/15/2016 ZZZ-RETIRED (DO NOT USE) Pneumococcal Vaccine (Unspecified Type) 11/7/2011 Reviewed by Dione Awad LPN on 5/50/6269 at  9:36 AM  
You Were Diagnosed With   
  
 Codes Comments Rhinitis, unspecified chronicity, unspecified type    -  Primary ICD-10-CM: J31.0 ICD-9-CM: 472.0 Vitals BP Pulse Temp Resp Height(growth percentile) Weight(growth percentile) 151/45 82 96.8 °F (36 °C) (Oral) 17 5' 6\" (1.676 m) 157 lb (71.2 kg) BMI Smoking Status 25.34 kg/m2 Former Smoker Vitals History BMI and BSA Data Body Mass Index Body Surface Area  
 25.34 kg/m 2 1.82 m 2 Preferred Pharmacy Pharmacy Name Phone [facility] [address] [place] [address] [phone] Your Updated Medication List  
  
   
This list is accurate as of 3/19/18  9:57 AM.  Always use your most recent med list.  
  
  
  
  
 albuterol 90 mcg/actuation inhaler Commonly known as:  PROVENTIL HFA, VENTOLIN HFA, PROAIR HFA Take 1 Puff by inhalation every four (4) hours as needed for Wheezing. amLODIPine 5 mg tablet Commonly known as:  Gordon Ziyad Take 5 mg by mouth daily. aspirin delayed-release 81 mg tablet Take 1 Tab by mouth daily. atorvastatin 40 mg tablet Commonly known as:  LIPITOR  
take 1 tablet by mouth once daily  
  
 calcium-cholecalciferol (D3) tablet Commonly known as:  CALCARB 600 WITH VITAMIN D Take 1 Tab by mouth two (2) times a day. clotrimazole 1 % topical cream  
Commonly known as:  Ulanda Ganjolene Apply  to affected area two (2) times a day. Use for 2 weeks. cyanocobalamin 1,000 mcg tablet Commonly known as:  VITAMIN B-12 Take 1 Tab by mouth daily. diphenhydrAMINE 25 mg tablet Commonly known as:  BENADRYL Take 1 Tab by mouth nightly. fluticasone 50 mcg/actuation nasal spray Commonly known as:  Marsa Albe 2 Sprays by Both Nostrils route daily. metoprolol tartrate 25 mg tablet Commonly known as:  LOPRESSOR Take 12.5 mg by mouth daily. Prescriptions Sent to Pharmacy Refills  
 fluticasone (FLONASE) 50 mcg/actuation nasal spray 6 Si Sprays by Both Nostrils route daily. Class: Normal  
 Pharmacy: [facility] [address] [place] [address] Ph #: [phone] Route: Both Nostrils diphenhydrAMINE (BENADRYL) 25 mg tablet 3 Sig: Take 1 Tab by mouth nightly.   
 Class: Normal  
 Pharmacy: [facility] [address] [place] [address] 100 [facility], 130 [facility] [address] [place] [address] #: [phone] Route: Oral  
  
Patient Instructions Rhinitis: Care Instructions Your Care Instructions Rhinitis is swelling and irritation in the nose. Allergies and infections are often the cause. Your nose may run or feel stuffy. Other symptoms are itchy and sore eyes, ears, throat, and mouth. If allergies are the cause, your doctor may do tests to find out what you are allergic to. You may be able to stop symptoms if you avoid the things that cause them. Your doctor may suggest or prescribe medicine to ease your symptoms. Follow-up care is a key part of your treatment and safety. Be sure to make and go to all appointments, and call your doctor if you are having problems. It's also a good idea to know your test results and keep a list of the medicines you take. How can you care for yourself at home? · If your rhinitis is caused by allergies, try to find out what sets off (triggers) your symptoms. Take steps to avoid these triggers. ¨ Avoid yard work. It can stir up both pollen and mold. ¨ Do not smoke or allow others to smoke around you. If you need help quitting, talk to your doctor about stop-smoking programs and medicines. These can increase your chances of quitting for good. ¨ Do not use aerosol sprays, cleaning products, or perfumes. ¨ If pollen is one of your triggers, close your house and car windows during blooming season. ¨ Clean your house often to control dust. 
¨ Keep pets outside. · If your doctor recommends over-the-counter medicines to relieve symptoms, take your medicines exactly as prescribed. Call your doctor if you think you are having a problem with your medicine. · Use saline (saltwater) nasal washes to help keep your nasal passages open and wash out mucus and bacteria. You can buy saline nose drops at a grocery store or drugstore.  Or you can make your own at home by adding 1 teaspoon of salt and 1 teaspoon of baking soda to 2 cups of distilled water. If you make your own, fill a bulb syringe with the solution, insert the tip into your nostril, and squeeze gently. Mikel Carbine your nose. When should you call for help? Call your doctor now or seek immediate medical care if: 
? · You are having trouble breathing. ? Watch closely for changes in your health, and be sure to contact your doctor if: 
? · Mucus from your nose gets thicker (like pus) or has new blood in it. ? · You have new or worse symptoms. ? · You do not get better as expected. Where can you learn more? Go to http://gwendolyn-gay.info/. Enter M030 in the search box to learn more about \"Rhinitis: Care Instructions. \" Current as of: May 12, 2017 Content Version: 11.4 © 7162-1849 [employer]. Care instructions adapted under license by [employer] (which disclaims liability or warranty for this information). If you have questions about a medical condition or this instruction, always ask your healthcare professional. [place] [address] any warranty or liability for your use of this information. Using a Nasal Steroid Spray: Care Instructions Your Care Instructions Your doctor may suggest using a corticosteroid nasal spray for your allergy symptoms or sinus problems. These sprays reduce the swelling inside the nose and sinuses. Unlike decongestant nasal sprays, steroid sprays won't lead to more swelling when you stop taking them. These sprays start working in a few days, but it may take several weeks before you get the full effect. Most side effects are minor. The most common complaint is a burning feeling in the nose right after the spray is used. Some people get nosebleeds. Follow-up care is a key part of your treatment and safety.  Be sure to make and go to all appointments, and call your doctor if you are having problems. It's also a good idea to know your test results and keep a list of the medicines you take. How can you care for yourself at home? Here are some tips for using these sprays: 
· You may need to prime the sprayer before you use it. This means spraying it into the air a few times to make sure you get the right amount of medicine. Follow the directions on the label. · Blow your nose before you spray. This will help clear out your nostrils. · Gently sniff the medicine into your nose as you spray. Don't snort, or the medicine will go all the way into your throat where it won't do much good. · Aim the nozzle straight toward the outer wall of your nostril. This will help keep the medicine from irritating the inner walls of your nose, especially your septum (the wall that separates your left and right nostrils). · Don't blow your nose for 10 minutes or so after you spray. And try not to sneeze. · Be safe with medicines. Use this medicine exactly as prescribed. Call your doctor if you think you are having a problem with your medicine. · Clean your sprayer once a week. Read the label to learn how. When should you call for help? Watch closely for changes in your health, and be sure to contact your doctor if you have any problems. Where can you learn more? Go to http://gwendolyn-gay.info/. Enter H550 in the search box to learn more about \"Using a Nasal Steroid Spray: Care Instructions. \" Current as of: May 12, 2017 Content Version: 11.4 © 4972-2839 [employer]. Care instructions adapted under license by [employer] (which disclaims liability or warranty for this information). If you have questions about a medical condition or this instruction, always ask your healthcare professional. [address] any warranty or liability for your use of this information. Introducing [facility] SERVICES! Jami Muñoz introduces [employer] patient portal. Now you can access parts of your medical record, email your doctor's office, and request medication refills online. 1. In your internet browser, go to https://[email or web address]. [employer] 2. Click on the First Time User? Click Here link in the Sign In box. You will see the New Member Sign Up page. 3. Enter your [employer] Access Code exactly as it appears below. You will not need to use this code after youve completed the sign-up process. If you do not sign up before the expiration date, you must request a new code. · [employer] Access Code: [email or web address] Expires: 6/17/2018  9:51 AM 
 
4. Enter the last four digits of your Social Security Number (xxxx) and Date of Birth (mm/dd/yyyy) as indicated and click Submit. You will be taken to the next sign-up page. 5. Create a [employer] ID. This will be your [employer] login ID and cannot be changed, so think of one that is secure and easy to remember. 6. Create a [employer] password. You can change your password at any time. 7. Enter your Password Reset Question and Answer. This can be used at a later time if you forget your password. 8. Enter your e-mail address. You will receive e-mail notification when new information is available in [address] 9. Click Sign Up. You can now view and download portions of your medical record. 10. Click the Download Summary menu link to download a portable copy of your medical information. If you have questions, please visit the Frequently Asked Questions section of the [employer] website. Remember, [employer] is NOT to be used for urgent needs. For medical emergencies, dial 911. Now available from your iPhone and Android! Please provide this summary of care documentation to your next provider. Your primary care clinician is listed as PAUL Medina. If you have any questions after today's visit, please call [phone].

## 2018-03-19 NOTE — PROGRESS NOTES
[facility]    CC: Vomiting    HPI:     - Timing/onset: Started around last week. In morning after cup of coffee  - Duration: Has had 2-3 episodes. - Quality: Non-bloody. Non-bilious. - Associated Symptoms/signs: runny nose, post nasal drip, cough when laying down at night (Coughing up nasal drainage), and decreased appetite. - Triggers:  Thinks it is 2/2 post nasal drip that is upsetting his stomach      ROS: Positive items marked in RED  CON: fever, chills  ENT: rhinorrhea, sneezing, post nasal drip  Cardiovascular: palpitations, CP  Resp: cough, SOB  Lymph/Heme: swollen/enlarged lymph nodes, tender/painful lymph nodes  GI: nausea, vomiting, diarrhea, melena, hemoptysis, hematochezia  : dysuria, hematuria      Past Medical History:   Diagnosis Date    AAA (abdominal aortic aneurysm) ([address]     infrarenal    B12 deficiency 7/2009    261    CAD (coronary artery disease)     Denis    Diverticulosis of colon 2009    sigmoid    Fatty liver 2009    Hearing loss     aides    HTN (hypertension)     Hyperlipidemia     Hypogonadism male     Inguinal hernia     right    Osteoporosis 2006    comp fx T6,7, L1, 2009 fx T8,9, right 10th rib    Renal cyst, right 2009       Past Surgical History:   Procedure Laterality Date    HX CORONARY ARTERY BYPASS GRAFT      HX HERNIA REPAIR      left       Family History   Problem Relation Age of Onset    Cancer Mother     Stroke Mother     Heart Disease Father        Social History     Social History    Marital status:      Spouse name: N/A    Number of children: N/A    Years of education: N/A     Social History Main Topics    Smoking status: Former Smoker     Years: 8.00     Quit date: 11/7/1953    Smokeless tobacco: Never Used    Alcohol use No    Drug use: No    Sexual activity: Not Currently     Partners: Female     Other Topics Concern    None     Social History Narrative       Allergies   Allergen Reactions    Ace Inhibitors Cough    Codeine Other (comments)     headache    Effexor [Venlafaxine] Other (comments)     confusion    Fosamax [Alendronate] Other (comments)     peridontal pain    Procardia [Nifedipine] Swelling         Current Outpatient Prescriptions:     atorvastatin (LIPITOR) 40 mg tablet, take 1 tablet by mouth once daily, Disp: 30 Tab, Rfl: 11    amLODIPine (NORVASC) 5 mg tablet, Take 5 mg by mouth daily. , Disp: , Rfl:     metoprolol tartrate (LOPRESSOR) 25 mg tablet, Take 12.5 mg by mouth daily. , Disp: , Rfl:     aspirin delayed-release 81 mg tablet, Take 1 Tab by mouth daily. , Disp: 90 Tab, Rfl: 3    cyanocobalamin (VITAMIN B-12) 1,000 mcg tablet, Take 1 Tab by mouth daily. , Disp: 90 Tab, Rfl: 3    calcium-cholecalciferol, D3, (CALCARB 600 WITH VITAMIN D) tablet, Take 1 Tab by mouth two (2) times a day., Disp: 180 Tab, Rfl: 3    clotrimazole (LOTRIMIN) 1 % topical cream, Apply  to affected area two (2) times a day. Use for 2 weeks. , Disp: 30 g, Rfl: 1    albuterol (PROVENTIL HFA, VENTOLIN HFA, PROAIR HFA) 90 mcg/actuation inhaler, Take 1 Puff by inhalation every four (4) hours as needed for Wheezing., Disp: 1 Inhaler, Rfl: 1    Physical Exam:      /45  Pulse 82  Temp 96.8 °F (36 °C) (Oral)   Resp 17  Ht 5' 6\" (1.676 m)  Wt 157 lb (71.2 kg)  BMI 25.34 kg/m2    General:  Kyphotic, NAD, non-toxic, well appearing  Eyes: sclera clear bilaterally, no discharge noted, eyelids normal in appearance  HENT: NCAT, nasal turbinates enlarged bilaterally, oropharynx with cobblestonning, MMM  Neck: supple, no lymphadenopathy  Lungs: CTAB, Normal respiratory effort and rate  CV: RRR, no rubs or gallops  ABD: soft, non-tender, non-distended, normal bowel sounds  Ext: no peripheral edema or digital cyanosis noted  Skin: normal temperature, turgor, color, and texture  Psych: alert and oriented to person, place and time, normal affect  Neuro: speech normal, moving all extremities      Assessment/Plan     Rhinitis:  - Allergic vs Viral etiology  - Change of season and cobblestoning suggest possible allergic etiology. Viral syndrome may explain vomiting and cough, although his post nasal drip alone maybe triggering his cough and nausea.   - Will start on Flonase and benadryl regimen  - Handout on rhinitis care and nasal steroid was given  - Follow up as needed if symptoms worsen or fail to improve        Rupa Mcmanus MD  3/19/2018, 9:41 AM

## 2018-03-27 ENCOUNTER — OFFICE VISIT (OUTPATIENT)
Dept: FAMILY MEDICINE CLINIC | Age: 83
End: 2018-03-27

## 2018-03-27 VITALS
BODY MASS INDEX: 24.17 KG/M2 | OXYGEN SATURATION: 92 % | WEIGHT: 150.4 LBS | HEIGHT: 66 IN | RESPIRATION RATE: 16 BRPM | TEMPERATURE: 96.8 F | SYSTOLIC BLOOD PRESSURE: 130 MMHG | HEART RATE: 87 BPM | DIASTOLIC BLOOD PRESSURE: 43 MMHG

## 2018-03-27 DIAGNOSIS — J43.9 PULMONARY EMPHYSEMA, UNSPECIFIED EMPHYSEMA TYPE (HCC): ICD-10-CM

## 2018-03-27 DIAGNOSIS — R05.9 COUGH: Primary | ICD-10-CM

## 2018-03-27 DIAGNOSIS — J40 BRONCHITIS: ICD-10-CM

## 2018-03-27 RX ORDER — AZITHROMYCIN 250 MG/1
TABLET, FILM COATED ORAL
Qty: 6 TAB | Refills: 0 | Status: SHIPPED | OUTPATIENT
Start: 2018-03-27 | End: 2018-04-23 | Stop reason: ALTCHOICE

## 2018-03-27 RX ORDER — ALBUTEROL SULFATE 90 UG/1
1 AEROSOL, METERED RESPIRATORY (INHALATION)
Qty: 1 INHALER | Refills: 0 | Status: SHIPPED | OUTPATIENT
Start: 2018-03-27 | End: 2018-05-22 | Stop reason: SDUPTHER

## 2018-03-27 NOTE — PROGRESS NOTES
1. Have you been to the ER, urgent care clinic since your last visit? Hospitalized since your last visit? No.     2. Have you seen or consulted any other health care providers outside of the [address] [place] [address] since your last visit? Include any pap smears or colon screening. No.    Chief Complaint   Patient presents with    Cough     with gray thick phlegm for about 2 weeks.      Fatigue

## 2018-03-27 NOTE — PROGRESS NOTES
Floyd Ritchie is a 80 y.o. male and presents with Cough (with gray thick phlegm for about 2 weeks. ) and Fatigue       Subjective:    Coughing for about 2 weeks- bringing up gray sputum. Rhinorrhea and nasal congestion. Daughter visiting and states pt is weak and sleeping \"alot\"- pt wakes at 5 am usually. Pt feels \"wore out\" and not sob. No cp / no f/c. Some nausea. And one episode of emesis. No diarrhea. Assessment/Plan:    Cough for 2 weeks -possible bronchitis vs post viral cough-  given age/malaise/emphysema will do cxr and start abx. Pt will return if worsening or not improving. Osteoporosis- pt has not wanted tx in past- discussed with pt/daughter- unclear reaction to fosamax- consider dental eval and possibly prolia in future. RTC prn. Orders Placed This Encounter    XR CHEST PA LAT     Standing Status:   Future     Number of Occurrences:   1     Standing Expiration Date:   4/27/2019     Order Specific Question:   Reason for Exam     Answer:   cough for 2 weeks/emphysema    albuterol (PROVENTIL HFA, VENTOLIN HFA, PROAIR HFA) 90 mcg/actuation inhaler     Sig: Take 1 Puff by inhalation every six (6) hours as needed for Wheezing. Or cough     Dispense:  1 Inhaler     Refill:  0    azithromycin (ZITHROMAX) 250 mg tablet     Sig: Take two tablets today then one tablet daily     Dispense:  6 Tab     Refill:  0     CXR reviewed by myself- no clear infiltrate. Severe kyphosis. ROS:  Negative except as mentioned above  Cardiac- no chest pain or palpitations  Pulmonary- no sob or wheezes  GI- no n/v or diarrhea.       SH:  Social History   Substance Use Topics    Smoking status: Former Smoker     Years: 8.00     Quit date: 11/7/1953    Smokeless tobacco: Never Used    Alcohol use No         Medications/Allergies:  Current Outpatient Prescriptions on File Prior to Visit   Medication Sig Dispense Refill    fluticasone (FLONASE) 50 mcg/actuation nasal spray 2 Sprays by Both Nostrils route daily. 1 Bottle 6    diphenhydrAMINE (BENADRYL) 25 mg tablet Take 1 Tab by mouth nightly. 30 Tab 3    atorvastatin (LIPITOR) 40 mg tablet take 1 tablet by mouth once daily 30 Tab 11    amLODIPine (NORVASC) 5 mg tablet Take 5 mg by mouth daily.  metoprolol tartrate (LOPRESSOR) 25 mg tablet Take 12.5 mg by mouth daily.  aspirin delayed-release 81 mg tablet Take 1 Tab by mouth daily. 90 Tab 3    cyanocobalamin (VITAMIN B-12) 1,000 mcg tablet Take 1 Tab by mouth daily. 90 Tab 3    calcium-cholecalciferol, D3, (CALCARB 600 WITH VITAMIN D) tablet Take 1 Tab by mouth two (2) times a day. 180 Tab 3    clotrimazole (LOTRIMIN) 1 % topical cream Apply  to affected area two (2) times a day. Use for 2 weeks. 30 g 1    albuterol (PROVENTIL HFA, VENTOLIN HFA, PROAIR HFA) 90 mcg/actuation inhaler Take 1 Puff by inhalation every four (4) hours as needed for Wheezing. 1 Inhaler 1     No current facility-administered medications on file prior to visit. Allergies   Allergen Reactions    Ace Inhibitors Cough    Codeine Other (comments)     headache    Effexor [Venlafaxine] Other (comments)     confusion    Fosamax [Alendronate] Other (comments)     peridontal pain    Procardia [Nifedipine] Swelling       Objective:  Visit Vitals    /43    Pulse 87    Temp 96.8 °F (36 °C) (Oral)    Resp 16    Ht 5' 6\" (1.676 m)    Wt 150 lb 6.4 oz (68.2 kg)    SpO2 92%    BMI 24.28 kg/m2    Body mass index is 24.28 kg/(m^2). Constitutional: Well developed, nourished, no distress, alert   HENT: Exterior ears and tympanic membranes normal bilaterally. Supple neck. No thyromegaly or lymphadenopathy. Oropharynx with pharyngeal erythema and moist mucous membranes. Eyes: Conjunctiva normal. PERRL. CV: S1, S2.  RRR. 3/6 diastolic murmurs/rubs. No thrills palpated. No carotid bruits. Intact distal pulses. No edema. Pulm: No abnormalities on inspection. Clear to auscultation bilaterally.  No wheezing/rhonchi. Normal effort. GI: Soft, nontender, nondistended. Normal active bowel sounds. No  masses on palpation. No hepatosplenomegaly.

## 2018-03-27 NOTE — MR AVS SNAPSHOT
Shelley De Anda [place] [address] [facility] [place] [address] [id number] 
[phone] Patient: Berta Morris MRN: [id number] SNF:61/02/1410 Visit Information Date & Time Provider Department Dept. Phone Encounter #  
 3/27/2018  2:45 PM Anita Porras, [address] [place] [address] [phone] [id number] Follow-up Instructions Return if symptoms worsen or fail to improve. Your Appointments 6/7/2018  9:30 AM  
Follow Up with Anita Porras MD  
[facility] 23 (Percy Dieter) Appt Note: 4 mo f/u  
 [facility] [place] [address]  
  
   
 [address] Upcoming Health Maintenance Date Due DTaP/Tdap/Td series (1 - Tdap) 11/14/1951 MEDICARE YEARLY EXAM 2/23/2019 GLAUCOMA SCREENING Q2Y 7/13/2019 Allergies as of 3/27/2018  Review Complete On: 3/27/2018 By: Anita Porras MD  
  
 Severity Noted Reaction Type Reactions Ace Inhibitors  11/29/2010    Cough Codeine  11/19/2010    Other (comments)  
 headache Effexor [Venlafaxine]  06/14/2017    Other (comments)  
 confusion Fosamax [Alendronate]  11/19/2010    Other (comments) peridontal pain Procardia [Nifedipine]  11/19/2010    Swelling Current Immunizations  Reviewed on 3/19/2018 Name Date Influenza High Dose Vaccine PF 9/7/2017, 11/15/2016 Influenza Vaccine 11/5/2014, 10/8/2013 Influenza Vaccine (Quad) 9/8/2015 12:00 PM  
 Pneumococcal Conjugate (PCV-13) 11/15/2016 ZZZ-RETIRED (DO NOT USE) Pneumococcal Vaccine (Unspecified Type) 11/7/2011 Not reviewed this visit You Were Diagnosed With   
  
 Codes Comments Cough    -  Primary ICD-10-CM: Y97 ICD-9-CM: 786.2 Pulmonary emphysema, unspecified emphysema type ([facility] [address]     ICD-10-CM: J43.9 ICD-9-CM: 492.8 Bronchitis     ICD-10-CM: J40 ICD-9-CM: 663 Vitals BP Pulse Temp Resp Height(growth percentile) Weight(growth percentile) 130/43 87 96.8 °F (36 °C) (Oral) 16 5' 6\" (1.676 m) 150 lb 6.4 oz (68.2 kg) SpO2 BMI Smoking Status 92% 24.28 kg/m2 Former Smoker BMI and BSA Data Body Mass Index Body Surface Area  
 24.28 kg/m 2 1.78 m 2 Preferred Pharmacy Pharmacy Name Phone [facility] [phone], [phone] [place] [address] [phone] Your Updated Medication List  
  
   
This list is accurate as of 3/27/18  3:31 PM.  Always use your most recent med list.  
  
  
  
  
 * albuterol 90 mcg/actuation inhaler Commonly known as:  PROVENTIL HFA, VENTOLIN HFA, PROAIR HFA Take 1 Puff by inhalation every four (4) hours as needed for Wheezing. * albuterol 90 mcg/actuation inhaler Commonly known as:  PROVENTIL HFA, VENTOLIN HFA, PROAIR HFA Take 1 Puff by inhalation every six (6) hours as needed for Wheezing. Or cough  
  
 amLODIPine 5 mg tablet Commonly known as:  Larayne Catherine Take 5 mg by mouth daily. aspirin delayed-release 81 mg tablet Take 1 Tab by mouth daily. atorvastatin 40 mg tablet Commonly known as:  LIPITOR  
take 1 tablet by mouth once daily  
  
 azithromycin 250 mg tablet Commonly known as:  Judy Pickup Take two tablets today then one tablet daily  
  
 calcium-cholecalciferol (D3) tablet Commonly known as:  CALCARB 600 WITH VITAMIN D Take 1 Tab by mouth two (2) times a day. clotrimazole 1 % topical cream  
Commonly known as:  Kenlayton Caraballo Apply  to affected area two (2) times a day. Use for 2 weeks. cyanocobalamin 1,000 mcg tablet Commonly known as:  VITAMIN B-12 Take 1 Tab by mouth daily. diphenhydrAMINE 25 mg tablet Commonly known as:  BENADRYL Take 1 Tab by mouth nightly. fluticasone 50 mcg/actuation nasal spray Commonly known as:  Veda Dennis 2 Sprays by Both Nostrils route daily. metoprolol tartrate 25 mg tablet Commonly known as:  LOPRESSOR Take 12.5 mg by mouth daily. * Notice: This list has 2 medication(s) that are the same as other medications prescribed for you. Read the directions carefully, and ask your doctor or other care provider to review them with you. Prescriptions Printed Refills  
 albuterol (PROVENTIL HFA, VENTOLIN HFA, PROAIR HFA) 90 mcg/actuation inhaler 0 Sig: Take 1 Puff by inhalation every six (6) hours as needed for Wheezing. Or cough Class: Print Route: Inhalation  
 azithromycin (ZITHROMAX) 250 mg tablet 0 Sig: Take two tablets today then one tablet daily Class: Print Follow-up Instructions Return if symptoms worsen or fail to improve. To-Do List   
 03/27/2018 Imaging:  XR CHEST PA LAT Patient Instructions Bronchitis: Care Instructions Your Care Instructions Bronchitis is inflammation of the bronchial tubes, which carry air to the lungs. The tubes swell and produce mucus, or phlegm. The mucus and inflamed bronchial tubes make you cough. You may have trouble breathing. Most cases of bronchitis are caused by viruses like those that cause colds. Antibiotics usually do not help and they may be harmful. Bronchitis usually develops rapidly and lasts about 2 to 3 weeks in otherwise healthy people. Follow-up care is a key part of your treatment and safety. Be sure to make and go to all appointments, and call your doctor if you are having problems. It's also a good idea to know your test results and keep a list of the medicines you take. How can you care for yourself at home? · Take all medicines exactly as prescribed. Call your doctor if you think you are having a problem with your medicine. · Get some extra rest. 
· Take an over-the-counter pain medicine, such as acetaminophen (Tylenol), ibuprofen (Advil, Motrin), or naproxen (Aleve) to reduce fever and relieve body aches. Read and follow all instructions on the label. · Do not take two or more pain medicines at the same time unless the doctor told you to. Many pain medicines have acetaminophen, which is Tylenol. Too much acetaminophen (Tylenol) can be harmful. · Take an over-the-counter cough medicine that contains dextromethorphan to help quiet a dry, hacking cough so that you can sleep. Avoid cough medicines that have more than one active ingredient. Read and follow all instructions on the label. · Breathe moist air from a humidifier, hot shower, or sink filled with hot water. The heat and moisture will thin mucus so you can cough it out. · Do not smoke. Smoking can make bronchitis worse. If you need help quitting, talk to your doctor about stop-smoking programs and medicines. These can increase your chances of quitting for good. When should you call for help? Call 911 anytime you think you may need emergency care. For example, call if: 
? · You have severe trouble breathing. ?Call your doctor now or seek immediate medical care if: 
? · You have new or worse trouble breathing. ? · You cough up dark brown or bloody mucus (sputum). ? · You have a new or higher fever. ? · You have a new rash. ? Watch closely for changes in your health, and be sure to contact your doctor if: 
? · You cough more deeply or more often, especially if you notice more mucus or a change in the color of your mucus. ? · You are not getting better as expected. Where can you learn more? Go to http://gwendolyn-gay.info/. Enter H333 in the search box to learn more about \"Bronchitis: Care Instructions. \" Current as of: May 12, 2017 Content Version: 11.4 © 9592-8680 [employer]. Care instructions adapted under license by [employer] (which disclaims liability or warranty for this information).  If you have questions about a medical condition or this instruction, always ask your healthcare professional. [employer] disclaims any warranty or liability for your use of this information. Albuterol (By mouth) Albuterol (al-BUE-ter-ol) Treats bronchospasm. Brand Name(s):  
There may be other brand names for this medicine. When This Medicine Should Not Be Used: This medicine is not right for everyone. Do not use it if you had an allergic reaction to albuterol. How to Use This Medicine:  
Tablet, Long Acting Tablet, Liquid · Your doctor will tell you how much of this medicine to use. Do not use more medicine or use it more often than your doctor tells you to. · Measure the oral liquid medicine with a marked measuring spoon, oral syringe, or medicine cup. · Swallow the extended-release tablet whole. Do not crush, break, or chew it. · If you take the extended-release tablet, part of the tablet may pass into your stools. This is normal and is nothing to worry about. · Albuterol is sometimes used with other medicines, such as medicine that you inhale. Use all other medicines your doctor has prescribed as part of your combination treatment. · Missed dose: Take a dose as soon as you remember. If it is almost time for your next dose, wait until then and take a regular dose. Do not take extra medicine to make up for a missed dose. · Store Proventil® and Ventolin® at room temperature in a closed container, away from heat, moisture, and direct light. Store Volmax® in the refrigerator. Do not freeze. Drugs and Foods to Avoid: Ask your doctor or pharmacist before using any other medicine, including over-the-counter medicines, vitamins, and herbal products. · Some medicines can affect how albuterol works. Tell your doctor if you are taking any of the following: ¨ A diuretic (water pill) ¨ An MAO inhibitor (MAOI) or depression medicine within the past 14 days ¨ Blood pressure medicine ¨ Digoxin Warnings While Using This Medicine: · Tell your doctor if you are pregnant or breastfeeding, or if you have heart disease, high blood pressure, heart rhythm problems, seizures, thyroid problems, or diabetes. · Call your doctor if your symptoms do not improve or if they get worse. · Keep all medicine out of the reach of children. Never share your medicine with anyone. Possible Side Effects While Using This Medicine:  
Call your doctor right away if you notice any of these side effects: · Allergic reaction: Itching or hives, swelling in your face or hands, swelling or tingling in your mouth or throat, chest tightness, trouble breathing · Blistering, peeling, red skin rash · Chest pain · Fast, pounding, or uneven heartbeat · Muscle cramps, nausea, vomiting If you notice these less serious side effects, talk with your doctor: · Dry mouth or throat · Shakiness, restlessness, nervousness, excitement, or trouble sleeping If you notice other side effects that you think are caused by this medicine, tell your doctor. Call your doctor for medical advice about side effects. You may report side effects to FDA at 3-121-NEY-4944 © 2017 [facility] Information is for End User's use only and may not be sold, redistributed or otherwise used for commercial purposes. The above information is an  only. It is not intended as medical advice for individual conditions or treatments. Talk to your doctor, nurse or pharmacist before following any medical regimen to see if it is safe and effective for you. Using a Metered-Dose Inhaler: Care Instructions Your Care Instructions A metered-dose inhaler lets you breathe medicine into your lungs quickly. Inhaled medicine works faster than the same medicine in a pill. An inhaler allows you to take less medicine than you would need if you took it as a pill. \"Metered-dose\" means that the inhaler gives a measured amount of medicine each time you use it.  A metered-dose inhaler gives medicine in the form of a liquid mist. 
 Your doctor may want you to use a spacer with your inhaler. A spacer is a chamber that you attach to the inhaler. The chamber holds the medicine before you inhale it. That way, you can inhale the medicine in as many breaths as you need. Doctors recommend using a spacer with most metered-dose inhalers, especially those with corticosteroid medicines. Follow-up care is a key part of your treatment and safety. Be sure to make and go to all appointments, and call your doctor if you are having problems. It's also a good idea to know your test results and keep a list of the medicines you take. How can you care for yourself at home? To get started using your inhaler · Talk with your health care provider to be sure you are using your inhaler the right way. It might help if you practice using it in front of a mirror. Use the inhaler exactly as prescribed. · Check that you have the correct medicine. If you use more than one inhaler, put a label on each one. This will let you know which one to use at the right time. · Keep track of how much medicine is in the inhaler. Check the label to see how many doses are in the container. If you know how many puffs you can take, you can replace the inhaler before you run out. Ask your health care provider how you can keep track of how much medicine is left. · Use a spacer if you have problems pressing the inhaler and breathing in at the same time. You also may need a spacer if you are using corticosteroid medicines. · If you are using a corticosteroid inhaler, gargle and rinse out your mouth with water after use. Do not swallow the water. Swallowing the water will increase the chance that the medicine will get into your bloodstream. This may make it more likely that you will have side effects. To use a spacer with an inhaler 1. Shake the inhaler. Remove the inhaler cap, and place the mouthpiece of the inhaler into the spacer.  Check the inhaler instructions to see if you need to prime your inhaler before you use it. If it needs priming, follow the instructions on how to prime your inhaler. 2. Remove the cap from the spacer. 3. Hold the inhaler upright with the mouthpiece at the bottom. 4. Tilt your head back a little, and breathe out slowly and completely. 5. Place the spacer's mouthpiece in your mouth. 6. Press down on the inhaler to spray one puff of medicine into the spacer, and then start breathing in slowly. Wait to inhale until after you have pressed down on the inhaler. Some spacers have a whistle. If you hear it, you should breathe in more slowly. 7. Hold your breath for 10 seconds. This will let the medicine settle in your lungs. 8. If you need to take a second dose, wait 30 to 60 seconds to allow the inhaler valve to refill. To use an inhaler without a spacer 1. Shake the inhaler as directed. Remove the cap. Check the instructions to see if you need to prime your inhaler before you use it. If it needs priming, follow the instructions on how to prime your inhaler. 2. Hold the inhaler upright with the mouthpiece at the bottom. 3. Tilt your head back a little, and breathe out slowly and completely. 4. Position the inhaler in one of two ways: 
¨ You can place the inhaler in your mouth. This is easier for most people. And it lowers the risk that any of the medicine will get into your eyes. ¨ Or you can place the inhaler 1 to 2 inches in front of your open mouth, without closing your lips over it. Try to open your mouth as wide as you can. Placing the inhaler in front of your open mouth may be better for getting the medicine into your lungs. But some people may find this too hard to do. 5. Start taking slow, even breaths through your mouth. Press down on the inhaler once, then inhale fully. 6. Hold your breath for 10 seconds. This will let the medicine settle in your lungs.  
7. If you need to take a second dose, wait 30 to 60 seconds to allow the inhaler valve to refill. Where can you learn more? Go to http://gwendolyn-gay.info/. Enter K111 in the search box to learn more about \"Using a Metered-Dose Inhaler: Care Instructions. \" Current as of: May 12, 2017 Content Version: 11.4 © 2784-0072 [employer]. Care instructions adapted under license by [employer] (which disclaims liability or warranty for this information). If you have questions about a medical condition or this instruction, always ask your healthcare professional. [address] any warranty or liability for your use of this information. Introducing \[facility] [place] [facility]\"" & HEALTH SERVICES! Andrei Rebollar introduces [employer] patient portal. Now you can access parts of your medical record, email your doctor's office, and request medication refills online. 1. In your internet browser, go to [email or web address] by Q 2. Click on the First Time User? Click Here link in the Sign In box. You will see the New Member Sign Up page. 3. Enter your [employer] Access Code exactly as it appears below. You will not need to use this code after youve completed the sign-up process. If you do not sign up before the expiration date, you must request a new code. · [employer] Access Code: [email or web address] Expires: 6/17/2018  9:51 AM 
 
4. Enter the last four digits of your Social Security Number (xxxx) and Date of Birth (mm/dd/yyyy) as indicated and click Submit. You will be taken to the next sign-up page. 5. Create a [employer] ID. This will be your [employer] login ID and cannot be changed, so think of one that is secure and easy to remember. 6. Create a [employer] password. You can change your password at any time. 7. Enter your Password Reset Question and Answer. This can be used at a later time if you forget your password. 8. Enter your e-mail address. You will receive e-mail notification when new information is available in [address] 9. Click Sign Up. You can now view and download portions of your medical record. 10. Click the Download Summary menu link to download a portable copy of your medical information. If you have questions, please visit the Frequently Asked Questions section of the [employer] website. Remember, [employer] is NOT to be used for urgent needs. For medical emergencies, dial 911. Now available from your iPhone and Android! Please provide this summary of care documentation to your next provider. Your primary care clinician is listed as PAUL Medina. If you have any questions after today's visit, please call [phone].

## 2018-03-27 NOTE — PATIENT INSTRUCTIONS
Bronchitis: Care Instructions  Your Care Instructions    Bronchitis is inflammation of the bronchial tubes, which carry air to the lungs. The tubes swell and produce mucus, or phlegm. The mucus and inflamed bronchial tubes make you cough. You may have trouble breathing. Most cases of bronchitis are caused by viruses like those that cause colds. Antibiotics usually do not help and they may be harmful. Bronchitis usually develops rapidly and lasts about 2 to 3 weeks in otherwise healthy people. Follow-up care is a key part of your treatment and safety. Be sure to make and go to all appointments, and call your doctor if you are having problems. It's also a good idea to know your test results and keep a list of the medicines you take. How can you care for yourself at home? · Take all medicines exactly as prescribed. Call your doctor if you think you are having a problem with your medicine. · Get some extra rest.  · Take an over-the-counter pain medicine, such as acetaminophen (Tylenol), ibuprofen (Advil, Motrin), or naproxen (Aleve) to reduce fever and relieve body aches. Read and follow all instructions on the label. · Do not take two or more pain medicines at the same time unless the doctor told you to. Many pain medicines have acetaminophen, which is Tylenol. Too much acetaminophen (Tylenol) can be harmful. · Take an over-the-counter cough medicine that contains dextromethorphan to help quiet a dry, hacking cough so that you can sleep. Avoid cough medicines that have more than one active ingredient. Read and follow all instructions on the label. · Breathe moist air from a humidifier, hot shower, or sink filled with hot water. The heat and moisture will thin mucus so you can cough it out. · Do not smoke. Smoking can make bronchitis worse. If you need help quitting, talk to your doctor about stop-smoking programs and medicines. These can increase your chances of quitting for good.   When should you call for help? Call 911 anytime you think you may need emergency care. For example, call if:  ? · You have severe trouble breathing. ?Call your doctor now or seek immediate medical care if:  ? · You have new or worse trouble breathing. ? · You cough up dark brown or bloody mucus (sputum). ? · You have a new or higher fever. ? · You have a new rash. ? Watch closely for changes in your health, and be sure to contact your doctor if:  ? · You cough more deeply or more often, especially if you notice more mucus or a change in the color of your mucus. ? · You are not getting better as expected. Where can you learn more? Go to http://gwendolyn-gay.info/. Enter H333 in the search box to learn more about \"Bronchitis: Care Instructions. \"  Current as of: May 12, 2017  Content Version: 11.4  © 0842-5464 [employer]. Care instructions adapted under license by [employer] (which disclaims liability or warranty for this information). If you have questions about a medical condition or this instruction, always ask your healthcare professional. [place] [address] any warranty or liability for your use of this information. Albuterol (By mouth)   Albuterol (al-BUE-ter-ol)  Treats bronchospasm. Brand Name(s):   There may be other brand names for this medicine. When This Medicine Should Not Be Used: This medicine is not right for everyone. Do not use it if you had an allergic reaction to albuterol. How to Use This Medicine:   Tablet, Long Acting Tablet, Liquid  · Your doctor will tell you how much of this medicine to use. Do not use more medicine or use it more often than your doctor tells you to. · Measure the oral liquid medicine with a marked measuring spoon, oral syringe, or medicine cup. · Swallow the extended-release tablet whole. Do not crush, break, or chew it. · If you take the extended-release tablet, part of the tablet may pass into your stools. This is normal and is nothing to worry about. · Albuterol is sometimes used with other medicines, such as medicine that you inhale. Use all other medicines your doctor has prescribed as part of your combination treatment. · Missed dose: Take a dose as soon as you remember. If it is almost time for your next dose, wait until then and take a regular dose. Do not take extra medicine to make up for a missed dose. · Store Proventil® and Ventolin® at room temperature in a closed container, away from heat, moisture, and direct light. Store Volmax® in the refrigerator. Do not freeze. Drugs and Foods to Avoid:   Ask your doctor or pharmacist before using any other medicine, including over-the-counter medicines, vitamins, and herbal products. · Some medicines can affect how albuterol works. Tell your doctor if you are taking any of the following:   ¨ A diuretic (water pill)  ¨ An MAO inhibitor (MAOI) or depression medicine within the past 14 days  ¨ Blood pressure medicine  ¨ Digoxin  Warnings While Using This Medicine:   · Tell your doctor if you are pregnant or breastfeeding, or if you have heart disease, high blood pressure, heart rhythm problems, seizures, thyroid problems, or diabetes. · Call your doctor if your symptoms do not improve or if they get worse. · Keep all medicine out of the reach of children. Never share your medicine with anyone.   Possible Side Effects While Using This Medicine:   Call your doctor right away if you notice any of these side effects:  · Allergic reaction: Itching or hives, swelling in your face or hands, swelling or tingling in your mouth or throat, chest tightness, trouble breathing  · Blistering, peeling, red skin rash  · Chest pain  · Fast, pounding, or uneven heartbeat  · Muscle cramps, nausea, vomiting  If you notice these less serious side effects, talk with your doctor:   · Dry mouth or throat  · Shakiness, restlessness, nervousness, excitement, or trouble sleeping  If you notice other side effects that you think are caused by this medicine, tell your doctor. Call your doctor for medical advice about side effects. You may report side effects to FDA at 0-742-JWW-7293  © 2017 Lamberto Ansari Information is for End User's use only and may not be sold, redistributed or otherwise used for commercial purposes. The above information is an  only. It is not intended as medical advice for individual conditions or treatments. Talk to your doctor, nurse or pharmacist before following any medical regimen to see if it is safe and effective for you. Using a Metered-Dose Inhaler: Care Instructions  Your Care Instructions    A metered-dose inhaler lets you breathe medicine into your lungs quickly. Inhaled medicine works faster than the same medicine in a pill. An inhaler allows you to take less medicine than you would need if you took it as a pill. \"Metered-dose\" means that the inhaler gives a measured amount of medicine each time you use it. A metered-dose inhaler gives medicine in the form of a liquid mist.  Your doctor may want you to use a spacer with your inhaler. A spacer is a chamber that you attach to the inhaler. The chamber holds the medicine before you inhale it. That way, you can inhale the medicine in as many breaths as you need. Doctors recommend using a spacer with most metered-dose inhalers, especially those with corticosteroid medicines. Follow-up care is a key part of your treatment and safety. Be sure to make and go to all appointments, and call your doctor if you are having problems. It's also a good idea to know your test results and keep a list of the medicines you take. How can you care for yourself at home? To get started using your inhaler  · Talk with your health care provider to be sure you are using your inhaler the right way. It might help if you practice using it in front of a mirror. Use the inhaler exactly as prescribed.   · Check that you have the correct medicine. If you use more than one inhaler, put a label on each one. This will let you know which one to use at the right time. · Keep track of how much medicine is in the inhaler. Check the label to see how many doses are in the container. If you know how many puffs you can take, you can replace the inhaler before you run out. Ask your health care provider how you can keep track of how much medicine is left. · Use a spacer if you have problems pressing the inhaler and breathing in at the same time. You also may need a spacer if you are using corticosteroid medicines. · If you are using a corticosteroid inhaler, gargle and rinse out your mouth with water after use. Do not swallow the water. Swallowing the water will increase the chance that the medicine will get into your bloodstream. This may make it more likely that you will have side effects. To use a spacer with an inhaler  1. Shake the inhaler. Remove the inhaler cap, and place the mouthpiece of the inhaler into the spacer. Check the inhaler instructions to see if you need to prime your inhaler before you use it. If it needs priming, follow the instructions on how to prime your inhaler. 2. Remove the cap from the spacer. 3. Hold the inhaler upright with the mouthpiece at the bottom. 4. Tilt your head back a little, and breathe out slowly and completely. 5. Place the spacer's mouthpiece in your mouth. 6. Press down on the inhaler to spray one puff of medicine into the spacer, and then start breathing in slowly. Wait to inhale until after you have pressed down on the inhaler. Some spacers have a whistle. If you hear it, you should breathe in more slowly. 7. Hold your breath for 10 seconds. This will let the medicine settle in your lungs. 8. If you need to take a second dose, wait 30 to 60 seconds to allow the inhaler valve to refill. To use an inhaler without a spacer  1. Shake the inhaler as directed. Remove the cap.  Check the instructions to see if you need to prime your inhaler before you use it. If it needs priming, follow the instructions on how to prime your inhaler. 2. Hold the inhaler upright with the mouthpiece at the bottom. 3. Tilt your head back a little, and breathe out slowly and completely. 4. Position the inhaler in one of two ways:  ¨ You can place the inhaler in your mouth. This is easier for most people. And it lowers the risk that any of the medicine will get into your eyes. ¨ Or you can place the inhaler 1 to 2 inches in front of your open mouth, without closing your lips over it. Try to open your mouth as wide as you can. Placing the inhaler in front of your open mouth may be better for getting the medicine into your lungs. But some people may find this too hard to do. 5. Start taking slow, even breaths through your mouth. Press down on the inhaler once, then inhale fully. 6. Hold your breath for 10 seconds. This will let the medicine settle in your lungs. 7. If you need to take a second dose, wait 30 to 60 seconds to allow the inhaler valve to refill. Where can you learn more? Go to http://gwendolyn-gay.info/. Enter K111 in the search box to learn more about \"Using a Metered-Dose Inhaler: Care Instructions. \"  Current as of: May 12, 2017  Content Version: 11.4  © 5529-7486 [employer]. Care instructions adapted under license by [employer] (which disclaims liability or warranty for this information). If you have questions about a medical condition or this instruction, always ask your healthcare professional. [place] [address] any warranty or liability for your use of this information.

## 2018-04-23 ENCOUNTER — OFFICE VISIT (OUTPATIENT)
Dept: FAMILY MEDICINE CLINIC | Age: 83
End: 2018-04-23

## 2018-04-23 VITALS
HEIGHT: 66 IN | WEIGHT: 148.6 LBS | BODY MASS INDEX: 23.88 KG/M2 | HEART RATE: 78 BPM | SYSTOLIC BLOOD PRESSURE: 153 MMHG | TEMPERATURE: 96.5 F | RESPIRATION RATE: 16 BRPM | DIASTOLIC BLOOD PRESSURE: 46 MMHG

## 2018-04-23 DIAGNOSIS — R63.4 WEIGHT LOSS, UNINTENTIONAL: Primary | ICD-10-CM

## 2018-04-23 RX ORDER — LOSARTAN POTASSIUM 25 MG/1
25 TABLET ORAL DAILY
COMMUNITY
Start: 2018-04-18 | End: 2018-09-25 | Stop reason: ALTCHOICE

## 2018-04-23 NOTE — PATIENT INSTRUCTIONS
Be sure to drink TWO supplements per day (i.e. Boost/ensure/etc.)    If you would like to see the dietician just let us know. Abnormal Weight Loss: Care Instructions  Your Care Instructions    There are two types of weight loss-normal and abnormal. The normal kind happens when you are trying to lose weight by exercising more or eating less. The abnormal kind happens when you are not trying to lose weight. Many medical problems can cause abnormal weight loss. These include problems with your thyroid gland, long-term infections, mouth or throat problems that make it hard to eat, and digestive problems. They also include depression and cancer. Some medicines also may cause you to lose weight. You can work with your doctor to find the cause of your weight loss. You will probably need tests to do this. Follow-up care is a key part of your treatment and safety. Be sure to make and go to all appointments, and call your doctor if you are having problems. It's also a good idea to know your test results and keep a list of the medicines you take. How can you care for yourself at home? · Weigh yourself at the same time every day. It's best to do it first thing in the morning after you empty your bladder. Be sure to always wear the same amount of clothing. · Write down any changes in your weight and the possible causes. Discuss these with your doctor. · Your doctor may want you to change your diet. Do your best to follow his or her advice. · Ask your doctor if you should see a dietitian. This is a person who can help you plan meals that work best for your lifestyle. · Note any changes in bowel habits. These may include changes in how often you have a bowel movement. Other changes include the color and size of your stools and how solid they are. · If you are prescribed medicines, take them exactly as prescribed. Call your doctor if you think you are having a problem with your medicine.  You will get more details on the specific medicines your doctor prescribes. When should you call for help? Watch closely for changes in your health, and be sure to contact your doctor if:  ? · You do not get better as expected. ? · You continue to lose weight. Where can you learn more? Go to http://gwendolyn-gay.info/. Enter A790 in the search box to learn more about \"Abnormal Weight Loss: Care Instructions. \"  Current as of: October 13, 2016  Content Version: 11.4  © 6198-8592 [employer]wise[employer]orporated. Care instructions adapted under license by [employer] (which disclaims liability or warranty for this information). If you have questions about a medical condition or this instruction, always ask your healthcare professional. [address] any warranty or liability for your use of this information.

## 2018-04-23 NOTE — PROGRESS NOTES
1. Have you been to the ER, urgent care clinic since your last visit? Hospitalized since your last visit? No.     2. Have you seen or consulted any other health care providers outside of the [address] [place] [address] since your last visit? Include any pap smears or colon screening.  No.     Chief Complaint   Patient presents with    Other     Loss of taste sensation

## 2018-04-23 NOTE — MR AVS SNAPSHOT
[facility] [place] [address] [facility] [place] [address] 
[phone] Patient: Jose Enriquez MRN: [id number] EMC:[id number] Visit Information Date & Time Provider Department Dept. Phone Encounter #  
 4/23/2018  3:00 PM Ashley Mcmahon, [address] [place] [address] [phone] [id number] Follow-up Instructions Return in about 4 weeks (around 5/21/2018) for with labs prior - 30 minute slot. Your Appointments 6/7/2018  9:30 AM  
Follow Up with Ashley Mcmahon MD  
[place] [address] ([facility]) Appt Note: 4 mo f/u  
 [facility] [place]  
  
   
 [address] [facility] Upcoming Health Maintenance Date Due DTaP/Tdap/Td series (1 - Tdap) 11/14/1951 MEDICARE YEARLY EXAM 2/23/2019 GLAUCOMA SCREENING Q2Y 7/13/2019 Allergies as of 4/23/2018  Review Complete On: 4/23/2018 By: Ashley Mcmahon MD  
  
 Severity Noted Reaction Type Reactions Ace Inhibitors  11/29/2010    Cough Codeine  11/19/2010    Other (comments)  
 headache Effexor [Venlafaxine]  06/14/2017    Other (comments)  
 confusion Fosamax [Alendronate]  11/19/2010    Other (comments) peridontal pain Procardia [Nifedipine]  11/19/2010    Swelling Current Immunizations  Reviewed on 3/19/2018 Name Date Influenza High Dose Vaccine PF 9/7/2017, 11/15/2016 Influenza Vaccine 11/5/2014, 10/8/2013 Influenza Vaccine (Quad) 9/8/2015 12:00 PM  
 Pneumococcal Conjugate (PCV-13) 11/15/2016 ZZZ-RETIRED (DO NOT USE) Pneumococcal Vaccine (Unspecified Type) 11/7/2011 Not reviewed this visit Vitals BP Pulse Temp Resp Height(growth percentile) Weight(growth percentile) 153/46 78 96.5 °F (35.8 °C) (Oral) 16 5' 6\" (1.676 m) 148 lb 9.6 oz (67.4 kg) BMI Smoking Status 23.98 kg/m2 Former Smoker Vitals History BMI and BSA Data Body Mass Index Body Surface Area  
 23.98 kg/m 2 1.77 m 2 Preferred Pharmacy Pharmacy Name Phone [facility] [phone], [address] include 234 beds at the [facility] [address] [place] [address] [phone] Your Updated Medication List  
  
   
This list is accurate as of 4/23/18  3:46 PM.  Always use your most recent med list.  
  
  
  
  
 * albuterol 90 mcg/actuation inhaler Commonly known as:  PROVENTIL HFA, VENTOLIN HFA, PROAIR HFA Take 1 Puff by inhalation every four (4) hours as needed for Wheezing. * albuterol 90 mcg/actuation inhaler Commonly known as:  PROVENTIL HFA, VENTOLIN HFA, PROAIR HFA Take 1 Puff by inhalation every six (6) hours as needed for Wheezing. Or cough  
  
 amLODIPine 5 mg tablet Commonly known as:  Leward Sanders Take 5 mg by mouth daily. aspirin delayed-release 81 mg tablet Take 1 Tab by mouth daily. atorvastatin 40 mg tablet Commonly known as:  LIPITOR  
take 1 tablet by mouth once daily  
  
 calcium-cholecalciferol (D3) tablet Commonly known as:  CALCARB 600 WITH VITAMIN D Take 1 Tab by mouth two (2) times a day. clotrimazole 1 % topical cream  
Commonly known as:  Terra Kotlik Apply  to affected area two (2) times a day. Use for 2 weeks. cyanocobalamin 1,000 mcg tablet Commonly known as:  VITAMIN B-12 Take 1 Tab by mouth daily. diphenhydrAMINE 25 mg tablet Commonly known as:  BENADRYL Take 1 Tab by mouth nightly. fluticasone 50 mcg/actuation nasal spray Commonly known as:  [place] 2 Sprays by Both Nostrils route daily. losartan 25 mg tablet Commonly known as:  COZAAR Take 25 mg by mouth daily. metoprolol tartrate 25 mg tablet Commonly known as:  LOPRESSOR Take 12.5 mg by mouth daily. * Notice: This list has 2 medication(s) that are the same as other medications prescribed for you.  Read the directions carefully, and ask your doctor or other care provider to review them with you. Follow-up Instructions Return in about 4 weeks (around 5/21/2018) for with labs prior - 30 minute slot. Patient Instructions Be sure to drink TWO supplements per day (i.e. Boost/ensure/etc.) If you would like to see the dietician just let us know. Abnormal Weight Loss: Care Instructions Your Care Instructions There are two types of weight loss-normal and abnormal. The normal kind happens when you are trying to lose weight by exercising more or eating less. The abnormal kind happens when you are not trying to lose weight. Many medical problems can cause abnormal weight loss. These include problems with your thyroid gland, long-term infections, mouth or throat problems that make it hard to eat, and digestive problems. They also include depression and cancer. Some medicines also may cause you to lose weight. You can work with your doctor to find the cause of your weight loss. You will probably need tests to do this. Follow-up care is a key part of your treatment and safety. Be sure to make and go to all appointments, and call your doctor if you are having problems. It's also a good idea to know your test results and keep a list of the medicines you take. How can you care for yourself at home? · Weigh yourself at the same time every day. It's best to do it first thing in the morning after you empty your bladder. Be sure to always wear the same amount of clothing. · Write down any changes in your weight and the possible causes. Discuss these with your doctor. · Your doctor may want you to change your diet. Do your best to follow his or her advice. · Ask your doctor if you should see a dietitian. This is a person who can help you plan meals that work best for your lifestyle. · Note any changes in bowel habits.  These may include changes in how often you have a bowel movement. Other changes include the color and size of your stools and how solid they are. · If you are prescribed medicines, take them exactly as prescribed. Call your doctor if you think you are having a problem with your medicine. You will get more details on the specific medicines your doctor prescribes. When should you call for help? Watch closely for changes in your health, and be sure to contact your doctor if: 
? · You do not get better as expected. ? · You continue to lose weight. Where can you learn more? Go to http://gwendolyn-gay.info/. Enter A790 in the search box to learn more about \"Abnormal Weight Loss: Care Instructions. \" Current as of: October 13, 2016 Content Version: 11.4 © 9384-3780 Healthwise, Incorporated. Care instructions adapted under license by [employer] (which disclaims liability or warranty for this information). If you have questions about a medical condition or this instruction, always ask your healthcare professional. [place] [address] any warranty or liability for your use of this information. Introducing [facility]! Erika Lorenzo introduces [email or web address] patient portal. Now you can access parts of your medical record, email your doctor's office, and request medication refills online. 1. In your internet browser, go to [email or web address] 2. Click on the First Time User? Click Here link in the Sign In box. You will see the New Member Sign Up page. 3. Enter your [email or web address] Access Code exactly as it appears below. You will not need to use this code after youve completed the sign-up process. If you do not sign up before the expiration date, you must request a new code. · [email or web address] Access Code: [email or web address] Expires: 6/17/2018  9:51 AM 
 
4.  Enter the last four digits of your Social Security Number (xxxx) and Date of Birth (mm/dd/yyyy) as indicated and click Submit. You will be taken to the next sign-up page. 5. Create a LOOKSIMA ID. This will be your LOOKSIMA login ID and cannot be changed, so think of one that is secure and easy to remember. 6. Create a LOOKSIMA password. You can change your password at any time. 7. Enter your Password Reset Question and Answer. This can be used at a later time if you forget your password. 8. Enter your e-mail address. You will receive e-mail notification when new information is available in [address] 9. Click Sign Up. You can now view and download portions of your medical record. 10. Click the Download Summary menu link to download a portable copy of your medical information. If you have questions, please visit the Frequently Asked Questions section of the LOOKSIMA website. Remember, LOOKSIMA is NOT to be used for urgent needs. For medical emergencies, dial 911. Now available from your iPhone and Android! Please provide this summary of care documentation to your next provider. Your primary care clinician is listed as PAUL Medina. If you have any questions after today's visit, please call [phone].

## 2018-04-23 NOTE — PROGRESS NOTES
Elia Auguste is a 80 y.o. male and presents with Other (Loss of taste sensation) and Decreased Appetite       Subjective:    Decreased appetitite and dysgeusia-patient reports taste is fairly normal for foods he enjoys. He is not taking a nutritional supplement at this point. He reports his appetite is simply not what it used to be. No weakness. No nausea vomiting. No diarrhea no abdominal pain. No hot or cold intolerance. No increased RAM or cough. No f/c. No hot or cold intolerance. Assessment/Plan:    Decreased appetite and weight lossdiscussed with patient and daughter adding nutritional supplement such as boost or Ensure at least twice per day. Additionally discussed dietitian referral but the patient would like to hold off on this for now. We also discussed considering Megace if needed and reviewed potential side effects including edema or blood clots    RTC in 1 month with labs. Orders Placed This Encounter    losartan (COZAAR) 25 mg tablet     Sig: Take 25 mg by mouth daily. Diagnoses and all orders for this visit:    1. Weight loss, unintentional            ROS:  Negative except as mentioned above  Cardiac- no chest pain or palpitations  Pulmonary- no sob or wheezes  GI- no n/v or diarrhea. SH:  Social History   Substance Use Topics    Smoking status: Former Smoker     Years: 8.00     Quit date: 11/7/1953    Smokeless tobacco: Never Used    Alcohol use No         Medications/Allergies:  Current Outpatient Prescriptions on File Prior to Visit   Medication Sig Dispense Refill    albuterol (PROVENTIL HFA, VENTOLIN HFA, PROAIR HFA) 90 mcg/actuation inhaler Take 1 Puff by inhalation every six (6) hours as needed for Wheezing. Or cough 1 Inhaler 0    fluticasone (FLONASE) 50 mcg/actuation nasal spray 2 Sprays by Both Nostrils route daily. 1 Bottle 6    diphenhydrAMINE (BENADRYL) 25 mg tablet Take 1 Tab by mouth nightly.  30 Tab 3    atorvastatin (LIPITOR) 40 mg tablet take 1 tablet by mouth once daily 30 Tab 11    amLODIPine (NORVASC) 5 mg tablet Take 5 mg by mouth daily.  clotrimazole (LOTRIMIN) 1 % topical cream Apply  to affected area two (2) times a day. Use for 2 weeks. 30 g 1    metoprolol tartrate (LOPRESSOR) 25 mg tablet Take 12.5 mg by mouth daily.  aspirin delayed-release 81 mg tablet Take 1 Tab by mouth daily. 90 Tab 3    albuterol (PROVENTIL HFA, VENTOLIN HFA, PROAIR HFA) 90 mcg/actuation inhaler Take 1 Puff by inhalation every four (4) hours as needed for Wheezing. 1 Inhaler 1    cyanocobalamin (VITAMIN B-12) 1,000 mcg tablet Take 1 Tab by mouth daily. 90 Tab 3    calcium-cholecalciferol, D3, (CALCARB 600 WITH VITAMIN D) tablet Take 1 Tab by mouth two (2) times a day. 180 Tab 3     No current facility-administered medications on file prior to visit. Allergies   Allergen Reactions    Ace Inhibitors Cough    Codeine Other (comments)     headache    Effexor [Venlafaxine] Other (comments)     confusion    Fosamax [Alendronate] Other (comments)     peridontal pain    Procardia [Nifedipine] Swelling       Objective:  Visit Vitals    /46    Pulse 78    Temp 96.5 °F (35.8 °C) (Oral)    Resp 16    Ht 5' 6\" (1.676 m)    Wt 148 lb 9.6 oz (67.4 kg)    BMI 23.98 kg/m2    Body mass index is 23.98 kg/(m^2). Constitutional: Well developed, nourished, no distress, alert   CV: S1, S2.  RRR. No edema. Pulm: No abnormalities on inspection. Clear to auscultation bilaterally. No wheezing/rhonchi. Normal effort. GI: Soft, nontender, nondistended. Normal active bowel sounds. No  masses on palpation. No hepatosplenomegaly.

## 2018-05-22 ENCOUNTER — OFFICE VISIT (OUTPATIENT)
Dept: FAMILY MEDICINE CLINIC | Age: 83
End: 2018-05-22

## 2018-05-22 VITALS — BODY MASS INDEX: 23.95 KG/M2 | HEIGHT: 66 IN | WEIGHT: 149 LBS | RESPIRATION RATE: 16 BRPM

## 2018-05-22 DIAGNOSIS — I35.1 AORTIC VALVE INSUFFICIENCY, ETIOLOGY OF CARDIAC VALVE DISEASE UNSPECIFIED: ICD-10-CM

## 2018-05-22 DIAGNOSIS — I10 ESSENTIAL HYPERTENSION: ICD-10-CM

## 2018-05-22 DIAGNOSIS — E78.2 MIXED HYPERLIPIDEMIA: Primary | ICD-10-CM

## 2018-05-22 RX ORDER — ATORVASTATIN CALCIUM 40 MG/1
TABLET, FILM COATED ORAL
Qty: 90 TAB | Refills: 3 | Status: SHIPPED | OUTPATIENT
Start: 2018-05-22

## 2018-05-22 NOTE — PROGRESS NOTES
Elia Auguste is a 80 y.o. male and presents with Follow Up Chronic Condition and Decreased Appetite (still not eating much. )       Subjective: Aortic root dilation and aortic regurgitation- saw cardiology yesterday. Hyperlipidemia- pt is not sure what happened to his lipitor but he does not have it. Was refilled in January but his pharmacy closed. htn- taking all meds. Thinking about living with his son or daughter. Assessment/Plan:    Diagnoses and all orders for this visit:    1. Mixed hyperlipidemia    2. Aortic valve insufficiency, etiology of cardiac valve disease unspecified- continue to f/u with cardiolgy for this. Comments:  seeing Dr. Cesilia Anderson and [facility]    3. Essential hypertension- continue meds. Other orders  -     atorvastatin (LIPITOR) 40 mg tablet; take 1 tablet by mouth once daily          RTC in 1 month    Orders Placed This Encounter    atorvastatin (LIPITOR) 40 mg tablet     Sig: take 1 tablet by mouth once daily     Dispense:  90 Tab     Refill:  3       Diagnoses and all orders for this visit:    1. Mixed hyperlipidemia    2. Aortic valve insufficiency, etiology of cardiac valve disease unspecified  Comments:  seeing Dr. Cesilia Anderson and [facility]    3. Essential hypertension    Other orders  -     atorvastatin (LIPITOR) 40 mg tablet; take 1 tablet by mouth once daily            ROS:  Negative except as mentioned above  Cardiac- no chest pain or palpitations  Pulmonary- no sob or wheezes  GI- no n/v or diarrhea. SH:  Social History   Substance Use Topics    Smoking status: Former Smoker     Years: 8.00     Quit date: 11/7/1953    Smokeless tobacco: Never Used    Alcohol use No         Medications/Allergies:  Current Outpatient Prescriptions on File Prior to Visit   Medication Sig Dispense Refill    losartan (COZAAR) 25 mg tablet Take 25 mg by mouth daily.  fluticasone (FLONASE) 50 mcg/actuation nasal spray 2 Sprays by Both Nostrils route daily.  1 Bottle 6    diphenhydrAMINE (BENADRYL) 25 mg tablet Take 1 Tab by mouth nightly. 30 Tab 3    atorvastatin (LIPITOR) 40 mg tablet take 1 tablet by mouth once daily 30 Tab 11    amLODIPine (NORVASC) 5 mg tablet Take 5 mg by mouth daily.  clotrimazole (LOTRIMIN) 1 % topical cream Apply  to affected area two (2) times a day. Use for 2 weeks. 30 g 1    metoprolol tartrate (LOPRESSOR) 25 mg tablet Take 12.5 mg by mouth daily.  aspirin delayed-release 81 mg tablet Take 1 Tab by mouth daily. 90 Tab 3    albuterol (PROVENTIL HFA, VENTOLIN HFA, PROAIR HFA) 90 mcg/actuation inhaler Take 1 Puff by inhalation every four (4) hours as needed for Wheezing. 1 Inhaler 1    cyanocobalamin (VITAMIN B-12) 1,000 mcg tablet Take 1 Tab by mouth daily. 90 Tab 3    calcium-cholecalciferol, D3, (CALCARB 600 WITH VITAMIN D) tablet Take 1 Tab by mouth two (2) times a day. 180 Tab 3     No current facility-administered medications on file prior to visit. Allergies   Allergen Reactions    Ace Inhibitors Cough    Codeine Other (comments)     headache    Effexor [Venlafaxine] Other (comments)     confusion    Fosamax [Alendronate] Other (comments)     peridontal pain    Procardia [Nifedipine] Swelling       Objective:  Visit Vitals    Resp 16    Ht 5' 6\" (1.676 m)    Wt 149 lb (67.6 kg)    BMI 24.05 kg/m2    Body mass index is 24.05 kg/(m^2). Constitutional: Well developed, nourished, no distress, alert   CV: S1, S2.  RRR. No murmurs/rubs. No thrills palpated. No carotid bruits. Intact distal pulses. No edema. Pulm: No abnormalities on inspection. Clear to auscultation bilaterally. No wheezing/rhonchi. Normal effort. GI: Soft, nontender, nondistended. Normal active bowel sounds. No  masses on palpation. No hepatosplenomegaly.

## 2018-05-22 NOTE — PATIENT INSTRUCTIONS
Aortic Valve Regurgitation: Care Instructions  Your Care Instructions    The aortic valve works like a one-way gate. It opens so that blood can leave the heart and flow to the rest of the body. When the heart rests between beats, the aortic valve closes to keep blood from flowing backward into the heart. When the aortic valve does not close properly, some of the blood leaks back (regurgitates) through the valve into the heart. Then your heart has to work harder to pump blood throughout your body. You can have this condition for many years before it gets worse and you have symptoms. Your doctor will monitor your heart, and you may need to take medicines. If the disease becomes severe, you may need to have surgery to replace the valve. Follow-up care is a key part of your treatment and safety. Be sure to make and go to all appointments, and call your doctor if you are having problems. It's also a good idea to know your test results and keep a list of the medicines you take. How can you care for yourself at home? · Be safe with medicines. Take your medicines exactly as prescribed. Call your doctor if you think you are having a problem with your medicine. You will get more details on the specific medicines your doctor prescribes. · If you have an artificial valve, you may need to take antibiotics before you have certain dental or surgical procedures. The antibiotics help prevent an infection in your heart called endocarditis. · Eat heart-healthy foods such as fruits, vegetables, whole grains, fish, lean meats, and low-fat or nonfat dairy foods. Limit sodium, sugar, and alcohol. · Be active. Ask your doctor what type and level of exercise is safe for you. Walking is a good choice. You also may want to swim, bike, or do other activities. Talk with your doctor before doing strenuous activities or weightlifting. · Do not smoke. Smoking can make heart problems worse.  If you need help quitting, talk to your doctor about stop-smoking programs and medicines. These can increase your chances of quitting for good. · Stay at a healthy weight. Lose weight if you need to. · Avoid colds and flu. Get a pneumococcal vaccine shot. If you have had one before, ask your doctor whether you need another dose. Get a flu vaccine every year. If you must be around people with colds or flu, wash your hands often. · Take care of your teeth and gums. Get regular dental checkups. Good dental health is important because bacteria can spread from infected teeth and gums to the heart valves. When should you call for help? Call 911 anytime you think you may need emergency care. For example, call if:  ? · You have signs of acute aortic valve regurgitation such as:  ¨ Severe shortness of breath. ¨ A rapid heart rate. ¨ Lightheadedness. ? · You have symptoms of sudden heart failure such as:  ¨ Severe trouble breathing. ¨ Coughing up pink, foamy mucus. ¨ A new irregular or rapid heartbeat. ?Call your doctor now or seek immediate medical care if:  ? · You have new or increased shortness of breath. ? · You are dizzy or lightheaded, or you feel like you may faint. ? · You have sudden weight gain, such as more than 2 to 3 pounds in a day or 5 pounds in a week. (Your doctor may suggest a different range of weight gain.)   ? · You have increased swelling in your legs, ankles, or feet. ? · You are suddenly so tired or weak that you cannot do your usual activities. ? Watch closely for changes in your health, and be sure to contact your doctor if you have any problems. Where can you learn more? Go to http://gwendolyn-gay.info/. Enter O755 in the search box to learn more about \"Aortic Valve Regurgitation: Care Instructions. \"  Current as of: September 21, 2016  Content Version: 11.4  © 3548-0926 [employer]  Care instructions adapted under license by [employer] (which disclaims liability or warranty for this information). If you have questions about a medical condition or this instruction, always ask your healthcare professional. [place] [address] any warranty or liability for your use of this information.

## 2018-05-22 NOTE — PROGRESS NOTES
1. Have you been to the ER, urgent care clinic since your last visit? Hospitalized since your last visit? No.     2. Have you seen or consulted any other health care providers outside of the [address] [place] [address] since your last visit? Include any pap smears or colon screening. Yes, saw his cardiologist yesterday 5/21/2018. Chief Complaint   Patient presents with    Follow Up Chronic Condition    Decreased Appetite     still not eating much.

## 2018-06-07 ENCOUNTER — OFFICE VISIT (OUTPATIENT)
Dept: FAMILY MEDICINE CLINIC | Age: 83
End: 2018-06-07

## 2018-06-07 VITALS
HEIGHT: 66 IN | TEMPERATURE: 96.7 F | DIASTOLIC BLOOD PRESSURE: 47 MMHG | HEART RATE: 61 BPM | SYSTOLIC BLOOD PRESSURE: 142 MMHG | WEIGHT: 150 LBS | BODY MASS INDEX: 24.11 KG/M2 | RESPIRATION RATE: 16 BRPM

## 2018-06-07 DIAGNOSIS — E78.2 MIXED HYPERLIPIDEMIA: Primary | ICD-10-CM

## 2018-06-07 DIAGNOSIS — I10 ESSENTIAL HYPERTENSION: ICD-10-CM

## 2018-06-07 DIAGNOSIS — I35.1 AORTIC VALVE INSUFFICIENCY, ETIOLOGY OF CARDIAC VALVE DISEASE UNSPECIFIED: ICD-10-CM

## 2018-06-07 NOTE — PROGRESS NOTES
1. Have you been to the ER, urgent care clinic since your last visit? Hospitalized since your last visit? No.     2. Have you seen or consulted any other health care providers outside of the [address] [place] [address] since your last visit? Include any pap smears or colon screening.  No.    Chief Complaint   Patient presents with    Follow Up Chronic Condition    Other     Aortic Valve Regurgitation    Decreased Appetite

## 2018-06-07 NOTE — PATIENT INSTRUCTIONS
Aortic Valve Replacement: Before Your Surgery  What is aortic valve replacement? Aortic valve replacement gives you a new aortic heart valve. The new valve may be mechanical or made of animal tissue, often from a pig. Your doctor will talk with you before surgery about which type of valve is best for you. The aortic valve opens and closes to keep blood flowing in the proper direction through your heart. When the aortic valve does not close properly, it's called aortic valve regurgitation. If the valve is very tight and narrow, it's called aortic valve stenosis. In both of these cases, blood does not flow through the heart the right way. You will be asleep during the surgery. Your doctor will make a cut in the skin over your breastbone (sternum). This cut is called an incision. Then the doctor will cut through your sternum to reach your heart. The doctor will connect you to a heart-lung bypass machine. It adds oxygen to your blood and moves the blood through your body. This machine will allow the doctor to stop your heartbeat and replace the valve. After the doctor has replaced your aortic valve, he or she will restart your heartbeat. Then the doctor will use wire to put your sternum back together. Your incision will be closed with stitches or staples. The wire will stay in your chest. The incision will leave a scar that may fade with time. You will stay in the hospital for 3 to 8 days after surgery. Follow-up care is a key part of your treatment and safety. Be sure to make and go to all appointments, and call your doctor if you are having problems. It's also a good idea to know your test results and keep a list of the medicines you take. What happens before surgery? ?Surgery can be stressful. This information will help you understand what you can expect. And it will help you safely prepare for surgery. ? Preparing for surgery  ?  · Understand exactly what surgery is planned, along with the risks, benefits, and other options. · Tell your doctors ALL the medicines, vitamins, supplements, and herbal remedies you take. Some of these can increase the risk of bleeding or interact with anesthesia. ? · If you take blood thinners, such as warfarin (Coumadin), clopidogrel (Plavix), or aspirin, be sure to talk to your doctor. He or she will tell you if you should stop taking these medicines before your surgery. Make sure that you understand exactly what your doctor wants you to do.   ? · Your doctor will tell you which medicines to take or stop before your surgery. You may need to stop taking certain medicines a week or more before surgery. So talk to your doctor as soon as you can.   ? · If you have an advance directive, let your doctor know. It may include a living will and a durable power of  for health care. Bring a copy to the hospital. If you don't have one, you may want to prepare one. It lets your doctor and loved ones know your health care wishes. Doctors advise that everyone prepare these papers before any type of surgery or procedure. What happens on the day of surgery? · Follow the instructions exactly about when to stop eating and drinking. If you don't, your surgery may be canceled. If your doctor told you to take your medicines on the day of surgery, take them with only a sip of water. ? · Take a bath or shower before you come in for your surgery. Do not apply lotions, perfumes, deodorants, or nail polish. ? · Do not shave the surgical site yourself. ? · Take off all jewelry and piercings. And take out contact lenses, if you wear them. ? At the hospital or surgery center   · Bring a picture ID. ? · The area for surgery is often marked to make sure there are no errors. ? · You will be kept comfortable and safe by your anesthesia provider. You will be asleep during the surgery. ? · The surgery will take about 3 to 5 hours. ? After surgery  ?  · You will go to the intensive care unit (ICU) right after surgery. You will probably stay in the ICU for 1 or 2 days before you go to your regular hospital room. ? · You will have a breathing tube down your throat. This is usually removed within 6 hours after surgery. You will not be able to talk or drink liquids while the tube is in your throat. After the tube is removed, your throat will feel dry and scratchy. Your nurse will tell you when it is safe to drink liquids again. ? · As you wake up in the ICU, the nurse will check to be sure you are stable and comfortable. It is important for you to tell your doctor and nurse how you feel and ask questions about any concerns you may have. ? · You will have a thin plastic tube in a vein in your neck. This tube is called a catheter. It is used to keep track of how well your heart is working. This is usually removed in 1 to 3 days. ? · You will have chest tubes to drain fluid and blood after surgery. The fluid and extra blood are normal. They usually last for only a few days. The chest tubes are usually removed in 1 or 2 days. ? · You will have several thin wires coming out of your chest near your incision. These wires can help keep your heartbeat steady after surgery. They will be removed before you go home. Going home   · Be sure you have someone to drive you home. Anesthesia and pain medicine make it unsafe for you to drive. ? · You will be given more specific instructions about recovering from your surgery. They will cover things like diet, wound care, follow-up care, driving, and getting back to your normal routine. When should you call your doctor? · You have questions or concerns. ? · You don't understand how to prepare for your surgery. ? · You become ill before the surgery (such as fever, flu, or a cold). ? · You need to reschedule or have changed your mind about having the surgery. Where can you learn more? Go to http://gwendolyn-gay.info/.   Enter Y421 in the search box to learn more about \"Aortic Valve Replacement: Before Your Surgery. \"  Current as of: September 21, 2016  Content Version: 11.4  © 2627-4568 [employer]. Care instructions adapted under license by [employer] (which disclaims liability or warranty for this information). If you have questions about a medical condition or this instruction, always ask your healthcare professional. [place] [address] any warranty or liability for your use of this information.

## 2018-06-07 NOTE — MR AVS SNAPSHOT
Shelley De Anda [place] [address] [facility] [place] [address] [id number] [phone] Patient: Juan Carlos Lindquist MRN: [id number] Visit Information Date & Time Provider Department Dept. Phone Encounter #  
 6/7/2018  9:30 AM Manny Perry, [address] [place] [address] [phone] [id number] Follow-up Instructions Return in about 2 months (around 8/7/2018) for 30 minute slot- cancel any other visits. .  
  
Your Appointments 2/22/2019 11:15 AM  
Office Visit with Manny Perry MD  
[place] [address] [address] [place] [address]) Appt Note: 295 [facility] 68 [facility] [place] [address] 500 [facility]  
  
   
 [facility] Upcoming Health Maintenance Date Due DTaP/Tdap/Td series (1 - Tdap) 11/14/1951 Influenza Age 5 to Adult 8/1/2018 MEDICARE YEARLY EXAM 2/23/2019 GLAUCOMA SCREENING Q2Y 7/13/2019 Allergies as of 6/7/2018  Review Complete On: 6/7/2018 By: Manny Perry MD  
  
 Severity Noted Reaction Type Reactions Ace Inhibitors  11/29/2010    Cough Codeine  11/19/2010    Other (comments)  
 headache Effexor [Venlafaxine]  06/14/2017    Other (comments)  
 confusion Fosamax [Alendronate]  11/19/2010    Other (comments) peridontal pain Procardia [Nifedipine]  11/19/2010    Swelling Current Immunizations  Reviewed on 3/19/2018 Name Date Influenza High Dose Vaccine PF 9/7/2017, 11/15/2016 Influenza Vaccine 11/5/2014, 10/8/2013 Influenza Vaccine (Quad) 9/8/2015 12:00 PM  
 Pneumococcal Conjugate (PCV-13) 11/15/2016 ZZZ-RETIRED (DO NOT USE) Pneumococcal Vaccine (Unspecified Type) 11/7/2011 Not reviewed this visit You Were Diagnosed With   
  
 Codes Comments Mixed hyperlipidemia    -  Primary ICD-10-CM: M09.2 ICD-9-CM: 272.2 Essential hypertension     ICD-10-CM: I10 
ICD-9-CM: 401.9 Aortic valve insufficiency, etiology of cardiac valve disease unspecified     ICD-10-CM: I35.1 ICD-9-CM: 424.1 Vitals BP Pulse Temp Resp Height(growth percentile) Weight(growth percentile) 142/47 61 96.7 °F (35.9 °C) (Oral) 16 5' 6\" (1.676 m) 150 lb (68 kg) BMI Smoking Status 24.21 kg/m2 Former Smoker Vitals History BMI and BSA Data Body Mass Index Body Surface Area  
 24.21 kg/m 2 1.78 m 2 Preferred Pharmacy Pharmacy Name Phone [phone] [address], [phone] [facility] [address] [facility] [phone] Your Updated Medication List  
  
   
This list is accurate as of 6/7/18 10:06 AM.  Always use your most recent med list.  
  
  
  
  
 albuterol 90 mcg/actuation inhaler Commonly known as:  PROVENTIL HFA, VENTOLIN HFA, PROAIR HFA Take 1 Puff by inhalation every four (4) hours as needed for Wheezing. amLODIPine 5 mg tablet Commonly known as:  Mike Lute Take 5 mg by mouth daily. aspirin delayed-release 81 mg tablet Take 1 Tab by mouth daily. atorvastatin 40 mg tablet Commonly known as:  LIPITOR  
take 1 tablet by mouth once daily  
  
 calcium-cholecalciferol (D3) tablet Commonly known as:  CALCARB 600 WITH VITAMIN D Take 1 Tab by mouth two (2) times a day. clotrimazole 1 % topical cream  
Commonly known as:  Bubba Goodmakenna Apply  to affected area two (2) times a day. Use for 2 weeks. cyanocobalamin 1,000 mcg tablet Commonly known as:  VITAMIN B-12 Take 1 Tab by mouth daily. diphenhydrAMINE 25 mg tablet Commonly known as:  BENADRYL Take 1 Tab by mouth nightly. fluticasone 50 mcg/actuation nasal spray Commonly known as:  Merna Mealy 2 Sprays by Both Nostrils route daily. losartan 25 mg tablet Commonly known as:  COZAAR Take 25 mg by mouth daily. metoprolol tartrate 25 mg tablet Commonly known as:  LOPRESSOR Take 12.5 mg by mouth daily. Follow-up Instructions Return in about 2 months (around 8/7/2018) for 30 minute slot- cancel any other visits. Artbalbir Frost To-Do List   
 06/07/2018 Lab:  CBC W/O DIFF   
  
 06/07/2018 Lab:  METABOLIC PANEL, BASIC Patient Instructions Aortic Valve Replacement: Before Your Surgery What is aortic valve replacement? Aortic valve replacement gives you a new aortic heart valve. The new valve may be mechanical or made of animal tissue, often from a pig. Your doctor will talk with you before surgery about which type of valve is best for you. The aortic valve opens and closes to keep blood flowing in the proper direction through your heart. When the aortic valve does not close properly, it's called aortic valve regurgitation. If the valve is very tight and narrow, it's called aortic valve stenosis. In both of these cases, blood does not flow through the heart the right way. You will be asleep during the surgery. Your doctor will make a cut in the skin over your breastbone (sternum). This cut is called an incision. Then the doctor will cut through your sternum to reach your heart. The doctor will connect you to a heart-lung bypass machine. It adds oxygen to your blood and moves the blood through your body. This machine will allow the doctor to stop your heartbeat and replace the valve. After the doctor has replaced your aortic valve, he or she will restart your heartbeat. Then the doctor will use wire to put your sternum back together. Your incision will be closed with stitches or staples. The wire will stay in your chest. The incision will leave a scar that may fade with time. You will stay in the hospital for 3 to 8 days after surgery. Follow-up care is a key part of your treatment and safety. Be sure to make and go to all appointments, and call your doctor if you are having problems. It's also a good idea to know your test results and keep a list of the medicines you take. What happens before surgery? ?Surgery can be stressful. This information will help you understand what you can expect. And it will help you safely prepare for surgery. ? Preparing for surgery ? · Understand exactly what surgery is planned, along with the risks, benefits, and other options. · Tell your doctors ALL the medicines, vitamins, supplements, and herbal remedies you take. Some of these can increase the risk of bleeding or interact with anesthesia. ? · If you take blood thinners, such as warfarin (Coumadin), clopidogrel (Plavix), or aspirin, be sure to talk to your doctor. He or she will tell you if you should stop taking these medicines before your surgery. Make sure that you understand exactly what your doctor wants you to do.  
? · Your doctor will tell you which medicines to take or stop before your surgery. You may need to stop taking certain medicines a week or more before surgery. So talk to your doctor as soon as you can.  
? · If you have an advance directive, let your doctor know. It may include a living will and a durable power of  for health care. Bring a copy to the hospital. If you don't have one, you may want to prepare one. It lets your doctor and loved ones know your health care wishes. Doctors advise that everyone prepare these papers before any type of surgery or procedure. What happens on the day of surgery? · Follow the instructions exactly about when to stop eating and drinking. If you don't, your surgery may be canceled. If your doctor told you to take your medicines on the day of surgery, take them with only a sip of water. ? · Take a bath or shower before you come in for your surgery. Do not apply lotions, perfumes, deodorants, or nail polish. ? · Do not shave the surgical site yourself. ? · Take off all jewelry and piercings. And take out contact lenses, if you wear them. ? At the hospital or surgery center · Bring a picture ID. ? · The area for surgery is often marked to make sure there are no errors. ? · You will be kept comfortable and safe by your anesthesia provider. You will be asleep during the surgery. ? · The surgery will take about 3 to 5 hours. ? After surgery ? · You will go to the intensive care unit (ICU) right after surgery. You will probably stay in the ICU for 1 or 2 days before you go to your regular hospital room. ? · You will have a breathing tube down your throat. This is usually removed within 6 hours after surgery. You will not be able to talk or drink liquids while the tube is in your throat. After the tube is removed, your throat will feel dry and scratchy. Your nurse will tell you when it is safe to drink liquids again. ? · As you wake up in the ICU, the nurse will check to be sure you are stable and comfortable. It is important for you to tell your doctor and nurse how you feel and ask questions about any concerns you may have. ? · You will have a thin plastic tube in a vein in your neck. This tube is called a catheter. It is used to keep track of how well your heart is working. This is usually removed in 1 to 3 days. ? · You will have chest tubes to drain fluid and blood after surgery. The fluid and extra blood are normal. They usually last for only a few days. The chest tubes are usually removed in 1 or 2 days. ? · You will have several thin wires coming out of your chest near your incision. These wires can help keep your heartbeat steady after surgery. They will be removed before you go home. Going home · Be sure you have someone to drive you home. Anesthesia and pain medicine make it unsafe for you to drive. ? · You will be given more specific instructions about recovering from your surgery. They will cover things like diet, wound care, follow-up care, driving, and getting back to your normal routine. When should you call your doctor? · You have questions or concerns. ? · You don't understand how to prepare for your surgery. ? · You become ill before the surgery (such as fever, flu, or a cold). ? · You need to reschedule or have changed your mind about having the surgery. Where can you learn more? Go to http://gwendolyn-gay.info/. Enter G499 in the search box to learn more about \"Aortic Valve Replacement: Before Your Surgery. \" Current as of: September 21, 2016 Content Version: 11.4 © 6525-1493 [employer]Learning. Care instructions adapted under license by [employer] (which disclaims liability or warranty for this information). If you have questions about a medical condition or this instruction, always ask your healthcare professional. [address] any warranty or liability for your use of this information. Introducing [facility]! Román Aaron introduces Bandwagon patient portal. Now you can access parts of your medical record, email your doctor's office, and request medication refills online. 1. In your internet browser, go to [email or web address] 2. Click on the First Time User? Click Here link in the Sign In box. You will see the New Member Sign Up page. 3. Enter your Bandwagon Access Code exactly as it appears below. You will not need to use this code after youve completed the sign-up process. If you do not sign up before the expiration date, you must request a new code. · Bandwagon Access Code: [email or web address] Expires: 6/17/2018  9:51 AM 
 
4. Enter the last four digits of your Social Security Number (xxxx) and Date of Birth (mm/dd/yyyy) as indicated and click Submit. You will be taken to the next sign-up page. 5. Create a Bandwagon ID. This will be your Bandwagon login ID and cannot be changed, so think of one that is secure and easy to remember. 6. Create a Poolamit password. You can change your password at any time. 7. Enter your Password Reset Question and Answer. This can be used at a later time if you forget your password. 8. Enter your e-mail address. You will receive e-mail notification when new information is available in [address] 9. Click Sign Up. You can now view and download portions of your medical record. 10. Click the Download Summary menu link to download a portable copy of your medical information. If you have questions, please visit the Frequently Asked Questions section of the [employer] website. Remember, [employer] is NOT to be used for urgent needs. For medical emergencies, dial 911. Now available from your iPhone and Android! Please provide this summary of care documentation to your next provider. Your primary care clinician is listed as PAUL Medina. If you have any questions after today's visit, please call [phone].

## 2018-06-29 NOTE — PROGRESS NOTES
Arelis Ramirez is a 80 y.o. male and presents with Follow Up Chronic Condition; Other (Aortic Valve Regurgitation); and Decreased Appetite       Subjective:    Hyperlipidemia- pt is back on lipitor at this point. No myalgias or abdominal pain. Aortic regurgitation- seeing cardiology and \"valve job\" planned for November pt reports. HTN- taking meds- no cp or sob. No edema. Assessment/Plan: Aortic regurg- keep cardiology follow up. Hyperlipidemia- continue lipitor. No changes  HTN- sl elevated systolic but low diastolic pressure so will not change meds. RTC in 2 months. Orders Placed This Encounter    CBC W/O DIFF     Standing Status:   Future     Standing Expiration Date:   5/5/6565    METABOLIC PANEL, BASIC     Standing Status:   Future     Standing Expiration Date:   6/8/2019     Diagnoses and all orders for this visit:    1. Mixed hyperlipidemia    2. Essential hypertension    3. Aortic valve insufficiency, etiology of cardiac valve disease unspecified          ROS:  Negative except as mentioned above  Cardiac- no chest pain or palpitations  Pulmonary- no sob or wheezes  GI- no n/v or diarrhea. SH:  Social History   Substance Use Topics    Smoking status: Former Smoker     Years: 8.00     Quit date: 11/7/1953    Smokeless tobacco: Never Used    Alcohol use No         Medications/Allergies:  Current Outpatient Prescriptions on File Prior to Visit   Medication Sig Dispense Refill    atorvastatin (LIPITOR) 40 mg tablet take 1 tablet by mouth once daily 90 Tab 3    losartan (COZAAR) 25 mg tablet Take 25 mg by mouth daily.  fluticasone (FLONASE) 50 mcg/actuation nasal spray 2 Sprays by Both Nostrils route daily. 1 Bottle 6    diphenhydrAMINE (BENADRYL) 25 mg tablet Take 1 Tab by mouth nightly. 30 Tab 3    amLODIPine (NORVASC) 5 mg tablet Take 5 mg by mouth daily.  clotrimazole (LOTRIMIN) 1 % topical cream Apply  to affected area two (2) times a day. Use for 2 weeks.  30 g 1  metoprolol tartrate (LOPRESSOR) 25 mg tablet Take 12.5 mg by mouth daily.  aspirin delayed-release 81 mg tablet Take 1 Tab by mouth daily. 90 Tab 3    albuterol (PROVENTIL HFA, VENTOLIN HFA, PROAIR HFA) 90 mcg/actuation inhaler Take 1 Puff by inhalation every four (4) hours as needed for Wheezing. 1 Inhaler 1    cyanocobalamin (VITAMIN B-12) 1,000 mcg tablet Take 1 Tab by mouth daily. 90 Tab 3    calcium-cholecalciferol, D3, (CALCARB 600 WITH VITAMIN D) tablet Take 1 Tab by mouth two (2) times a day. 180 Tab 3     No current facility-administered medications on file prior to visit. Allergies   Allergen Reactions    Ace Inhibitors Cough    Codeine Other (comments)     headache    Effexor [Venlafaxine] Other (comments)     confusion    Fosamax [Alendronate] Other (comments)     peridontal pain    Procardia [Nifedipine] Swelling       Objective:  Visit Vitals    /47    Pulse 61    Temp 96.7 °F (35.9 °C) (Oral)    Resp 16    Ht 5' 6\" (1.676 m)    Wt 150 lb (68 kg)    BMI 24.21 kg/m2    Body mass index is 24.21 kg/(m^2). Constitutional: Well developed, nourished, no distress, alert   CV: S1, S2.  RRR. Michaelyn Daily Left chronic 1-2+ edema. Pulm: No abnormalities on inspection. Clear to auscultation bilaterally. No wheezing/rhonchi. Normal effort. GI: Soft, nontender, nondistended. Normal active bowel sounds. Michaelyn Daily

## 2018-07-18 ENCOUNTER — HOSPITAL ENCOUNTER (OUTPATIENT)
Dept: LAB | Age: 83
Discharge: HOME | End: 2018-07-18
Payer: MEDICARE

## 2018-07-18 DIAGNOSIS — R63.4 WEIGHT LOSS, UNINTENTIONAL: ICD-10-CM

## 2018-07-18 DIAGNOSIS — I10 ESSENTIAL HYPERTENSION: ICD-10-CM

## 2018-07-18 DIAGNOSIS — I35.1 AORTIC VALVE INSUFFICIENCY, ETIOLOGY OF CARDIAC VALVE DISEASE UNSPECIFIED: ICD-10-CM

## 2018-07-18 LAB
ALBUMIN SERPL-MCNC: 3.4 G/DL (ref 3.4–5)
ALBUMIN/GLOB SERPL: 1.3 {RATIO} (ref 0.8–1.7)
ALP SERPL-CCNC: 80 U/L (ref 45–117)
ALT SERPL-CCNC: 12 U/L (ref 16–61)
ANION GAP SERPL CALC-SCNC: 5 MMOL/L (ref 3–18)
AST SERPL-CCNC: 15 U/L (ref 15–37)
BILIRUB SERPL-MCNC: 0.7 MG/DL (ref 0.2–1)
BUN SERPL-MCNC: 17 MG/DL (ref 7–18)
BUN/CREAT SERPL: 13 (ref 12–20)
CALCIUM SERPL-MCNC: 7.8 MG/DL (ref 8.5–10.1)
CHLORIDE SERPL-SCNC: 109 MMOL/L (ref 100–108)
CO2 SERPL-SCNC: 29 MMOL/L (ref 21–32)
CREAT SERPL-MCNC: 1.32 MG/DL (ref 0.6–1.3)
ERYTHROCYTE [DISTWIDTH] IN BLOOD BY AUTOMATED COUNT: 12.9 % (ref 11.6–14.5)
GLOBULIN SER CALC-MCNC: 2.6 G/DL (ref 2–4)
GLUCOSE SERPL-MCNC: 79 MG/DL (ref 74–99)
HCT VFR BLD AUTO: 38.7 % (ref 36–48)
HGB BLD-MCNC: 12.7 G/DL (ref 13–16)
MCH RBC QN AUTO: 32 PG (ref 24–34)
MCHC RBC AUTO-ENTMCNC: 32.8 G/DL (ref 31–37)
MCV RBC AUTO: 97.5 FL (ref 74–97)
PLATELET # BLD AUTO: 163 K/UL (ref 135–420)
PMV BLD AUTO: 9.6 FL (ref 9.2–11.8)
POTASSIUM SERPL-SCNC: 4.2 MMOL/L (ref 3.5–5.5)
PREALB SERPL-MCNC: 19.6 MG/DL (ref 20–40)
PROT SERPL-MCNC: 6 G/DL (ref 6.4–8.2)
RBC # BLD AUTO: 3.97 M/UL (ref 4.7–5.5)
SODIUM SERPL-SCNC: 143 MMOL/L (ref 136–145)
TSH SERPL DL<=0.05 MIU/L-ACNC: 2.5 UIU/ML (ref 0.36–3.74)
WBC # BLD AUTO: 9.6 K/UL (ref 4.6–13.2)

## 2018-07-18 PROCEDURE — 85027 COMPLETE CBC AUTOMATED: CPT | Performed by: INTERNAL MEDICINE

## 2018-07-18 PROCEDURE — 84134 ASSAY OF PREALBUMIN: CPT | Performed by: INTERNAL MEDICINE

## 2018-07-18 PROCEDURE — 80053 COMPREHEN METABOLIC PANEL: CPT | Performed by: INTERNAL MEDICINE

## 2018-07-18 PROCEDURE — 84443 ASSAY THYROID STIM HORMONE: CPT | Performed by: INTERNAL MEDICINE

## 2018-07-18 PROCEDURE — 36415 COLL VENOUS BLD VENIPUNCTURE: CPT | Performed by: INTERNAL MEDICINE

## 2018-08-13 ENCOUNTER — OFFICE VISIT (OUTPATIENT)
Dept: FAMILY MEDICINE CLINIC | Age: 83
End: 2018-08-13

## 2018-08-13 VITALS
TEMPERATURE: 96 F | DIASTOLIC BLOOD PRESSURE: 49 MMHG | HEART RATE: 65 BPM | WEIGHT: 147 LBS | RESPIRATION RATE: 16 BRPM | BODY MASS INDEX: 23.63 KG/M2 | HEIGHT: 66 IN | SYSTOLIC BLOOD PRESSURE: 152 MMHG

## 2018-08-13 DIAGNOSIS — E83.51 HYPOCALCEMIA: ICD-10-CM

## 2018-08-13 DIAGNOSIS — E53.8 B12 DEFICIENCY: ICD-10-CM

## 2018-08-13 DIAGNOSIS — D53.9 MACROCYTIC ANEMIA: Primary | ICD-10-CM

## 2018-08-13 NOTE — PROGRESS NOTES
1. Have you been to the ER, urgent care clinic since your last visit? Hospitalized since your last visit? No    2. Have you seen or consulted any other health care providers outside of the [facility] since your last visit? Include any pap smears or colon screening.  No

## 2018-08-13 NOTE — MR AVS SNAPSHOT
Shelley De Anda Lima [address] [facility] [place] [id number] 
[phone] Patient: Kevin Haywood MRN: [id number] DUV:[id number] Visit Information Date & Time Provider Department Dept. Phone Encounter #  
 8/13/2018 10:30 AM Astrid Bumpers, [address] [facility] [phone] [id number] Follow-up Instructions Return in about 2 months (around 10/13/2018) for 30 minute slot. Your Appointments 2/22/2019 11:15 AM  
Office Visit with Astrid Bumpers, MD  
[address]) Appt Note: 295 [facility] 68 [facility] [place] [address]  
  
   
 [address] [place] [address] [address] Upcoming Health Maintenance Date Due DTaP/Tdap/Td series (1 - Tdap) 11/14/1951 Influenza Age 5 to Adult 8/1/2018 MEDICARE YEARLY EXAM 2/23/2019 GLAUCOMA SCREENING Q2Y 7/13/2019 Allergies as of 8/13/2018  Review Complete On: 8/13/2018 By: Astrid Bumpers, MD  
  
 Severity Noted Reaction Type Reactions Ace Inhibitors  11/29/2010    Cough Codeine  11/19/2010    Other (comments)  
 headache Effexor [Venlafaxine]  06/14/2017    Other (comments)  
 confusion Fosamax [Alendronate]  11/19/2010    Other (comments) peridontal pain Procardia [Nifedipine]  11/19/2010    Swelling Current Immunizations  Reviewed on 3/19/2018 Name Date Influenza High Dose Vaccine PF 9/7/2017, 11/15/2016 Influenza Vaccine 11/5/2014, 10/8/2013 Influenza Vaccine (Quad) 9/8/2015 12:00 PM  
 Pneumococcal Conjugate (PCV-13) 11/15/2016 ZZZ-RETIRED (DO NOT USE) Pneumococcal Vaccine (Unspecified Type) 11/7/2011 Not reviewed this visit You Were Diagnosed With   
  
 Codes Comments Macrocytic anemia    -  Primary ICD-10-CM: D53.9 ICD-9-CM: 281.9 B12 deficiency     ICD-10-CM: E53.8 ICD-9-CM: 266.2 Hypocalcemia     ICD-10-CM: E83.51 
ICD-9-CM: 275.41 Vitals BP Pulse Temp Resp Height(growth percentile) Weight(growth percentile) 152/49 65 96 °F (35.6 °C) (Oral) 16 5' 6\" (1.676 m) 147 lb (66.7 kg) BMI Smoking Status 23.73 kg/m2 Former Smoker Vitals History BMI and BSA Data Body Mass Index Body Surface Area  
 23.73 kg/m 2 1.76 m 2 Preferred Pharmacy Pharmacy Name Phone [facility] [phone] [place] [address], [address] [place] [address] [phone] Your Updated Medication List  
  
   
This list is accurate as of 8/13/18 11:10 AM.  Always use your most recent med list.  
  
  
  
  
 albuterol 90 mcg/actuation inhaler Commonly known as:  PROVENTIL HFA, VENTOLIN HFA, PROAIR HFA Take 1 Puff by inhalation every four (4) hours as needed for Wheezing. amLODIPine 5 mg tablet Commonly known as:  Kodak Conine Take 5 mg by mouth daily. aspirin delayed-release 81 mg tablet Take 1 Tab by mouth daily. atorvastatin 40 mg tablet Commonly known as:  LIPITOR  
take 1 tablet by mouth once daily  
  
 calcium-cholecalciferol (D3) tablet Commonly known as:  CALCARB 600 WITH VITAMIN D Take 1 Tab by mouth two (2) times a day. clotrimazole 1 % topical cream  
Commonly known as:  Corky  Apply  to affected area two (2) times a day. Use for 2 weeks. cyanocobalamin 1,000 mcg tablet Commonly known as:  VITAMIN B-12 Take 1 Tab by mouth daily. diphenhydrAMINE 25 mg tablet Commonly known as:  BENADRYL Take 1 Tab by mouth nightly. fluticasone 50 mcg/actuation nasal spray Commonly known as:  Esther Griffiths 2 Sprays by Both Nostrils route daily. losartan 25 mg tablet Commonly known as:  COZAAR Take 25 mg by mouth daily. metoprolol tartrate 25 mg tablet Commonly known as:  LOPRESSOR Take 12.5 mg by mouth daily. Follow-up Instructions Return in about 2 months (around 10/13/2018) for 30 minute slot. To-Do List   
 08/13/2018 Lab: CALCIUM, IONIZED   
  
 08/13/2018 Lab:  FOLATE   
  
 08/13/2018 Lab:  METABOLIC PANEL, BASIC   
  
 08/13/2018 Lab:  VITAMIN B12   
  
 08/13/2018 Lab:  VITAMIN D, 25 HYDROXY   
  
 08/13/2018 Lab:  VITAMIN D, 25 HYDROXY Patient Instructions Vitamin B12: About This Test 
What is it? This blood test measures the amount of vitamin B12 in your blood. Your body needs this B vitamin to make blood cells and to keep your nervous system healthy. Why is this test done? This test is used to: · Check for vitamin B12 deficiency anemia, especially in those who have had stomach or intestinal surgery or who have small intestine problems or a family history of this anemia. · Diagnose the cause of certain types of anemia. · Help find the cause of dementia or other nervous system symptoms, such as tingling or numbness of the arms or legs. How can you prepare for the test? 
· In general, you don't need to prepare before having this test. Your doctor may give you some specific instructions. What happens during the test? 
· A health professional takes a sample of your blood. What else should you know about the test? 
· Your results will include an explanation of what a \"normal\" result is. This is called a \"reference range. \" It is just a guide. Your doctor will evaluate your results based on your health and other factors. This means that a value that falls outside the normal values listed may still be normal for you. · Vitamin B12 is usually measured at the same time as folic acid is tested, because a lack of either one can lead to a form of anemia called megaloblastic anemia. How long does the test take? · The test will take a few minutes. What happens after the test? 
· You will probably be able to go home right away. · You can go back to your usual activities right away. Follow-up care is a key part of your treatment and safety.  Be sure to make and go to all appointments, and call your doctor if you are having problems. It's also a good idea to keep a list of the medicines you take. Ask your doctor when you can expect to have your test results. Where can you learn more? Go to http://gwendolyn-gay.info/. Enter Q213 in the search box to learn more about \"Vitamin B12: About This Test.\" Current as of: October 9, 2017 Content Version: 11.7 © 7121-9209 [employer]. Care instructions adapted under license by [employer] (which disclaims liability or warranty for this information). If you have questions about a medical condition or this instruction, always ask your healthcare professional. [address] any warranty or liability for your use of this information. Vitamin B-12 (Cyanocobalamin) (By mouth) Cyanocobalamin (vbt-tj-qi-koe-BAL-a-min) Supplies your body with vitamin B12. You might need a vitamin B12 supplement if your body has trouble using the vitamin B12 in the food you eat. You also might need vitamin B12 if you are a vegetarian, if you have had part of your stomach removed, or for other reasons. Prevents pernicious anemia, a blood problem caused by a lack of vitamin B12. Cyanocobalamin is another name for vitamin B12. Brand Name(s): B-12-SL, Finest Nutrition B-12, [facility] Vitamin B12, [facility] Vitamin Health B-12, Leader Natural Vitamin B12, Trevon Natural B-12, Nature's Blend B12, Optimum Vitamin B-12, PharmAssure Vitamin B-12, [facility] Vitamin B-12, [place] Naturals B12, Twelve Resin-K, Vitamist  There may be other brand names for this medicine. When This Medicine Should Not Be Used: You should not use this medicine if you have had an allergic reaction to vitamin B12 (cyanocobalamin), hydroxycobalamin, or cobalt (a metal). How to Use This Medicine:  
Capsule, Tablet, Long Acting Tablet, Lozenge, Dissolving Tablet, Liquid, Spray · Your doctor will tell you how much medicine to use. Do not use more than directed. · Carefully follow your doctor's instructions about any special diet. · Follow the instructions on the medicine label if you are using this medicine without a prescription. · Measure the oral liquid medicine with a marked measuring spoon, oral syringe, or medicine cup. You might need to shake the liquid just before you use it. You may mix the oral liquid in some juice if you prefer. · Place the lozenge in your mouth or under your tongue and let it melt. Some brands of lozenges can also be swallowed. You might need to hold the medicine in your mouth for 30 seconds before you swallow it. Follow the directions on the medicine label. · Place the sublingual tablet under your tongue and let it melt. Some brands can also be swallowed. You might need to hold the medicine in your mouth for at least 30 seconds before you swallow. Follow the directions on the medicine label. · Spray the mouth spray on the inside of your cheek. Hold the medicine in your mouth for about 30 sections, then swallow. · Swallow the extended-release tablet whole. Do not crush, break, or chew it. If a dose is missed: · Take a dose as soon as you remember. If it is almost time for your next dose, wait until then and take a regular dose. Do not take extra medicine to make up for a missed dose. How to Store and Dispose of This Medicine: · Store the medicine in a closed container at room temperature, away from heat, moisture, and direct light. · Ask your pharmacist, doctor, or health caregiver about the best way to dispose of any outdated medicine or medicine no longer needed. · Keep all medicine out of the reach of children. Never share your medicine with anyone. Drugs and Foods to Avoid: Ask your doctor or pharmacist before using any other medicine, including over-the-counter medicines, vitamins, and herbal products. · Make sure your doctor knows if you are also using folic acid (folate) supplements. Warnings While Using This Medicine: · Make sure your doctor knows if you are pregnant or breast feeding, or if you have liver disease, kidney disease, or Jim's disease (an eye disease). Tell your doctor if you have low levels of iron in your blood. · Your doctor might need to check your blood at regular visits while you are using this medicine. Be sure to keep all appointments. Possible Side Effects While Using This Medicine:  
Call your doctor right away if you notice any of these side effects: · Allergic reaction: Itching or hives, swelling in your face or hands, swelling or tingling in your mouth or throat, chest tightness, trouble breathing If you notice these less serious side effects, talk with your doctor: · Diarrhea. · Mild skin itching. If you notice other side effects that you think are caused by this medicine, tell your doctor. Call your doctor for medical advice about side effects. You may report side effects to FDA at 7-221-FDA-9105 © 2017 [address] Information is for End User's use only and may not be sold, redistributed or otherwise used for commercial purposes. The above information is an  only. It is not intended as medical advice for individual conditions or treatments. Talk to your doctor, nurse or pharmacist before following any medical regimen to see if it is safe and effective for you. Introducing [facility]! Christal Martin introduces [employer] patient portal. Now you can access parts of your medical record, email your doctor's office, and request medication refills online. 1. In your internet browser, go to [email or web address] 2. Click on the First Time User? Click Here link in the Sign In box. You will see the New Member Sign Up page. 3. Enter your [employer] Access Code exactly as it appears below.  You will not need to use this code after youve completed the sign-up process. If you do not sign up before the expiration date, you must request a new code. · [employer] Access Code: [email or web address] Expires: 9/26/2018  8:28 AM 
 
4. Enter the last four digits of your Social Security Number (xxxx) and Date of Birth (mm/dd/yyyy) as indicated and click Submit. You will be taken to the next sign-up page. 5. Create a [employer] ID. This will be your [employer] login ID and cannot be changed, so think of one that is secure and easy to remember. 6. Create a [employer] password. You can change your password at any time. 7. Enter your Password Reset Question and Answer. This can be used at a later time if you forget your password. 8. Enter your e-mail address. You will receive e-mail notification when new information is available in [address]. 9. Click Sign Up. You can now view and download portions of your medical record. 10. Click the Download Summary menu link to download a portable copy of your medical information. If you have questions, please visit the Frequently Asked Questions section of the [employer] website. Remember, [employer] is NOT to be used for urgent needs. For medical emergencies, dial 911. Now available from your iPhone and Android! Please provide this summary of care documentation to your next provider. Your primary care clinician is listed as PAUL Medina. If you have any questions after today's visit, please call [phone].

## 2018-08-13 NOTE — PROGRESS NOTES
Lisa Moseley is a 80 y.o. male and presents with Follow Up Chronic Condition       Subjective:    Hyperlipidemia- pt is back on lipitor at this point. No myalgias or abdominal pain. Aortic regurgitation- seeing cardiology and \"valve job\" planned for November pt reports. HTN- taking meds- no cp or sob. No edema.    macrocytic anemia - no dietary changes. Assessment/Plan: Aortic regurg- keep cardiology follow up. Hyperlipidemia- continue lipitor. No changes  HTN- sl elevated systolic but low diastolic pressure so will not change meds.    macrocytic anemia- mild- check B12 again and folate. Hypocalcemia- check vit D and phos and ionized ca. RTC in 2 months. Orders Placed This Encounter    VITAMIN B12     Standing Status:   Future     Standing Expiration Date:   8/14/2019    FOLATE     Standing Status:   Future     Standing Expiration Date:   8/14/2019    VITAMIN D, 25 HYDROXY     Standing Status:   Future     Standing Expiration Date:   8/13/2019    VITAMIN D, 25 HYDROXY     Standing Status:   Future     Standing Expiration Date:   4/31/7147    METABOLIC PANEL, BASIC     Standing Status:   Future     Standing Expiration Date:   8/14/2019    CALCIUM, IONIZED     Standing Status:   Future     Standing Expiration Date:   8/14/2019 12/2/2016  6:08 PM - [place], [facility]   Component Results   Component Value Flag Ref Range Units Status   Vitamin B12              Diagnoses and all orders for this visit:    1. Macrocytic anemia  -     VITAMIN B12; Future  -     FOLATE; Future    2. B12 deficiency  -     VITAMIN B12; Future  -     FOLATE; Future    3. Hypocalcemia  -     VITAMIN D, 25 HYDROXY; Future  -     VITAMIN D, 25 HYDROXY; Future  -     METABOLIC PANEL, BASIC; Future  -     CALCIUM, IONIZED; Future          ROS:  Negative except as mentioned above  Cardiac- no chest pain or palpitations  Pulmonary- no sob or wheezes  GI- no n/v or diarrhea.       SH:  Social History Substance Use Topics    Smoking status: Former Smoker     Years: 8.00     Quit date: 11/7/1953    Smokeless tobacco: Never Used    Alcohol use No         Medications/Allergies:  Current Outpatient Prescriptions on File Prior to Visit   Medication Sig Dispense Refill    atorvastatin (LIPITOR) 40 mg tablet take 1 tablet by mouth once daily 90 Tab 3    clotrimazole (LOTRIMIN) 1 % topical cream Apply  to affected area two (2) times a day. Use for 2 weeks. 30 g 1    metoprolol tartrate (LOPRESSOR) 25 mg tablet Take 12.5 mg by mouth daily.  aspirin delayed-release 81 mg tablet Take 1 Tab by mouth daily. 90 Tab 3    cyanocobalamin (VITAMIN B-12) 1,000 mcg tablet Take 1 Tab by mouth daily. 90 Tab 3    calcium-cholecalciferol, D3, (CALCARB 600 WITH VITAMIN D) tablet Take 1 Tab by mouth two (2) times a day. 180 Tab 3    losartan (COZAAR) 25 mg tablet Take 25 mg by mouth daily.  fluticasone (FLONASE) 50 mcg/actuation nasal spray 2 Sprays by Both Nostrils route daily. 1 Bottle 6    diphenhydrAMINE (BENADRYL) 25 mg tablet Take 1 Tab by mouth nightly. 30 Tab 3    amLODIPine (NORVASC) 5 mg tablet Take 5 mg by mouth daily.  albuterol (PROVENTIL HFA, VENTOLIN HFA, PROAIR HFA) 90 mcg/actuation inhaler Take 1 Puff by inhalation every four (4) hours as needed for Wheezing. 1 Inhaler 1     No current facility-administered medications on file prior to visit. Allergies   Allergen Reactions    Ace Inhibitors Cough    Codeine Other (comments)     headache    Effexor [Venlafaxine] Other (comments)     confusion    Fosamax [Alendronate] Other (comments)     peridontal pain    Procardia [Nifedipine] Swelling       Objective:  Visit Vitals    /49    Pulse 65    Temp 96 °F (35.6 °C) (Oral)    Resp 16    Ht 5' 6\" (1.676 m)    Wt 147 lb (66.7 kg)    BMI 23.73 kg/m2    Body mass index is 23.73 kg/(m^2). Constitutional: Well developed, nourished, no distress, alert   CV: S1, S2.  RRR.   No murmurs/rubs. No edema. Pulm: No abnormalities on inspection. Clear to auscultation bilaterally. No wheezing/rhonchi. Normal effort. GI: Soft, nontender, nondistended. Normal active bowel sounds. No  masses on palpation. No hepatosplenomegaly.

## 2018-08-13 NOTE — PATIENT INSTRUCTIONS
Vitamin B12: About This Test  What is it? This blood test measures the amount of vitamin B12 in your blood. Your body needs this B vitamin to make blood cells and to keep your nervous system healthy. Why is this test done? This test is used to:  · Check for vitamin B12 deficiency anemia, especially in those who have had stomach or intestinal surgery or who have small intestine problems or a family history of this anemia. · Diagnose the cause of certain types of anemia. · Help find the cause of dementia or other nervous system symptoms, such as tingling or numbness of the arms or legs. How can you prepare for the test?  · In general, you don't need to prepare before having this test. Your doctor may give you some specific instructions. What happens during the test?  · A health professional takes a sample of your blood. What else should you know about the test?  · Your results will include an explanation of what a \"normal\" result is. This is called a \"reference range. \" It is just a guide. Your doctor will evaluate your results based on your health and other factors. This means that a value that falls outside the normal values listed may still be normal for you. · Vitamin B12 is usually measured at the same time as folic acid is tested, because a lack of either one can lead to a form of anemia called megaloblastic anemia. How long does the test take? · The test will take a few minutes. What happens after the test?  · You will probably be able to go home right away. · You can go back to your usual activities right away. Follow-up care is a key part of your treatment and safety. Be sure to make and go to all appointments, and call your doctor if you are having problems. It's also a good idea to keep a list of the medicines you take. Ask your doctor when you can expect to have your test results. Where can you learn more? Go to http://gwendolyn-gay.info/.   Enter Q213 in the search box to learn more about \"Vitamin B12: About This Test.\"  Current as of: October 9, 2017  Content Version: 11.7  © 8138-4175 [employer]. Care instructions adapted under license by [employer] (which disclaims liability or warranty for this information). If you have questions about a medical condition or this instruction, always ask your healthcare professional. [address] any warranty or liability for your use of this information. Vitamin B-12 (Cyanocobalamin) (By mouth)   Cyanocobalamin (nso-nw-dl-koe-BAL-a-min)  Supplies your body with vitamin B12. You might need a vitamin B12 supplement if your body has trouble using the vitamin B12 in the food you eat. You also might need vitamin B12 if you are a vegetarian, if you have had part of your stomach removed, or for other reasons. Prevents pernicious anemia, a blood problem caused by a lack of vitamin B12. Cyanocobalamin is another name for vitamin B12. Brand Name(s): B-12-SL, [email or web address] Nutrition B-12, [facility] Vitamin B12, [facility] Vitamin Health B-12, Leader Natural Vitamin B12, Trevon Natural B-12, Nature's Blend B12, Optimum Vitamin B-12, PharmAssure Vitamin B-12, [facility] Vitamin B-12, [place] Naturals B12, Twelve Resin-K, Vitamist    There may be other brand names for this medicine. When This Medicine Should Not Be Used: You should not use this medicine if you have had an allergic reaction to vitamin B12 (cyanocobalamin), hydroxycobalamin, or cobalt (a metal). How to Use This Medicine:   Capsule, Tablet, Long Acting Tablet, Lozenge, Dissolving Tablet, Liquid, Spray  · Your doctor will tell you how much medicine to use. Do not use more than directed. · Carefully follow your doctor's instructions about any special diet. · Follow the instructions on the medicine label if you are using this medicine without a prescription.   · Measure the oral liquid medicine with a marked measuring spoon, oral syringe, or medicine cup. You might need to shake the liquid just before you use it. You may mix the oral liquid in some juice if you prefer. · Place the lozenge in your mouth or under your tongue and let it melt. Some brands of lozenges can also be swallowed. You might need to hold the medicine in your mouth for 30 seconds before you swallow it. Follow the directions on the medicine label. · Place the sublingual tablet under your tongue and let it melt. Some brands can also be swallowed. You might need to hold the medicine in your mouth for at least 30 seconds before you swallow. Follow the directions on the medicine label. · Spray the mouth spray on the inside of your cheek. Hold the medicine in your mouth for about 30 sections, then swallow. · Swallow the extended-release tablet whole. Do not crush, break, or chew it. If a dose is missed:   · Take a dose as soon as you remember. If it is almost time for your next dose, wait until then and take a regular dose. Do not take extra medicine to make up for a missed dose. How to Store and Dispose of This Medicine:   · Store the medicine in a closed container at room temperature, away from heat, moisture, and direct light. · Ask your pharmacist, doctor, or health caregiver about the best way to dispose of any outdated medicine or medicine no longer needed. · Keep all medicine out of the reach of children. Never share your medicine with anyone. Drugs and Foods to Avoid:   Ask your doctor or pharmacist before using any other medicine, including over-the-counter medicines, vitamins, and herbal products. · Make sure your doctor knows if you are also using folic acid (folate) supplements. Warnings While Using This Medicine:   · Make sure your doctor knows if you are pregnant or breast feeding, or if you have liver disease, kidney disease, or Jim's disease (an eye disease).  Tell your doctor if you have low levels of iron in your blood.  · Your doctor might need to check your blood at regular visits while you are using this medicine. Be sure to keep all appointments. Possible Side Effects While Using This Medicine:   Call your doctor right away if you notice any of these side effects:  · Allergic reaction: Itching or hives, swelling in your face or hands, swelling or tingling in your mouth or throat, chest tightness, trouble breathing  If you notice these less serious side effects, talk with your doctor:   · Diarrhea. · Mild skin itching. If you notice other side effects that you think are caused by this medicine, tell your doctor. Call your doctor for medical advice about side effects. You may report side effects to FDA at 5-377-FDA-6357  © 2017 [address] Information is for End User's use only and may not be sold, redistributed or otherwise used for commercial purposes. The above information is an  only. It is not intended as medical advice for individual conditions or treatments. Talk to your doctor, nurse or pharmacist before following any medical regimen to see if it is safe and effective for you.

## 2018-08-14 ENCOUNTER — HOSPITAL ENCOUNTER (OUTPATIENT)
Dept: LAB | Age: 83
Discharge: HOME | End: 2018-08-14
Payer: MEDICARE

## 2018-08-14 DIAGNOSIS — E53.8 B12 DEFICIENCY: ICD-10-CM

## 2018-08-14 DIAGNOSIS — D53.9 MACROCYTIC ANEMIA: ICD-10-CM

## 2018-08-14 DIAGNOSIS — E83.51 HYPOCALCEMIA: ICD-10-CM

## 2018-08-14 LAB
ANION GAP SERPL CALC-SCNC: 7 MMOL/L (ref 3–18)
BUN SERPL-MCNC: 19 MG/DL (ref 7–18)
BUN/CREAT SERPL: 15 (ref 12–20)
CALCIUM SERPL-MCNC: 8.6 MG/DL (ref 8.5–10.1)
CHLORIDE SERPL-SCNC: 106 MMOL/L (ref 100–108)
CO2 SERPL-SCNC: 29 MMOL/L (ref 21–32)
CREAT SERPL-MCNC: 1.28 MG/DL (ref 0.6–1.3)
FOLATE SERPL-MCNC: 10.3 NG/ML (ref 3.1–17.5)
GLUCOSE SERPL-MCNC: 82 MG/DL (ref 74–99)
POTASSIUM SERPL-SCNC: 4.3 MMOL/L (ref 3.5–5.5)
SODIUM SERPL-SCNC: 142 MMOL/L (ref 136–145)
VIT B12 SERPL-MCNC: 956 PG/ML (ref 211–911)

## 2018-08-14 PROCEDURE — 80048 BASIC METABOLIC PNL TOTAL CA: CPT | Performed by: INTERNAL MEDICINE

## 2018-08-14 PROCEDURE — 82330 ASSAY OF CALCIUM: CPT | Performed by: INTERNAL MEDICINE

## 2018-08-14 PROCEDURE — 82746 ASSAY OF FOLIC ACID SERUM: CPT | Performed by: INTERNAL MEDICINE

## 2018-08-14 PROCEDURE — 82607 VITAMIN B-12: CPT | Performed by: INTERNAL MEDICINE

## 2018-08-14 PROCEDURE — 36415 COLL VENOUS BLD VENIPUNCTURE: CPT | Performed by: INTERNAL MEDICINE

## 2018-08-14 PROCEDURE — 82306 VITAMIN D 25 HYDROXY: CPT | Performed by: INTERNAL MEDICINE

## 2018-08-15 LAB
25(OH)D3 SERPL-MCNC: 51.1 NG/ML (ref 30–100)
CA-I SERPL ISE-MCNC: 4.8 MG/DL (ref 4.5–5.6)

## 2018-09-25 ENCOUNTER — OFFICE VISIT (OUTPATIENT)
Dept: FAMILY MEDICINE CLINIC | Age: 83
End: 2018-09-25

## 2018-09-25 VITALS
HEART RATE: 65 BPM | HEIGHT: 66 IN | RESPIRATION RATE: 16 BRPM | SYSTOLIC BLOOD PRESSURE: 125 MMHG | BODY MASS INDEX: 23.46 KG/M2 | DIASTOLIC BLOOD PRESSURE: 39 MMHG | WEIGHT: 146 LBS | TEMPERATURE: 96.1 F

## 2018-09-25 DIAGNOSIS — E83.51 HYPOCALCEMIA: ICD-10-CM

## 2018-09-25 DIAGNOSIS — I95.9 HYPOTENSION, UNSPECIFIED HYPOTENSION TYPE: Primary | ICD-10-CM

## 2018-09-25 DIAGNOSIS — I25.10 CORONARY ARTERY DISEASE INVOLVING NATIVE CORONARY ARTERY OF NATIVE HEART WITHOUT ANGINA PECTORIS: ICD-10-CM

## 2018-09-25 DIAGNOSIS — E53.8 B12 DEFICIENCY: ICD-10-CM

## 2018-09-25 DIAGNOSIS — Z23 ENCOUNTER FOR IMMUNIZATION: ICD-10-CM

## 2018-09-25 NOTE — MR AVS SNAPSHOT
Shelley Oliveiraa [address] [facility] [place] [id number] 
[phone] Patient: Vlad Bustos MRN: [id number] Visit Information Date & Time Provider Department Dept. Phone Encounter #  
 9/25/2018  9:30 AM Basilia Muñiz [facility] [phone] [id number] Follow-up Instructions Return in about 2 days (around 9/27/2018) for blood pressure check and f/u in 2 months- 30 minute slot. Your Appointments 9/25/2018  9:30 AM  
Follow Up with MD Olena Vazquez[address]) Appt Note: follow-up 30 min; r/s'ed from 09/17 at the request of the pt's daughter, Jamshid Henry [place]. [address]  
  
   
 [address] [place]  
  
    
 2/22/2019 11:15 AM  
Office Visit with Basilia Muñiz MD  
[address]) Appt Note: [address] [place] [address] [address] [place]  
  
   
 [address] [place] Upcoming Health Maintenance Date Due DTaP/Tdap/Td series (1 - Tdap) 11/14/1951 Shingrix Vaccine Age 50> (1 of 2) 11/14/1980 Influenza Age 5 to Adult 8/1/2018 MEDICARE YEARLY EXAM 2/23/2019 GLAUCOMA SCREENING Q2Y 7/13/2019 Allergies as of 9/25/2018  Review Complete On: 9/25/2018 By: Basilia Muñiz MD  
  
 Severity Noted Reaction Type Reactions Ace Inhibitors  11/29/2010    Cough Codeine  11/19/2010    Other (comments)  
 headache Effexor [Venlafaxine]  06/14/2017    Other (comments)  
 confusion Fosamax [Alendronate]  11/19/2010    Other (comments) peridontal pain Procardia [Nifedipine]  11/19/2010    Swelling Current Immunizations  Reviewed on 3/19/2018 Name Date Influenza High Dose Vaccine PF 9/7/2017, 11/15/2016 Influenza Vaccine 11/5/2014, 10/8/2013 Influenza Vaccine (Quad) 9/8/2015 12:00 PM  
 Pneumococcal Conjugate (PCV-13) 11/15/2016 ZZZ-RETIRED (DO NOT USE) Pneumococcal Vaccine (Unspecified Type) 11/7/2011 Not reviewed this visit You Were Diagnosed With   
  
 Codes Comments Hypotension, unspecified hypotension type    -  Primary ICD-10-CM: I95.9 ICD-9-CM: 458.9 B12 deficiency     ICD-10-CM: E53.8 ICD-9-CM: 266.2 Hypocalcemia     ICD-10-CM: E83.51 
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  
 23.57 kg/m 2 1.76 m 2 Preferred Pharmacy Pharmacy Name Phone [facility] [address], [address] [place] [address] [phone] Your Updated Medication List  
  
   
This list is accurate as of 9/25/18  8:57 AM.  Always use your most recent med list.  
  
  
  
  
 albuterol 90 mcg/actuation inhaler Commonly known as:  PROVENTIL HFA, VENTOLIN HFA, PROAIR HFA Take 1 Puff by inhalation every four (4) hours as needed for Wheezing. aspirin delayed-release 81 mg tablet Take 1 Tab by mouth daily. atorvastatin 40 mg tablet Commonly known as:  LIPITOR  
take 1 tablet by mouth once daily  
  
 calcium-cholecalciferol (D3) tablet Commonly known as:  CALCARB 600 WITH VITAMIN D Take 1 Tab by mouth two (2) times a day. clotrimazole 1 % topical cream  
Commonly known as:  Sonu Garcia Apply  to affected area two (2) times a day. Use for 2 weeks. cyanocobalamin 1,000 mcg tablet Commonly known as:  VITAMIN B-12 Take 1 Tab by mouth daily. diphenhydrAMINE 25 mg tablet Commonly known as:  BENADRYL Take 1 Tab by mouth nightly. fluticasone 50 mcg/actuation nasal spray Commonly known as:  [place] 2 Sprays by Both Nostrils route daily. metoprolol tartrate 25 mg tablet Commonly known as:  LOPRESSOR Take 12.5 mg by mouth daily. Follow-up Instructions Return in about 2 days (around 9/27/2018) for blood pressure check and f/u in 2 months- 30 minute slot. Patient Instructions Stop the amlodipine and cozaar at this point. Introducing [facility]! [facility] introduces [employer] patient portal. Now you can access parts of your medical record, email your doctor's office, and request medication refills online. 1. In your internet browser, go to [email or web address] 2. Click on the First Time User? Click Here link in the Sign In box. You will see the New Member Sign Up page. 3. Enter your [employer] Access Code exactly as it appears below. You will not need to use this code after youve completed the sign-up process. If you do not sign up before the expiration date, you must request a new code. · [employer] Access Code: [email or web address] Expires: 9/26/2018  8:28 AM 
 
4. Enter the last four digits of your Social Security Number (xxxx) and Date of Birth (mm/dd/yyyy) as indicated and click Submit. You will be taken to the next sign-up page. 5. Create a [employer] ID. This will be your [employer] login ID and cannot be changed, so think of one that is secure and easy to remember. 6. Create a [employer] password. You can change your password at any time. 7. Enter your Password Reset Question and Answer. This can be used at a later time if you forget your password. 8. Enter your e-mail address. You will receive e-mail notification when new information is available in [address]. 9. Click Sign Up. You can now view and download portions of your medical record. 10. Click the Download Summary menu link to download a portable copy of your medical information. If you have questions, please visit the Frequently Asked Questions section of the [employer] website. Remember, [employer] is NOT to be used for urgent needs. For medical emergencies, dial 911. Now available from your iPhone and Android! Please provide this summary of care documentation to your next provider. Your primary care clinician is listed as PAUL Medina. If you have any questions after today's visit, please call [phone].

## 2018-09-25 NOTE — PROGRESS NOTES
Tree Vela is a 80 y.o. male and presents with Follow Up Chronic Condition; Cholesterol Problem; Hypertension; Hypocalcemia; Other (Mitral Regurgitation); and Results (labs) Subjective: 
 
htn-diastolic bp low- no cp or sob. Chronic fatigue unchanged. No lightheadedness or dizziness. Took meds this am on an empty stomach. Hypocalcemia- resolved on recheck. No muscle cramping. b12 deficiency- taking supplement daily. CAD- no cp or sob. Assessment/Plan:   
 
Diagnoses and all orders for this visit: 
 
1. Hypotension, unspecified hypotension type- will monitor closely. htn- hypotensive today. stopping amlodipine and cozaar entirely today- will need recheck blood pressure later this week. Pt asked to call for any new symptoms. 2. B12 deficiency- continue supplement. levels are normal currently. No changes, continue supplement. 3. Hypocalcemia- resolved and normal ionized Ca and normal vit D.  
 
4. Coronary artery disease involving native coronary artery of native heart without angina pectoris/aortic regurg- stable symptomatically but low DBP- has cardiology f/u today. RTC in bp check later this week and f/u in 2 months. ROS: 
Negative except as mentioned above Cardiac- no chest pain or palpitations Pulmonary- no sob or wheezes GI- no n/v or diarrhea. SH: Social History Substance Use Topics  Smoking status: Former Smoker Years: 8.00 Quit date: 11/7/1953  Smokeless tobacco: Never Used  Alcohol use No  
 
 
 
Medications/Allergies: 
Current Outpatient Prescriptions on File Prior to Visit Medication Sig Dispense Refill  atorvastatin (LIPITOR) 40 mg tablet take 1 tablet by mouth once daily 90 Tab 3  
 losartan (COZAAR) 25 mg tablet Take 25 mg by mouth daily.  fluticasone (FLONASE) 50 mcg/actuation nasal spray 2 Sprays by Both Nostrils route daily. 1 Bottle 6  
 diphenhydrAMINE (BENADRYL) 25 mg tablet Take 1 Tab by mouth nightly.  30 Tab 3  
  amLODIPine (NORVASC) 5 mg tablet Take 5 mg by mouth daily.  clotrimazole (LOTRIMIN) 1 % topical cream Apply  to affected area two (2) times a day. Use for 2 weeks. 30 g 1  
 metoprolol tartrate (LOPRESSOR) 25 mg tablet Take 12.5 mg by mouth daily.  aspirin delayed-release 81 mg tablet Take 1 Tab by mouth daily. 90 Tab 3  
 albuterol (PROVENTIL HFA, VENTOLIN HFA, PROAIR HFA) 90 mcg/actuation inhaler Take 1 Puff by inhalation every four (4) hours as needed for Wheezing. 1 Inhaler 1  cyanocobalamin (VITAMIN B-12) 1,000 mcg tablet Take 1 Tab by mouth daily. 90 Tab 3  
 calcium-cholecalciferol, D3, (CALCARB 600 WITH VITAMIN D) tablet Take 1 Tab by mouth two (2) times a day. 180 Tab 3 No current facility-administered medications on file prior to visit. Allergies Allergen Reactions  Ace Inhibitors Cough  Codeine Other (comments)  
  headache  Effexor [Venlafaxine] Other (comments)  
  confusion  Fosamax [Alendronate] Other (comments) peridontal pain  Procardia [Nifedipine] Swelling Objective: 
Visit Vitals  BP (!) 127/32  Pulse 64  Temp 96.1 °F (35.6 °C) (Oral)  Resp 16  
 Ht 5' 6\" (1.676 m)  Wt 146 lb (66.2 kg)  BMI 23.57 kg/m2 Body mass index is 23.57 kg/(m^2). Constitutional: Well developed, nourished, no distress, alert CV: S1, S2.  RRR. Systolic and diastolic murmurs. Left LE 1+ chronic edema. Pulm: No abnormalities on inspection. Clear to auscultation bilaterally. No wheezing/rhonchi. Normal effort. GI: Soft, nontender, nondistended. Normal active bowel sounds.

## 2018-09-25 NOTE — PROGRESS NOTES
1. Have you been to the ER, urgent care clinic since your last visit? Hospitalized since your last visit? No.  
 
2. Have you seen or consulted any other health care providers outside of the [address] [place] [address] since your last visit? Include any pap smears or colon screening. No.  
 
Chief Complaint Patient presents with  Follow Up Chronic Condition  Cholesterol Problem  Hypertension  Hypocalcemia  Other Mitral Regurgitation  Results  
  labs

## 2018-09-27 ENCOUNTER — CLINICAL SUPPORT (OUTPATIENT)
Dept: FAMILY MEDICINE CLINIC | Age: 83
End: 2018-09-27

## 2018-09-27 VITALS — SYSTOLIC BLOOD PRESSURE: 151 MMHG | HEART RATE: 65 BPM | DIASTOLIC BLOOD PRESSURE: 37 MMHG

## 2018-09-27 DIAGNOSIS — I95.9 HYPOTENSION, UNSPECIFIED HYPOTENSION TYPE: Primary | ICD-10-CM

## 2018-09-27 RX ORDER — LOSARTAN POTASSIUM 25 MG/1
TABLET ORAL DAILY
COMMUNITY
End: 2018-10-04 | Stop reason: ALTCHOICE

## 2018-09-27 NOTE — PROGRESS NOTES
Chief Complaint   Patient presents with    Blood Pressure Check     Patient stated he did not stop taking his cozaar because he could not find the bottle with all his medication but when I looked his pill bottle he has the generic form of cozaar which is Losartan 25mg. Per Dr. Shahla Goff, stop taking the Losartan 25mg and come back in 1 week for repeat blood pressure.

## 2018-10-04 ENCOUNTER — OFFICE VISIT (OUTPATIENT)
Dept: FAMILY MEDICINE CLINIC | Age: 83
End: 2018-10-04

## 2018-10-04 VITALS — HEART RATE: 67 BPM | DIASTOLIC BLOOD PRESSURE: 41 MMHG | SYSTOLIC BLOOD PRESSURE: 148 MMHG

## 2018-10-04 DIAGNOSIS — I10 ESSENTIAL HYPERTENSION: Primary | ICD-10-CM

## 2018-10-04 NOTE — PROGRESS NOTES
Chief Complaint   Patient presents with    Blood Pressure Check     Per Dr. Pollo Guadalupe, continue current medication and recheck blood pressure again in 1 month.

## 2018-11-05 ENCOUNTER — OFFICE VISIT (OUTPATIENT)
Dept: FAMILY MEDICINE CLINIC | Age: 83
End: 2018-11-05

## 2018-11-05 VITALS
WEIGHT: 139.4 LBS | BODY MASS INDEX: 22.4 KG/M2 | HEIGHT: 66 IN | RESPIRATION RATE: 16 BRPM | HEART RATE: 76 BPM | DIASTOLIC BLOOD PRESSURE: 42 MMHG | TEMPERATURE: 96.3 F | SYSTOLIC BLOOD PRESSURE: 145 MMHG

## 2018-11-05 DIAGNOSIS — M80.00XD AGE-RELATED OSTEOPOROSIS WITH CURRENT PATHOLOGICAL FRACTURE WITH ROUTINE HEALING, SUBSEQUENT ENCOUNTER: Primary | ICD-10-CM

## 2018-11-05 DIAGNOSIS — I95.9 HYPOTENSION, UNSPECIFIED HYPOTENSION TYPE: ICD-10-CM

## 2018-11-05 PROBLEM — I95.0 IDIOPATHIC HYPOTENSION: Status: ACTIVE | Noted: 2018-11-05

## 2018-11-05 NOTE — PROGRESS NOTES
1. Have you been to the ER, urgent care clinic since your last visit? Hospitalized since your last visit? No.  
 
2. Have you seen or consulted any other health care providers outside of the [address] [place] [address] since your last visit? Include any pap smears or colon screening. No.  
 
Chief Complaint Patient presents with  Follow Up Chronic Condition  Hypotension

## 2018-11-05 NOTE — PROGRESS NOTES
Augustine Phillips is a 80 y.o. male and presents with Follow Up Chronic Condition and Hypotension Subjective: Hypotension- doing well. No cp or sob. No new sx. No lightheadedness or sob. Hm- unable to get shingles vaccine due to availability. Osteoporosis/compression fx. - no new sx. Some chronic mild back pain. Assessment/Plan: Hypotension- stable. No sx. Follows with cardiology. Pt will call for any new sx. HM- pt asked to call pharmacy to check on shingrix availability. Osteoporosis and compression fx- pt does not want tx again for this. Discussed potential issues with this including additional fx and pain. Continue vit D and Ca at least stressed- last levels normal.  
 
Diagnoses and all orders for this visit: 1. Age-related osteoporosis with current pathological fracture with routine healing, subsequent encounter 2. Hypotension, unspecified hypotension type RTC in 3-4 months. ROS: 
Negative except as mentioned above Cardiac- no chest pain or palpitations Pulmonary- no sob or wheezes GI- no n/v or diarrhea. SH: Social History Tobacco Use  Smoking status: Former Smoker Years: 8.00 Last attempt to quit: 1953 Years since quittin.0  Smokeless tobacco: Never Used Substance Use Topics  Alcohol use: No  
 Drug use: No  
 
 
 
Medications/Allergies: 
Current Outpatient Medications on File Prior to Visit Medication Sig Dispense Refill  atorvastatin (LIPITOR) 40 mg tablet take 1 tablet by mouth once daily 90 Tab 3  
 fluticasone (FLONASE) 50 mcg/actuation nasal spray 2 Sprays by Both Nostrils route daily. 1 Bottle 6  
 metoprolol tartrate (LOPRESSOR) 25 mg tablet Take 12.5 mg by mouth daily.  aspirin delayed-release 81 mg tablet Take 1 Tab by mouth daily.  90 Tab 3  
 albuterol (PROVENTIL HFA, VENTOLIN HFA, PROAIR HFA) 90 mcg/actuation inhaler Take 1 Puff by inhalation every four (4) hours as needed for Wheezing. 1 Inhaler 1  cyanocobalamin (VITAMIN B-12) 1,000 mcg tablet Take 1 Tab by mouth daily. 90 Tab 3  
 calcium-cholecalciferol, D3, (CALCARB 600 WITH VITAMIN D) tablet Take 1 Tab by mouth two (2) times a day. 180 Tab 3  
 diphenhydrAMINE (BENADRYL) 25 mg tablet Take 1 Tab by mouth nightly. 30 Tab 3  clotrimazole (LOTRIMIN) 1 % topical cream Apply  to affected area two (2) times a day. Use for 2 weeks. 30 g 1 No current facility-administered medications on file prior to visit. Allergies Allergen Reactions  Ace Inhibitors Cough  Codeine Other (comments)  
  headache  Effexor [Venlafaxine] Other (comments)  
  confusion  Fosamax [Alendronate] Other (comments) peridontal pain  Procardia [Nifedipine] Swelling Objective: 
Visit Vitals /42 Pulse 76 Temp 96.3 °F (35.7 °C) (Oral) Resp 16 Ht 5' 6\" (1.676 m) Wt 139 lb 6.4 oz (63.2 kg) BMI 22.50 kg/m² Body mass index is 22.5 kg/m². Constitutional: Well developed, nourished, no distress, alert, kyphotic CV: S1, S2.  RRR. Dustin Counter No edema. Pulm: No abnormalities on inspection. Clear to auscultation bilaterally. No wheezing/rhonchi. Normal effort. GI: Soft, nontender, nondistended. Normal active bowel sounds. No  masses on palpation. No hepatosplenomegaly.

## 2018-11-05 NOTE — PATIENT INSTRUCTIONS
Learning About Frailty What is frailty? Older adults may tire quickly and move more slowly as they age. Everyday activities, like shopping or even getting dressed, can become hard to do. This may be a health problem called frailty. Experts think frailty develops because of changes in how your body works. These changes can be caused by aging, a disease, or both. Your organs may not do their jobs as well, and you may lose muscle. Frailty also involves: 
· Changes in your body's endocrine system, which releases the hormones, or chemicals, that affect how your body functions. · Inflammation, or damage to the cells and tissues in your body. What are the symptoms? You may be frail if you have lost weight, are weak, or feel like you have low energy. The way you feel when you're frail may make you more likely to have depression. What happens when you become frail? When you are frail, you may have trouble doing everyday tasks, like getting dressed, eating, bathing, getting in or out of bed, and using the toilet. You may feel weak and off balance and worry about falling. If you also have another health problem, your frailty may get worse quickly. How can you care for yourself? If you think you are becoming frail, see your doctor. There are things you and your doctor can do to prevent frailty or slow it down. If frailty is caused or made worse by another health problem, you and your doctor can treat the problem. Talk to your doctor about any medicines you're taking that might be making you feel tired. Many medicines, such as cold and allergy medicines, often cause fatigue. Eat healthy Food gives you calories, which provide energy and can help stop weight loss. Here are some tips for eating well: 
· Eat more. Getting calories may be more important than avoiding fat for other reasons. But talk to your doctor about any changes you'd like to make in what you eat. · Eat more protein. This may help you keep your muscles strong. · Try high-energy drinks, such as Boost, Ensure, or instant breakfast drinks. Smoothies, milk shakes, and milk also may help you limit weight loss. · If it's easier for you, eat smaller meals several times a day, rather than larger meals 3 times a day. Do not skip meals, especially breakfast. 
Stay active Talk to your doctor about exercises to help build your strength and balance. Examples include: · Resistance exercises, such as pushing against a wall or sitting in a chair and raising your legs. These are an easy way to tone your muscles. · Walking, which can be good for your body. But ask your doctor how often and how much walking is best for you. · Freddy chi, which is moving in a slow, rhythmic way. This can help with muscle tone and balance. Prevent falls If you are worried about falling, here are some things you can do: · Keep your bones strong. Talk to your doctor to be sure you are getting enough vitamin D and calcium. · Take out raised doorway thresholds, and get rid of throw rugs. · If stairs are a problem, put in handrails or a ramp. · Arrange your furniture and electric cords to keep them out of walking paths. · Keep your house well lit, especially [place], and outside walkways. Use night-lights in hallways and bathrooms. · Use shower chairs and bath benches. Stay connected When you feel tired, it's often easier to stay home and not see people. But it is important to connect with others and stay positive. Being with other people can help you feel good and may help you stay healthier as you age. Where can you learn more? Go to http://gwendolyn-gay.info/. Enter E321 in the search box to learn more about \"Learning About Frailty. \" Current as of: March 16, 2018 Content Version: 11.8 © 9429-5667 [employer].  Care instructions adapted under license by [address] (which disclaims liability or warranty for this information). If you have questions about a medical condition or this instruction, always ask your healthcare professional. [address] any warranty or liability for your use of this information.

## 2018-11-13 DIAGNOSIS — R63.4 WEIGHT LOSS: Primary | ICD-10-CM

## 2018-11-21 ENCOUNTER — DOCUMENTATION ONLY (OUTPATIENT)
Dept: FAMILY MEDICINE CLINIC | Age: 83
End: 2018-11-21

## 2018-11-21 NOTE — PROGRESS NOTES
I called but phone keeps ringing, Dr. Shayy Dominguez wants to know why he is coming too soon for his follow up appointment. Hw was last seen in 11/5/2018 and not due to see him back in 3-6 months. If he is having some new problems we will see him this Friday 11/23/2018.

## 2018-11-23 ENCOUNTER — OFFICE VISIT (OUTPATIENT)
Dept: FAMILY MEDICINE CLINIC | Age: 83
End: 2018-11-23

## 2018-11-23 VITALS
DIASTOLIC BLOOD PRESSURE: 44 MMHG | TEMPERATURE: 95.9 F | RESPIRATION RATE: 16 BRPM | HEART RATE: 66 BPM | HEIGHT: 66 IN | WEIGHT: 138 LBS | BODY MASS INDEX: 22.18 KG/M2 | SYSTOLIC BLOOD PRESSURE: 137 MMHG

## 2018-11-23 DIAGNOSIS — E53.8 B12 DEFICIENCY: ICD-10-CM

## 2018-11-23 DIAGNOSIS — R43.2 DYSGEUSIA: Primary | ICD-10-CM

## 2018-11-23 DIAGNOSIS — R11.0 NAUSEA: ICD-10-CM

## 2018-11-23 DIAGNOSIS — D64.9 ANEMIA, UNSPECIFIED TYPE: ICD-10-CM

## 2018-11-23 DIAGNOSIS — R63.4 WEIGHT LOSS: ICD-10-CM

## 2018-11-23 RX ORDER — SERTRALINE HYDROCHLORIDE 25 MG/1
25 TABLET, FILM COATED ORAL DAILY
Qty: 90 TAB | Refills: 3 | Status: SHIPPED | OUTPATIENT
Start: 2018-11-23

## 2018-11-23 NOTE — PROGRESS NOTES
1. Have you been to the ER, urgent care clinic since your last visit? Hospitalized since your last visit? No.     2. Have you seen or consulted any other health care providers outside of the [address] [place] [address] since your last visit? Include any pap smears or colon screening. No.     Chief Complaint   Patient presents with    Decreased Appetite    Other     loss taste and smell.     Chest Pain     when walking up and down the stairs

## 2018-11-23 NOTE — PATIENT INSTRUCTIONS
Abnormal Weight Loss: Care Instructions  Your Care Instructions    There are two types of weight loss--normal and abnormal. The normal kind happens when you are trying to lose weight by exercising more or eating less. The abnormal kind happens when you are not trying to lose weight. Many medical problems can cause abnormal weight loss. These include problems with your thyroid gland, long-term infections, mouth or throat problems that make it hard to eat, and digestive problems. They also include depression and cancer. Some medicines also may cause you to lose weight. You can work with your doctor to find the cause of your weight loss. You will probably need tests to do this. Follow-up care is a key part of your treatment and safety. Be sure to make and go to all appointments, and call your doctor if you are having problems. It's also a good idea to know your test results and keep a list of the medicines you take. How can you care for yourself at home? · Weigh yourself at the same time every day. It's best to do it first thing in the morning after you empty your bladder. Be sure to always wear the same amount of clothing. · Write down any changes in your weight and the possible causes. Discuss these with your doctor. · Your doctor may want you to change your diet. Do your best to follow his or her advice. · Ask your doctor if you should see a dietitian. This is a person who can help you plan meals that work best for your lifestyle. · Note any changes in bowel habits. These may include changes in how often you have a bowel movement. Other changes include the color and size of your stools and how solid they are. · If you are prescribed medicines, take them exactly as prescribed. Call your doctor if you think you are having a problem with your medicine. You will get more details on the specific medicines your doctor prescribes. When should you call for help?   Watch closely for changes in your health, and be sure to contact your doctor if:    · You do not get better as expected.     · You continue to lose weight. Where can you learn more? Go to http://gwendolyn-gay.info/. Enter A790 in the search box to learn more about \"Abnormal Weight Loss: Care Instructions. \"  Current as of: June 26, 2018  Content Version: 11.8  © 6892-2312 [employer]. Care instructions adapted under license by [employer] (which disclaims liability or warranty for this information). If you have questions about a medical condition or this instruction, always ask your healthcare professional. [place] [address] any warranty or liability for your use of this information.

## 2018-11-23 NOTE — PROGRESS NOTES
Hilary Moya is a 80 y.o. male and presents with Decreased Appetite; Other (loss taste and smell.)       Subjective:  Patient here with daughter. He continues to struggle with appetite and weight loss. He went to live with his daughter for 2 weeks recently and they tried to get him to increase his intake but the patient states he simply does not have much of an appetite. No early satiety some occasional nausea but no emesis no stool changes. No abdominal pain. No food fear. He he states that he simply does not find himself hungry and has not been able to force himself to increase his intake. He is not taking his nutritional supplements regularly either. He has noticed a lack of smell. No other neurologic symptoms such as weakness vision changes or lack of sensation. They were originally going to find a geriatrician in [place]  But as the patient wanted to move back to this area they would like a referral nearby    Assessment/Plan:    Diagnoses and all orders for this visit:    1. Dysgeusia-   -     ZINC, WHOLE BLOOD; Future    2. Weight loss-discussed caloric intake in detail and importance of taking 2-3 cans of nutritional supplement per day in addition to normal eating. I suspect this is multifactorial with depression being a significant component. We had started Effexor in the past but this possibly caused some confusion although the timing was not clear for this. Will try Zoloft very low dose this time and the family will monitor him when he starts this. Asked to increase the Zoloft dose after 2 weeks if he is doing well. Given the episodes of nausea will also refer to gastroenterology after discussion with family and patient.   -     METABOLIC PANEL, BASIC; Future  -     TSH 3RD GENERATION; Future    3. B12 deficiency-in the past he believes. He is taking a supplement. We will recheck this level. -     VITAMIN B12; Future    4. Anemia, unspecified type-mild.   Will recheck with iron levels  -     CBC W/O DIFF; Future  -     FERRITIN; Future  -     IRON PROFILE; Future    Other orders  -     sertraline (ZOLOFT) 25 mg tablet; Take 1 Tab by mouth daily. -     nutritional supplements liqd; Take 1 Can by mouth three (3) times daily. RTC in 1 month referral to gastroenterology  Orders Placed This Encounter    ZINC, WHOLE BLOOD     Standing Status:   Future     Standing Expiration Date:       METABOLIC PANEL, BASIC     Standing Status:   Future     Standing Expiration Date:   2019    TSH 3RD GENERATION     Standing Status:   Future     Standing Expiration Date:   2019    VITAMIN B12     Standing Status:   Future     Standing Expiration Date:   2019    CBC W/O DIFF     Standing Status:   Future     Standing Expiration Date:   2019    FERRITIN     Standing Status:   Future     Standing Expiration Date:   2019    IRON PROFILE     Standing Status:   Future     Standing Expiration Date:   2019    sertraline (ZOLOFT) 25 mg tablet     Sig: Take 1 Tab by mouth daily. Dispense:  90 Tab     Refill:  3    nutritional supplements liqd     Sig: Take 1 Can by mouth three (3) times daily. Dispense:  270 Can     Refill:  3           ROS:  Negative except as mentioned above  Cardiac- no chest pain today or palpitations  Pulmonary- no sob or wheezes  GI- no n/v or diarrhea. SH:  Social History     Tobacco Use    Smoking status: Former Smoker     Years: 8.00     Last attempt to quit: 1953     Years since quittin.0    Smokeless tobacco: Never Used   Substance Use Topics    Alcohol use: No    Drug use: No         Medications/Allergies:  Current Outpatient Medications on File Prior to Visit   Medication Sig Dispense Refill    atorvastatin (LIPITOR) 40 mg tablet take 1 tablet by mouth once daily 90 Tab 3    fluticasone (FLONASE) 50 mcg/actuation nasal spray 2 Sprays by Both Nostrils route daily.  1 Bottle 6    metoprolol tartrate (LOPRESSOR) 25 mg tablet Take 12.5 mg by mouth daily.  aspirin delayed-release 81 mg tablet Take 1 Tab by mouth daily. 90 Tab 3    albuterol (PROVENTIL HFA, VENTOLIN HFA, PROAIR HFA) 90 mcg/actuation inhaler Take 1 Puff by inhalation every four (4) hours as needed for Wheezing. 1 Inhaler 1    cyanocobalamin (VITAMIN B-12) 1,000 mcg tablet Take 1 Tab by mouth daily. 90 Tab 3    calcium-cholecalciferol, D3, (CALCARB 600 WITH VITAMIN D) tablet Take 1 Tab by mouth two (2) times a day. 180 Tab 3    diphenhydrAMINE (BENADRYL) 25 mg tablet Take 1 Tab by mouth nightly. 30 Tab 3    clotrimazole (LOTRIMIN) 1 % topical cream Apply  to affected area two (2) times a day. Use for 2 weeks. 30 g 1     No current facility-administered medications on file prior to visit. Allergies   Allergen Reactions    Ace Inhibitors Cough    Codeine Other (comments)     headache    Effexor [Venlafaxine] Other (comments)     confusion    Fosamax [Alendronate] Other (comments)     peridontal pain    Procardia [Nifedipine] Swelling       Objective:  Visit Vitals  /44   Pulse 66   Temp 95.9 °F (35.5 °C) (Oral)   Resp 16   Ht 5' 6\" (1.676 m)   Wt 138 lb (62.6 kg)   BMI 22.27 kg/m²    Body mass index is 22.27 kg/m². Constitutional: Well developed, nourished, no distress, alert   CV: S1, S2.  RRR.  3/6 murmur. No thrills palpated. No carotid bruits. Intact distal pulses. No edema. Pulm: No abnormalities on inspection. Clear to auscultation bilaterally. No wheezing/rhonchi. Normal effort. GI: Soft, nontender, nondistended. Normal active bowel sounds. No  masses on palpation. No hepatosplenomegaly.

## 2024-05-20 NOTE — MR AVS SNAPSHOT
Shelley [facility]a [place] [address] [facility] [place] [address] [id number] 
[phone] Patient: Arelis Ramirez MRN: [id number] ECO:[id number] Visit Information Date & Time Provider Department Dept. Phone Encounter #  
 5/22/2018 11:45 AM Anahi Clemons, [address] [place], [place] [id number] Follow-up Instructions Return in about 4 weeks (around 6/19/2018) for 30 minute slot. Your Appointments 6/7/2018  9:30 AM  
Follow Up with MD Suzi Roberts 23 ([address]) Appt Note: 4 mo f/u  
 [facility] [place] [address]  
  
   
 [address]  
  
    
 2/22/2019 11:15 AM  
Office Visit with MD Suzi Roberts 23 [address] [facility]) Appt Note: 295 [facility] 68 [facility] [place] [address]  
  
   
 [address] Upcoming Health Maintenance Date Due DTaP/Tdap/Td series (1 - Tdap) 11/14/1951 Influenza Age 5 to Adult 8/1/2018 MEDICARE YEARLY EXAM 2/23/2019 GLAUCOMA SCREENING Q2Y 7/13/2019 Allergies as of 5/22/2018  Review Complete On: 5/22/2018 By: Anahi Clemons MD  
  
 Severity Noted Reaction Type Reactions Ace Inhibitors  11/29/2010    Cough Codeine  11/19/2010    Other (comments)  
 headache Effexor [Venlafaxine]  06/14/2017    Other (comments)  
 confusion Fosamax [Alendronate]  11/19/2010    Other (comments) peridontal pain Procardia [Nifedipine]  11/19/2010    Swelling Current Immunizations  Reviewed on 3/19/2018 Name Date Influenza High Dose Vaccine PF 9/7/2017, 11/15/2016 Influenza Vaccine 11/5/2014, 10/8/2013 Influenza Vaccine (Quad) 9/8/2015 12:00 PM  
 Pneumococcal Conjugate (PCV-13) 11/15/2016 ZZZ-RETIRED (DO NOT USE) Pneumococcal Vaccine (Unspecified Type) 11/7/2011 Not reviewed this visit You Were Diagnosed With   
  
 Codes Comments Mixed hyperlipidemia    -  Primary ICD-10-CM: S49.5 ICD-9-CM: 272.2 Aortic valve insufficiency, etiology of cardiac valve disease unspecified     ICD-10-CM: I35.1 ICD-9-CM: 424.1 seeing Dr. Nely Pemberton and Temitope Narvaez Essential hypertension     ICD-10-CM: I10 
ICD-9-CM: 401.9 Vitals Resp Height(growth percentile) Weight(growth percentile) BMI Smoking Status 16 5' 6\" (1.676 m) 149 lb (67.6 kg) 24.05 kg/m2 Former Smoker BMI and BSA Data Body Mass Index Body Surface Area 24.05 kg/m 2 1.77 m 2 Preferred Pharmacy Pharmacy Name Phone [facility] [phone], 130 [facility] [phone] [place] [address] [phone] Your Updated Medication List  
  
   
This list is accurate as of 5/22/18 12:25 PM.  Always use your most recent med list.  
  
  
  
  
 albuterol 90 mcg/actuation inhaler Commonly known as:  PROVENTIL HFA, VENTOLIN HFA, PROAIR HFA Take 1 Puff by inhalation every four (4) hours as needed for Wheezing. amLODIPine 5 mg tablet Commonly known as:  Leward Sanders Take 5 mg by mouth daily. aspirin delayed-release 81 mg tablet Take 1 Tab by mouth daily. atorvastatin 40 mg tablet Commonly known as:  LIPITOR  
take 1 tablet by mouth once daily  
  
 calcium-cholecalciferol (D3) tablet Commonly known as:  CALCARB 600 WITH VITAMIN D Take 1 Tab by mouth two (2) times a day. clotrimazole 1 % topical cream  
Commonly known as:  Terra Di Giorgio Apply  to affected area two (2) times a day. Use for 2 weeks. cyanocobalamin 1,000 mcg tablet Commonly known as:  VITAMIN B-12 Take 1 Tab by mouth daily. diphenhydrAMINE 25 mg tablet Commonly known as:  BENADRYL Take 1 Tab by mouth nightly. fluticasone 50 mcg/actuation nasal spray Commonly known as:  Robin Ad 2 Sprays by Both Nostrils route daily. losartan 25 mg tablet Commonly known as:  COZAAR Take 25 mg by mouth daily. metoprolol tartrate 25 mg tablet Commonly known as:  LOPRESSOR Take 12.5 mg by mouth daily. Prescriptions Printed Refills  
 atorvastatin (LIPITOR) 40 mg tablet 3 Sig: take 1 tablet by mouth once daily Class: Print Follow-up Instructions Return in about 4 weeks (around 6/19/2018) for 30 minute slot. Patient Instructions Aortic Valve Regurgitation: Care Instructions Your Care Instructions The aortic valve works like a one-way gate. It opens so that blood can leave the heart and flow to the rest of the body. When the heart rests between beats, the aortic valve closes to keep blood from flowing backward into the heart. When the aortic valve does not close properly, some of the blood leaks back (regurgitates) through the valve into the heart. Then your heart has to work harder to pump blood throughout your body. You can have this condition for many years before it gets worse and you have symptoms. Your doctor will monitor your heart, and you may need to take medicines. If the disease becomes severe, you may need to have surgery to replace the valve. Follow-up care is a key part of your treatment and safety. Be sure to make and go to all appointments, and call your doctor if you are having problems. It's also a good idea to know your test results and keep a list of the medicines you take. How can you care for yourself at home? · Be safe with medicines. Take your medicines exactly as prescribed. Call your doctor if you think you are having a problem with your medicine. You will get more details on the specific medicines your doctor prescribes. · If you have an artificial valve, you may need to take antibiotics before you have certain dental or surgical procedures. The antibiotics help prevent an infection in your heart called endocarditis. · Eat heart-healthy foods such as fruits, vegetables, whole grains, fish, lean meats, and low-fat or nonfat dairy foods. Limit sodium, sugar, and alcohol. · Be active. Ask your doctor what type and level of exercise is safe for you. Walking is a good choice. You also may want to swim, bike, or do other activities. Talk with your doctor before doing strenuous activities or weightlifting. · Do not smoke. Smoking can make heart problems worse. If you need help quitting, talk to your doctor about stop-smoking programs and medicines. These can increase your chances of quitting for good. · Stay at a healthy weight. Lose weight if you need to. · Avoid colds and flu. Get a pneumococcal vaccine shot. If you have had one before, ask your doctor whether you need another dose. Get a flu vaccine every year. If you must be around people with colds or flu, wash your hands often. · Take care of your teeth and gums. Get regular dental checkups. Good dental health is important because bacteria can spread from infected teeth and gums to the heart valves. When should you call for help? Call 911 anytime you think you may need emergency care. For example, call if: 
? · You have signs of acute aortic valve regurgitation such as: ¨ Severe shortness of breath. ¨ A rapid heart rate. ¨ Lightheadedness. ? · You have symptoms of sudden heart failure such as: ¨ Severe trouble breathing. ¨ Coughing up pink, foamy mucus. ¨ A new irregular or rapid heartbeat. ?Call your doctor now or seek immediate medical care if: 
? · You have new or increased shortness of breath. ? · You are dizzy or lightheaded, or you feel like you may faint. ? · You have sudden weight gain, such as more than 2 to 3 pounds in a day or 5 pounds in a week. (Your doctor may suggest a different range of weight gain.) ? · You have increased swelling in your legs, ankles, or feet.   
? · You are suddenly so tired or weak that you cannot do your usual activities. ? Watch closely for changes in your health, and be sure to contact your doctor if you have any problems. Where can you learn more? Go to http://gwendolyn-gay.info/. Enter P867 in the search box to learn more about \"Aortic Valve Regurgitation: Care Instructions. \" Current as of: September 21, 2016 Content Version: 11.4 © 9518-6005 [employer]. Care instructions adapted under license by [employer] (which disclaims liability or warranty for this information). If you have questions about a medical condition or this instruction, always ask your healthcare professional. [address] any warranty or liability for your use of this information. Introducing [facility]! [facility] introduces CrossLoop patient portal. Now you can access parts of your medical record, email your doctor's office, and request medication refills online. 1. In your internet browser, go to [email or web address] 2. Click on the First Time User? Click Here link in the Sign In box. You will see the New Member Sign Up page. 3. Enter your CrossLoop Access Code exactly as it appears below. You will not need to use this code after youve completed the sign-up process. If you do not sign up before the expiration date, you must request a new code. · CrossLoop Access Code: [email or web address] Expires: 6/17/2018  9:51 AM 
 
4. Enter the last four digits of your Social Security Number (xxxx) and Date of Birth (mm/dd/yyyy) as indicated and click Submit. You will be taken to the next sign-up page. 5. Create a CrossLoop ID. This will be your CrossLoop login ID and cannot be changed, so think of one that is secure and easy to remember. 6. Create a CrossLoop password. You can change your password at any time. 7. Enter your Password Reset Question and Answer. This can be used at a later time if you forget your password. 8. Enter your e-mail address. You will receive e-mail notification when new information is available in [address] 9. Click Sign Up. You can now view and download portions of your medical record. 10. Click the Download Summary menu link to download a portable copy of your medical information. If you have questions, please visit the Frequently Asked Questions section of the [employer] website. Remember, [employer] is NOT to be used for urgent needs. For medical emergencies, dial 911. Now available from your iPhone and Android! Please provide this summary of care documentation to your next provider. Your primary care clinician is listed as PAUL Medina. If you have any questions after today's visit, please call [phone]. Attending Attestation (For Attendings USE Only)...